# Patient Record
Sex: FEMALE | Race: WHITE | NOT HISPANIC OR LATINO | Employment: OTHER | URBAN - METROPOLITAN AREA
[De-identification: names, ages, dates, MRNs, and addresses within clinical notes are randomized per-mention and may not be internally consistent; named-entity substitution may affect disease eponyms.]

---

## 2017-06-12 ENCOUNTER — APPOINTMENT (OUTPATIENT)
Dept: RADIOLOGY | Facility: CLINIC | Age: 82
End: 2017-06-12
Attending: EMERGENCY MEDICINE
Payer: COMMERCIAL

## 2017-06-12 ENCOUNTER — OFFICE VISIT (OUTPATIENT)
Dept: URGENT CARE | Facility: CLINIC | Age: 82
End: 2017-06-12
Payer: COMMERCIAL

## 2017-06-12 DIAGNOSIS — R05.9 COUGH: ICD-10-CM

## 2017-06-12 PROCEDURE — 71020 HB CHEST X-RAY 2VW FRONTAL&LATL: CPT

## 2017-06-12 PROCEDURE — G0463 HOSPITAL OUTPT CLINIC VISIT: HCPCS

## 2017-06-12 PROCEDURE — 99213 OFFICE O/P EST LOW 20 MIN: CPT

## 2017-06-12 PROCEDURE — 87430 STREP A AG IA: CPT

## 2017-07-17 ENCOUNTER — APPOINTMENT (OUTPATIENT)
Dept: RADIOLOGY | Facility: CLINIC | Age: 82
End: 2017-07-17
Attending: EMERGENCY MEDICINE
Payer: COMMERCIAL

## 2017-07-17 ENCOUNTER — OFFICE VISIT (OUTPATIENT)
Dept: URGENT CARE | Facility: CLINIC | Age: 82
End: 2017-07-17
Payer: COMMERCIAL

## 2017-07-17 DIAGNOSIS — R07.81 PLEURODYNIA: ICD-10-CM

## 2017-07-17 PROCEDURE — 99213 OFFICE O/P EST LOW 20 MIN: CPT

## 2017-07-17 PROCEDURE — 71101 X-RAY EXAM UNILAT RIBS/CHEST: CPT

## 2017-07-17 PROCEDURE — G0463 HOSPITAL OUTPT CLINIC VISIT: HCPCS

## 2017-11-15 ENCOUNTER — OFFICE VISIT (OUTPATIENT)
Dept: URGENT CARE | Facility: CLINIC | Age: 82
End: 2017-11-15
Payer: COMMERCIAL

## 2017-11-15 PROCEDURE — 99213 OFFICE O/P EST LOW 20 MIN: CPT

## 2017-11-16 NOTE — PROGRESS NOTES
Assessment    1  Acute bronchitis (466 0) (J20 9)    Plan  Acute bronchitis    · Azithromycin 250 MG Oral Tablet; TAKE 2 TABLETS ON DAY 1 THEN TAKE 1TABLET A DAY FOR 4 DAYS    Discussion/Summary  Discussion Summary:   Mucinex DM is recommended for cough as needed  Return immediately or go to the emergency department if any worsening chest pain trouble breathing or any other problems as we discussed  Understands and agrees with treatment plan: The treatment plan was reviewed with the patient/guardian  The patient/guardian understands and agrees with the treatment plan      Chief Complaint    1  Cold Symptoms  Chief Complaint Free Text Note Form: Pt reports on Saturday she developed a NP cough and headache  Denies fevers  She has been using her proair inhaler  History of Present Illness  HPI: For last 5 days this patient has had a moist nonproductive cough  No dyspnea  No chest pain  No fever or chills  No sore throat  Patient has chronic rhinorrhea  is alert oriented pleasant and in no distress  She does not appear toxic  Pupils equal react to light sclerae white conjunctiva pink  Nose is slightly congested  Throat is clear and moist without inflammation  Neck is supple without nodes  No jugular venous distention  No orthopnea or paroxysmal nocturnal dyspnea  Lungs are clear bilaterally with good breath sounds  No wheezing rales or rhonchi  Heart is Reg without murmurs or rubs  No S3 or S4  No pedal edema or sign of DVT  Hospital Based Practices Required Assessment:  Pain Assessment  the patient states they do not have pain  Abuse And Domestic Violence Screen   Yes, the patient is safe at home  -- The patient states no one is hurting them  Depression And Suicide Screen  No, the patient has not had thoughts of hurting themself  No, the patient has not felt depressed in the past 7 days  Prefered Language is  [place]  Primary Language is  English  Active Problems  1   Acute bronchitis (466 0) (J20 9)   2  Cough (786 2) (R05)   3  HTN (hypertension) (401 9) (I10)   4  Rib fractures (807 00) (S22 39XA)   5  Rib pain on right side (786 50) (R07 81)    Social History     · Former smoker (Q07 30) (B13 662)  Social History Reviewed: The social history was reviewed and updated today  Current Meds   1  HydroCHLOROthiazide 50 MG Oral Tablet; Therapy: (Recorded:12Jun2017) to Recorded   2  Montelukast Sodium 10 MG Oral Tablet; Therapy: (Recorded:12Jun2017) to Recorded   3  ProAir  (90 Base) MCG/ACT Inhalation Aerosol Solution; Therapy: (Recorded:12Jun2017) to Recorded   4  TraMADol HCl - 50 MG Oral Tablet; TAKE 1 TABLET 4 TIMES DAILY AS NEEDED; Therapy: [id number] to (Evaluate:[id number]); Last Rx:[id number] Ordered  Medication List Reviewed: The medication list was reviewed and updated today  Allergies    1   No Known Drug Allergies    Vitals  Signs   Recorded: 13JPX1219 01:27PM   Temperature: 98 1 F  Heart Rate: 82  Respiration: 20  Systolic: 020  Diastolic: 88  O2 Saturation: 95    Signatures   Electronically signed by : DUY Glass ; Nov 15 2017  1:42PM EST                       (Author)

## 2019-04-11 ENCOUNTER — OFFICE VISIT (OUTPATIENT)
Dept: GASTROENTEROLOGY | Facility: CLINIC | Age: 84
End: 2019-04-11
Payer: COMMERCIAL

## 2019-04-11 VITALS
SYSTOLIC BLOOD PRESSURE: 120 MMHG | BODY MASS INDEX: 25.4 KG/M2 | WEIGHT: 121 LBS | DIASTOLIC BLOOD PRESSURE: 78 MMHG | HEART RATE: 69 BPM | HEIGHT: 58 IN

## 2019-04-11 DIAGNOSIS — K21.9 GASTROESOPHAGEAL REFLUX DISEASE, ESOPHAGITIS PRESENCE NOT SPECIFIED: ICD-10-CM

## 2019-04-11 DIAGNOSIS — Z79.01 CHRONIC ANTICOAGULATION: ICD-10-CM

## 2019-04-11 DIAGNOSIS — R13.10 DYSPHAGIA, UNSPECIFIED TYPE: Primary | ICD-10-CM

## 2019-04-11 DIAGNOSIS — Z83.71 FAMILY HISTORY OF COLONIC POLYPS: ICD-10-CM

## 2019-04-11 PROCEDURE — 99204 OFFICE O/P NEW MOD 45 MIN: CPT | Performed by: NURSE PRACTITIONER

## 2019-04-11 RX ORDER — BUDESONIDE AND FORMOTEROL FUMARATE DIHYDRATE 160; 4.5 UG/1; UG/1
2 AEROSOL RESPIRATORY (INHALATION) 2 TIMES DAILY
Refills: 0 | COMMUNITY
Start: 2019-02-15

## 2019-04-11 RX ORDER — RIVAROXABAN 20 MG/1
TABLET, FILM COATED ORAL DAILY
Refills: 0 | COMMUNITY
Start: 2019-03-21

## 2019-04-11 RX ORDER — ESOMEPRAZOLE MAGNESIUM 40 MG/1
40 CAPSULE, DELAYED RELEASE ORAL DAILY
Qty: 30 CAPSULE | Refills: 2 | Status: SHIPPED | OUTPATIENT
Start: 2019-04-11 | End: 2019-06-14 | Stop reason: SDUPTHER

## 2019-04-11 RX ORDER — HYDROCHLOROTHIAZIDE 50 MG/1
TABLET ORAL DAILY
COMMUNITY

## 2019-04-11 RX ORDER — AMLODIPINE BESYLATE 10 MG/1
TABLET ORAL
Refills: 0 | COMMUNITY
Start: 2019-04-08

## 2019-04-11 RX ORDER — AZITHROMYCIN 250 MG/1
TABLET, FILM COATED ORAL DAILY
Status: ON HOLD | COMMUNITY
Start: 2017-11-15 | End: 2019-05-01 | Stop reason: ALTCHOICE

## 2019-04-11 RX ORDER — ALBUTEROL SULFATE 2.5 MG/3ML
2.5 SOLUTION RESPIRATORY (INHALATION) 4 TIMES DAILY
Refills: 0 | COMMUNITY
Start: 2019-01-10

## 2019-04-11 RX ORDER — ALBUTEROL SULFATE 90 UG/1
AEROSOL, METERED RESPIRATORY (INHALATION) DAILY PRN
COMMUNITY

## 2019-04-11 RX ORDER — MONTELUKAST SODIUM 10 MG/1
TABLET ORAL
COMMUNITY

## 2019-04-15 PROBLEM — K21.9 CHRONIC GERD: Status: ACTIVE | Noted: 2019-04-15

## 2019-04-23 ENCOUNTER — TELEPHONE (OUTPATIENT)
Dept: GASTROENTEROLOGY | Facility: CLINIC | Age: 84
End: 2019-04-23

## 2019-04-26 RX ORDER — SODIUM CHLORIDE 9 MG/ML
20 INJECTION, SOLUTION INTRAVENOUS CONTINUOUS
Status: CANCELLED | OUTPATIENT
Start: 2019-05-01 | End: 2019-05-02

## 2019-05-01 ENCOUNTER — ANESTHESIA (OUTPATIENT)
Dept: PERIOP | Facility: HOSPITAL | Age: 84
End: 2019-05-01
Payer: COMMERCIAL

## 2019-05-01 ENCOUNTER — ANESTHESIA EVENT (OUTPATIENT)
Dept: PERIOP | Facility: HOSPITAL | Age: 84
End: 2019-05-01
Payer: COMMERCIAL

## 2019-05-01 ENCOUNTER — HOSPITAL ENCOUNTER (OUTPATIENT)
Facility: HOSPITAL | Age: 84
Setting detail: OUTPATIENT SURGERY
Discharge: HOME | End: 2019-05-01
Attending: INTERNAL MEDICINE | Admitting: INTERNAL MEDICINE
Payer: COMMERCIAL

## 2019-05-01 VITALS
SYSTOLIC BLOOD PRESSURE: 173 MMHG | HEART RATE: 76 BPM | BODY MASS INDEX: 24.9 KG/M2 | WEIGHT: 118.6 LBS | DIASTOLIC BLOOD PRESSURE: 74 MMHG | OXYGEN SATURATION: 96 % | TEMPERATURE: 97.6 F | HEIGHT: 58 IN | RESPIRATION RATE: 18 BRPM

## 2019-05-01 DIAGNOSIS — K44.9 HIATAL HERNIA: Primary | ICD-10-CM

## 2019-05-01 PROCEDURE — 43235 EGD DIAGNOSTIC BRUSH WASH: CPT | Performed by: INTERNAL MEDICINE

## 2019-05-01 RX ORDER — SODIUM CHLORIDE 9 MG/ML
50 INJECTION, SOLUTION INTRAVENOUS CONTINUOUS
Status: DISCONTINUED | OUTPATIENT
Start: 2019-05-01 | End: 2019-05-01 | Stop reason: HOSPADM

## 2019-05-01 RX ORDER — PROPOFOL 10 MG/ML
INJECTION, EMULSION INTRAVENOUS AS NEEDED
Status: DISCONTINUED | OUTPATIENT
Start: 2019-05-01 | End: 2019-05-01 | Stop reason: SURG

## 2019-05-01 RX ADMIN — PROPOFOL 50 MG: 10 INJECTION, EMULSION INTRAVENOUS at 10:49

## 2019-05-01 RX ADMIN — SODIUM CHLORIDE 50 ML/HR: 0.9 INJECTION, SOLUTION INTRAVENOUS at 09:42

## 2019-05-01 RX ADMIN — PROPOFOL 20 MG: 10 INJECTION, EMULSION INTRAVENOUS at 10:53

## 2019-05-03 ENCOUNTER — HOSPITAL ENCOUNTER (OUTPATIENT)
Dept: RADIOLOGY | Facility: HOSPITAL | Age: 84
Discharge: HOME | End: 2019-05-03
Attending: INTERNAL MEDICINE
Payer: COMMERCIAL

## 2019-05-03 DIAGNOSIS — K44.9 HIATAL HERNIA: ICD-10-CM

## 2019-05-03 PROCEDURE — 74240 X-RAY XM UPR GI TRC 1CNTRST: CPT

## 2019-06-14 ENCOUNTER — OFFICE VISIT (OUTPATIENT)
Dept: GASTROENTEROLOGY | Facility: CLINIC | Age: 84
End: 2019-06-14
Payer: COMMERCIAL

## 2019-06-14 VITALS
HEART RATE: 74 BPM | SYSTOLIC BLOOD PRESSURE: 158 MMHG | BODY MASS INDEX: 24.35 KG/M2 | HEIGHT: 58 IN | DIASTOLIC BLOOD PRESSURE: 64 MMHG | WEIGHT: 116 LBS

## 2019-06-14 DIAGNOSIS — Z83.71 FAMILY HISTORY OF COLONIC POLYPS: ICD-10-CM

## 2019-06-14 DIAGNOSIS — R13.10 DYSPHAGIA, UNSPECIFIED TYPE: Primary | ICD-10-CM

## 2019-06-14 DIAGNOSIS — K21.9 GASTROESOPHAGEAL REFLUX DISEASE WITHOUT ESOPHAGITIS: ICD-10-CM

## 2019-06-14 DIAGNOSIS — Z79.01 CHRONIC ANTICOAGULATION: ICD-10-CM

## 2019-06-14 PROCEDURE — 99214 OFFICE O/P EST MOD 30 MIN: CPT | Performed by: NURSE PRACTITIONER

## 2019-06-14 RX ORDER — ESOMEPRAZOLE MAGNESIUM 40 MG/1
40 CAPSULE, DELAYED RELEASE ORAL DAILY
Qty: 90 CAPSULE | Refills: 4 | Status: SHIPPED | OUTPATIENT
Start: 2019-06-14 | End: 2019-12-18 | Stop reason: SDUPTHER

## 2019-12-18 ENCOUNTER — OFFICE VISIT (OUTPATIENT)
Dept: GASTROENTEROLOGY | Facility: CLINIC | Age: 84
End: 2019-12-18
Payer: COMMERCIAL

## 2019-12-18 VITALS
HEIGHT: 58 IN | WEIGHT: 122 LBS | SYSTOLIC BLOOD PRESSURE: 148 MMHG | BODY MASS INDEX: 25.61 KG/M2 | HEART RATE: 67 BPM | DIASTOLIC BLOOD PRESSURE: 90 MMHG

## 2019-12-18 DIAGNOSIS — R13.10 DYSPHAGIA, UNSPECIFIED TYPE: ICD-10-CM

## 2019-12-18 DIAGNOSIS — K21.9 GASTROESOPHAGEAL REFLUX DISEASE WITHOUT ESOPHAGITIS: ICD-10-CM

## 2019-12-18 DIAGNOSIS — R13.19 ESOPHAGEAL DYSPHAGIA: Primary | ICD-10-CM

## 2019-12-18 DIAGNOSIS — Z83.71 FAMILY HISTORY OF COLONIC POLYPS: ICD-10-CM

## 2019-12-18 PROCEDURE — 99214 OFFICE O/P EST MOD 30 MIN: CPT | Performed by: NURSE PRACTITIONER

## 2019-12-18 RX ORDER — ESOMEPRAZOLE MAGNESIUM 40 MG/1
40 CAPSULE, DELAYED RELEASE ORAL DAILY
Qty: 90 CAPSULE | Refills: 4 | Status: SHIPPED | OUTPATIENT
Start: 2019-12-18 | End: 2020-01-03

## 2019-12-18 NOTE — PROGRESS NOTES
[id number] - Outpatient Follow-up Note  Lulu Pan 80 y o  female MRN: [id number]  Encounter: [id number]    ASSESSMENT AND PLAN:      1  Esophageal dysphagia  2  Gastroesophageal reflux disease without esophagitis  Symptoms completely resolved with daily Nexium 40 mg which we will continue  Recent CBC was normal   Advised to continue smaller more frequent meals and head of bed elevation at night  Would require surgical intervention only if large [place] become symptomatic given patient's respiratory status and advanced age  Advised the patient and her family to call immediately for return of any significant GI symptoms or dysphagia  - esomeprazole (NexIUM) 40 MG capsule; Take 1 capsule (40 mg total) by mouth daily  Dispense: 90 capsule; Refill: 4    3  Family history of colonic polyps    Brother  The patient has never had a screening colonoscopy  No additional risk factors or alarm symptoms to recommend at this time given advanced age and severe pulmonary disease  Followup Appointment:  1 year  ______________________________________________________________________    Chief Complaint   Patient presents with    Follow-up     dysphagia     HPI:  27-year-old female patient returns to the office, accompanied by her son and niece, to follow up her her dysphagia and reflux  Patient reports reflux, heartburn symptoms are quiescent on daily Nexium which she is taking without missing doses  Reports eating small, more frequent meals and elevating the head of her bed  Denies dysphagia, odynophagia, chest pain, early satiety  No change in bowel habits  Denies melena, hematochezia, abdominal or rectal pain  No unintended weight loss   Appetite is normal     Historical Information   Past Medical History:   Diagnosis Date    COPD [facility]chronic obstructive [facility] disease) ([facility] [address] [facility]     Hiatal hernia     Hypertension     Melanoma in situ of the skin ([facility] [address] )     Multiple sites removed  Pneumonia     Pulmonary embolism (HCC)     on Xarelto    Skin cancer      Past Surgical History:   Procedure Laterality Date    APPENDECTOMY      HYSTERECTOMY      IL ESOPHAGOGASTRODUODENOSCOPY TRANSORAL DIAGNOSTIC N/A 2019    Procedure: ESOPHAGOGASTRODUODENOSCOPY (EGD); Surgeon: Tori Toribio MD;  Location:  MAIN OR;  Service: Gastroenterology   Gricelda Carballo SKIN CANCER EXCISION      SKIN CANCER EXCISION      UPPER GASTROINTESTINAL ENDOSCOPY       Social History     Substance and Sexual Activity   Alcohol Use Never    Frequency: Never    Binge frequency: Never     Social History     Substance and Sexual Activity   Drug Use Never     Social History     Tobacco Use   Smoking Status Former Smoker    Types: Cigarettes    Last attempt to quit: 1970    Years since quittin 9   Smokeless Tobacco Never Used     Family History   Problem Relation Age of Onset    Heart disease Mother     Diabetes Father     Diabetes Sister     Cancer Brother     Colon polyps Brother     Diabetes Brother          Current Outpatient Medications:     albuterol (2 5 mg/3 mL) 0 083 % nebulizer solution    albuterol (PROAIR HFA) 90 mcg/act inhaler    amLODIPine (NORVASC) 10 mg tablet    esomeprazole (NexIUM) 40 MG capsule    hydrochlorothiazide (HYDRODIURIL) 50 mg tablet    ipratropium (ATROVENT) 0 02 % nebulizer solution    montelukast (SINGULAIR) 10 mg tablet    SYMBICORT 160-4 5 MCG/ACT inhaler    XARELTO 20 MG tablet  No Known Allergies  Reviewed medications and allergies and updated as indicated    PHYSICAL EXAM:    Blood pressure 148/90, pulse 67, height 4' 10" (1 473 m), weight 55 3 kg (122 lb)  Body mass index is 25 5 kg/m²  General Appearance: NAD, cooperative, alert  Head: Normocephalic, atraumatic  Eyes: Anicteric, PERRLA, EOMI  ENT:   normal external appearance, normal mucosa      Neck:  Supple, symmetrical, trachea midline  Resp:  Clear but decreased to auscultation bilaterally; no rales, rhonchi or wheezing; respirations unlabored   CV:  S1 S2, Regular rate and rhythm; no murmur, rub, or gallop  GI:  Soft, non-tender, non-distended; normal bowel sounds; no masses, no organomegaly   Rectal: Deferred  Musculoskeletal: No cyanosis, clubbing or edema  Normal ROM  Skin:  No jaundice, rashes, or lesions   Heme/Lymph: No palpable cervical lymphadenopathy  Psych: Normal affect, good eye contact  Neuro: No gross deficits, AAOx3    Lab Results:  Reviewed lab results from November and December, 2019  No results found for: WBC, HGB, HCT, MCV, PLT  Lab Results   Component Value Date     03/16/2015    K 4 5 03/16/2015     03/16/2015    CO2 36 (H) 03/16/2015    ANIONGAP 5 03/16/2015    BUN 14 03/16/2015    CREATININE 0 54 (L) 03/16/2015    GLUCOSE 90 03/16/2015    CALCIUM 9 5 03/16/2015    AST 17 03/16/2015    ALT 20 03/16/2015    ALKPHOS 107 03/16/2015    PROT 6 9 03/16/2015    BILITOT 0 3 03/16/2015     No results found for: IRON, TIBC, FERRITIN  No results found for: LIPASE    Radiology Results: Reviewed UGI results from May, 2019 with patient and family

## 2019-12-18 NOTE — PATIENT INSTRUCTIONS
Continue to take the Nexium every morning   continue to eat and stop  When you are full   if you have any swallowing problems, nausea or vomiting, pain or other eating / swallowing issues please notify our office immediately

## 2020-01-03 ENCOUNTER — TELEPHONE (OUTPATIENT)
Dept: GASTROENTEROLOGY | Facility: CLINIC | Age: 85
End: 2020-01-03

## 2020-01-03 DIAGNOSIS — K21.9 GASTROESOPHAGEAL REFLUX DISEASE, ESOPHAGITIS PRESENCE NOT SPECIFIED: Primary | ICD-10-CM

## 2020-01-03 RX ORDER — OMEPRAZOLE 40 MG/1
40 CAPSULE, DELAYED RELEASE ORAL DAILY
Qty: 90 CAPSULE | Refills: 4 | Status: SHIPPED | OUTPATIENT
Start: 2020-01-03 | End: 2020-12-18 | Stop reason: SDUPTHER

## 2020-01-03 NOTE — TELEPHONE ENCOUNTER
Pt's son states RA/Pbg called today and said Rx for his Mom is $80  Son wants to know what the med is for and if she needs it because it is $91  Conf'd call from RA was today  Son did not know the name of the med

## 2020-01-03 NOTE — TELEPHONE ENCOUNTER
I spoke with the pharmacy  The nexium when run through in December was $91 00  She ran again today since it is a new year and it was $172 for 90 pills  Pharmacy ran claim through for omeprazole 40 mg qty 90 which was $55 00  Please advise

## 2020-01-04 NOTE — TELEPHONE ENCOUNTER
I sent an E script for omeprazole 40 mg daily as 90 day with 4 refills  I discontinued the esomeprazole since it is cost prohibitive  Can you please let them know?     Thanks HPR

## 2020-12-18 ENCOUNTER — OFFICE VISIT (OUTPATIENT)
Dept: GASTROENTEROLOGY | Facility: CLINIC | Age: 85
End: 2020-12-18
Payer: COMMERCIAL

## 2020-12-18 VITALS — BODY MASS INDEX: 26.33 KG/M2 | DIASTOLIC BLOOD PRESSURE: 82 MMHG | WEIGHT: 126 LBS | SYSTOLIC BLOOD PRESSURE: 138 MMHG

## 2020-12-18 DIAGNOSIS — R13.10 DYSPHAGIA, UNSPECIFIED TYPE: Primary | ICD-10-CM

## 2020-12-18 DIAGNOSIS — K44.9 LARGE HIATAL HERNIA: ICD-10-CM

## 2020-12-18 DIAGNOSIS — Z83.71 FAMILY HISTORY OF COLONIC POLYPS: ICD-10-CM

## 2020-12-18 DIAGNOSIS — K21.9 GASTROESOPHAGEAL REFLUX DISEASE: ICD-10-CM

## 2020-12-18 PROCEDURE — 99213 OFFICE O/P EST LOW 20 MIN: CPT | Performed by: NURSE PRACTITIONER

## 2020-12-18 RX ORDER — OMEPRAZOLE 40 MG/1
40 CAPSULE, DELAYED RELEASE ORAL DAILY
Qty: 90 CAPSULE | Refills: 3 | Status: SHIPPED | OUTPATIENT
Start: 2020-12-18

## 2023-02-24 ENCOUNTER — APPOINTMENT (EMERGENCY)
Dept: RADIOLOGY | Facility: HOSPITAL | Age: 88
End: 2023-02-24

## 2023-02-24 ENCOUNTER — HOSPITAL ENCOUNTER (INPATIENT)
Facility: HOSPITAL | Age: 88
LOS: 3 days | Discharge: HOME | End: 2023-02-28
Attending: EMERGENCY MEDICINE | Admitting: INTERNAL MEDICINE

## 2023-02-24 ENCOUNTER — APPOINTMENT (OUTPATIENT)
Dept: NON INVASIVE DIAGNOSTICS | Facility: HOSPITAL | Age: 88
End: 2023-02-24

## 2023-02-24 ENCOUNTER — APPOINTMENT (EMERGENCY)
Dept: CT IMAGING | Facility: HOSPITAL | Age: 88
End: 2023-02-24

## 2023-02-24 DIAGNOSIS — R93.1 ABNORMAL ECHOCARDIOGRAM: ICD-10-CM

## 2023-02-24 DIAGNOSIS — K21.9 GASTROESOPHAGEAL REFLUX DISEASE WITHOUT ESOPHAGITIS: ICD-10-CM

## 2023-02-24 DIAGNOSIS — E86.0 DEHYDRATION: ICD-10-CM

## 2023-02-24 DIAGNOSIS — D64.9 ANEMIA: ICD-10-CM

## 2023-02-24 DIAGNOSIS — K62.5 BRBPR (BRIGHT RED BLOOD PER RECTUM): ICD-10-CM

## 2023-02-24 DIAGNOSIS — J44.9 COPD (CHRONIC OBSTRUCTIVE PULMONARY DISEASE) (HCC): ICD-10-CM

## 2023-02-24 DIAGNOSIS — R55 SYNCOPE: Primary | ICD-10-CM

## 2023-02-24 DIAGNOSIS — Z79.01 CHRONIC ANTICOAGULATION: ICD-10-CM

## 2023-02-24 DIAGNOSIS — D47.3 ESSENTIAL THROMBOCYTHEMIA (HCC): ICD-10-CM

## 2023-02-24 PROBLEM — D72.829 LEUKOCYTOSIS: Status: ACTIVE | Noted: 2023-02-24

## 2023-02-24 PROBLEM — J44.1 CHRONIC OBSTRUCTIVE PULMONARY DISEASE WITH ACUTE EXACERBATION (HCC): Status: RESOLVED | Noted: 2023-01-22 | Resolved: 2023-02-24

## 2023-02-24 PROBLEM — J44.1 CHRONIC OBSTRUCTIVE PULMONARY DISEASE WITH ACUTE EXACERBATION (HCC): Status: ACTIVE | Noted: 2023-01-22

## 2023-02-24 PROBLEM — I10 HYPERTENSION: Status: ACTIVE | Noted: 2017-06-12

## 2023-02-24 PROBLEM — I26.99 PULMONARY EMBOLI (HCC): Status: ACTIVE | Noted: 2023-02-24

## 2023-02-24 PROBLEM — C92.10 CML (CHRONIC MYELOCYTIC LEUKEMIA) (HCC): Status: ACTIVE | Noted: 2023-02-24

## 2023-02-24 LAB
2HR DELTA HS TROPONIN: 0 NG/L
4HR DELTA HS TROPONIN: 0 NG/L
ABO GROUP BLD: NORMAL
ALBUMIN SERPL BCP-MCNC: 3.2 G/DL (ref 3.5–5)
ALP SERPL-CCNC: 76 U/L (ref 34–104)
ALT SERPL W P-5'-P-CCNC: 9 U/L (ref 7–52)
ANION GAP SERPL CALCULATED.3IONS-SCNC: 8 MMOL/L (ref 4–13)
AORTIC ROOT: 3.3 CM
AORTIC VALVE MEAN VELOCITY: 11.4 M/S
APICAL FOUR CHAMBER EJECTION FRACTION: 75 %
ASCENDING AORTA: 3.3 CM
AST SERPL W P-5'-P-CCNC: 13 U/L (ref 13–39)
AV AREA BY CONTINUOUS VTI: 1.9 CM2
AV AREA PEAK VELOCITY: 1.6 CM2
AV LVOT MEAN GRADIENT: 3 MMHG
AV LVOT PEAK GRADIENT: 4 MMHG
AV MEAN GRADIENT: 6 MMHG
AV PEAK GRADIENT: 12 MMHG
BACTERIA UR QL AUTO: ABNORMAL /HPF
BASOPHILS # BLD AUTO: 0.04 THOUSANDS/ÂΜL (ref 0–0.1)
BASOPHILS NFR BLD AUTO: 0 % (ref 0–1)
BILIRUB SERPL-MCNC: 0.34 MG/DL (ref 0.2–1)
BILIRUB UR QL STRIP: NEGATIVE
BLD GP AB SCN SERPL QL: NEGATIVE
BNP SERPL-MCNC: 101 PG/ML (ref 0–100)
BUN SERPL-MCNC: 29 MG/DL (ref 5–25)
CALCIUM ALBUM COR SERPL-MCNC: 8.4 MG/DL (ref 8.3–10.1)
CALCIUM SERPL-MCNC: 7.8 MG/DL (ref 8.4–10.2)
CARDIAC TROPONIN I PNL SERPL HS: 6 NG/L
CHLORIDE SERPL-SCNC: 104 MMOL/L (ref 96–108)
CLARITY UR: CLEAR
CO2 SERPL-SCNC: 27 MMOL/L (ref 21–32)
COLOR UR: YELLOW
CREAT SERPL-MCNC: 0.73 MG/DL (ref 0.6–1.3)
DOP CALC AO PEAK VEL: 1.77 M/S
DOP CALC AO VTI: 33.77 CM
DOP CALC LVOT DIAMETER: 1.9 CM
DOP CALC LVOT PEAK VEL VTI: 22.33 CM
DOP CALC LVOT PEAK VEL: 1.01 M/S
DOP CALC LVOT STROKE INDEX: 41.3 ML/M2
DOP CALC LVOT STROKE VOLUME: 62
DOP CALC MV VTI: 34.48 CM
E WAVE DECELERATION TIME: 180 MS
E/A RATIO: 0.59
EOSINOPHIL # BLD AUTO: 0.03 THOUSAND/ÂΜL (ref 0–0.61)
EOSINOPHIL NFR BLD AUTO: 0 % (ref 0–6)
ERYTHROCYTE [DISTWIDTH] IN BLOOD BY AUTOMATED COUNT: 13.6 % (ref 11.6–15.1)
FLUAV RNA RESP QL NAA+PROBE: NEGATIVE
FLUBV RNA RESP QL NAA+PROBE: NEGATIVE
FRACTIONAL SHORTENING: 38 (ref 28–44)
GFR SERPL CREATININE-BSD FRML MDRD: 69 ML/MIN/1.73SQ M
GLUCOSE SERPL-MCNC: 165 MG/DL (ref 65–140)
GLUCOSE UR STRIP-MCNC: NEGATIVE MG/DL
HCT VFR BLD AUTO: 26.2 % (ref 34.8–46.1)
HGB BLD-MCNC: 8.4 G/DL (ref 11.5–15.4)
HGB UR QL STRIP.AUTO: ABNORMAL
HYALINE CASTS #/AREA URNS LPF: ABNORMAL /LPF
IMM GRANULOCYTES # BLD AUTO: 0.17 THOUSAND/UL (ref 0–0.2)
IMM GRANULOCYTES NFR BLD AUTO: 1 % (ref 0–2)
INTERVENTRICULAR SEPTUM IN DIASTOLE (PARASTERNAL SHORT AXIS VIEW): 0.9 CM
INTERVENTRICULAR SEPTUM: 0.9 CM (ref 0.6–1.1)
KETONES UR STRIP-MCNC: NEGATIVE MG/DL
LAAS-AP4: 18.2 CM2
LEFT ATRIUM SIZE: 2.4 CM
LEFT INTERNAL DIMENSION IN SYSTOLE: 2.4 CM (ref 2.1–4)
LEFT VENTRICLE DIASTOLIC VOLUME (MOD BIPLANE): 66 ML
LEFT VENTRICLE SYSTOLIC VOLUME (MOD BIPLANE): 16 ML
LEFT VENTRICULAR INTERNAL DIMENSION IN DIASTOLE: 3.9 CM (ref 3.5–6)
LEFT VENTRICULAR POSTERIOR WALL IN END DIASTOLE: 0.9 CM
LEFT VENTRICULAR STROKE VOLUME: 45 ML
LEUKOCYTE ESTERASE UR QL STRIP: NEGATIVE
LV EF: 76 %
LVSV (TEICH): 45 ML
LYMPHOCYTES # BLD AUTO: 1.52 THOUSANDS/ÂΜL (ref 0.6–4.47)
LYMPHOCYTES NFR BLD AUTO: 12 % (ref 14–44)
MCH RBC QN AUTO: 37.8 PG (ref 26.8–34.3)
MCHC RBC AUTO-ENTMCNC: 32.1 G/DL (ref 31.4–37.4)
MCV RBC AUTO: 118 FL (ref 82–98)
MONOCYTES # BLD AUTO: 0.79 THOUSAND/ÂΜL (ref 0.17–1.22)
MONOCYTES NFR BLD AUTO: 6 % (ref 4–12)
MUCOUS THREADS UR QL AUTO: ABNORMAL
MV E'TISSUE VEL-LAT: 5 CM/S
MV E'TISSUE VEL-SEP: 4 CM/S
MV MEAN GRADIENT: 4 MMHG
MV PEAK A VEL: 1.65 M/S
MV PEAK E VEL: 98 CM/S
MV PEAK GRADIENT: 13 MMHG
MV STENOSIS PRESSURE HALF TIME: 52 MS
MV VALVE AREA P 1/2 METHOD: 4.2
NEUTROPHILS # BLD AUTO: 10.36 THOUSANDS/ÂΜL (ref 1.85–7.62)
NEUTS SEG NFR BLD AUTO: 81 % (ref 43–75)
NITRITE UR QL STRIP: NEGATIVE
NON-SQ EPI CELLS URNS QL MICRO: ABNORMAL /HPF
NRBC BLD AUTO-RTO: 0 /100 WBCS
PH UR STRIP.AUTO: 5.5 [PH]
PLATELET # BLD AUTO: 318 THOUSANDS/UL (ref 149–390)
PMV BLD AUTO: 9.5 FL (ref 8.9–12.7)
POTASSIUM SERPL-SCNC: 4 MMOL/L (ref 3.5–5.3)
PROT SERPL-MCNC: 5.4 G/DL (ref 6.4–8.4)
PROT UR STRIP-MCNC: NEGATIVE MG/DL
RBC # BLD AUTO: 2.22 MILLION/UL (ref 3.81–5.12)
RBC #/AREA URNS AUTO: ABNORMAL /HPF
RH BLD: POSITIVE
RIGHT ATRIAL 2D VOLUME: 31 ML
RIGHT ATRIUM AREA SYSTOLE A4C: 12.9 CM2
RIGHT VENTRICLE ID DIMENSION: 3.5 CM
RSV RNA RESP QL NAA+PROBE: NEGATIVE
SARS-COV-2 RNA RESP QL NAA+PROBE: NEGATIVE
SL CV PED ECHO LEFT VENTRICLE DIASTOLIC VOLUME (MOD BIPLANE) 2D: 65 ML
SL CV PED ECHO LEFT VENTRICLE SYSTOLIC VOLUME (MOD BIPLANE) 2D: 20 ML
SODIUM SERPL-SCNC: 139 MMOL/L (ref 135–147)
SP GR UR STRIP.AUTO: >=1.03 (ref 1–1.03)
SPECIMEN EXPIRATION DATE: NORMAL
TR MAX PG: 21 MMHG
TR PEAK VELOCITY: 2.3 M/S
TRICUSPID ANNULAR PLANE SYSTOLIC EXCURSION: 1.7 CM
TRICUSPID VALVE PEAK REGURGITATION VELOCITY: 2.26 M/S
UROBILINOGEN UR STRIP-ACNC: <2 MG/DL
WBC # BLD AUTO: 12.91 THOUSAND/UL (ref 4.31–10.16)
WBC #/AREA URNS AUTO: ABNORMAL /HPF

## 2023-02-24 RX ORDER — FLUTICASONE FUROATE AND VILANTEROL 200; 25 UG/1; UG/1
1 POWDER RESPIRATORY (INHALATION) DAILY
Status: DISCONTINUED | OUTPATIENT
Start: 2023-02-25 | End: 2023-02-28 | Stop reason: HOSPADM

## 2023-02-24 RX ORDER — BUDESONIDE 0.5 MG/2ML
0.5 INHALANT ORAL 2 TIMES DAILY
Status: DISCONTINUED | OUTPATIENT
Start: 2023-02-24 | End: 2023-02-24

## 2023-02-24 RX ORDER — POTASSIUM CHLORIDE 750 MG/1
10 TABLET, EXTENDED RELEASE ORAL 2 TIMES DAILY
Status: DISCONTINUED | OUTPATIENT
Start: 2023-02-24 | End: 2023-02-28 | Stop reason: HOSPADM

## 2023-02-24 RX ORDER — SODIUM CHLORIDE 9 MG/ML
50 INJECTION, SOLUTION INTRAVENOUS CONTINUOUS
Status: DISCONTINUED | OUTPATIENT
Start: 2023-02-24 | End: 2023-02-26

## 2023-02-24 RX ORDER — GUAIFENESIN 600 MG/1
600 TABLET, EXTENDED RELEASE ORAL EVERY 12 HOURS SCHEDULED
Status: ON HOLD | COMMUNITY

## 2023-02-24 RX ORDER — ALBUTEROL SULFATE 2.5 MG/3ML
2.5 SOLUTION RESPIRATORY (INHALATION) 4 TIMES DAILY
Status: DISCONTINUED | OUTPATIENT
Start: 2023-02-24 | End: 2023-02-24

## 2023-02-24 RX ORDER — IPRATROPIUM BROMIDE AND ALBUTEROL SULFATE 2.5; .5 MG/3ML; MG/3ML
3 SOLUTION RESPIRATORY (INHALATION) EVERY 6 HOURS PRN
Status: DISCONTINUED | OUTPATIENT
Start: 2023-02-24 | End: 2023-02-28 | Stop reason: HOSPADM

## 2023-02-24 RX ORDER — AMLODIPINE BESYLATE 5 MG/1
5 TABLET ORAL DAILY
Status: DISCONTINUED | OUTPATIENT
Start: 2023-02-24 | End: 2023-02-28 | Stop reason: HOSPADM

## 2023-02-24 RX ORDER — PANTOPRAZOLE SODIUM 40 MG/1
40 TABLET, DELAYED RELEASE ORAL
Status: DISCONTINUED | OUTPATIENT
Start: 2023-02-24 | End: 2023-02-28 | Stop reason: HOSPADM

## 2023-02-24 RX ORDER — BUDESONIDE 0.5 MG/2ML
0.5 INHALANT ORAL 2 TIMES DAILY
Status: ON HOLD | COMMUNITY

## 2023-02-24 RX ORDER — MONTELUKAST SODIUM 10 MG/1
10 TABLET ORAL
Status: DISCONTINUED | OUTPATIENT
Start: 2023-02-24 | End: 2023-02-28 | Stop reason: HOSPADM

## 2023-02-24 RX ORDER — HYDROXYUREA 500 MG/1
CAPSULE ORAL DAILY
Status: ON HOLD | COMMUNITY

## 2023-02-24 RX ORDER — BUDESONIDE 0.5 MG/2ML
0.5 INHALANT ORAL
Status: DISCONTINUED | OUTPATIENT
Start: 2023-02-24 | End: 2023-02-28 | Stop reason: HOSPADM

## 2023-02-24 RX ORDER — PANTOPRAZOLE SODIUM 40 MG/1
40 TABLET, DELAYED RELEASE ORAL DAILY
Status: ON HOLD | COMMUNITY
End: 2023-03-06 | Stop reason: SDUPTHER

## 2023-02-24 RX ORDER — POTASSIUM CHLORIDE 750 MG/1
10 CAPSULE, EXTENDED RELEASE ORAL 2 TIMES DAILY
Status: ON HOLD | COMMUNITY

## 2023-02-24 RX ORDER — HYDROXYUREA 500 MG/1
500 CAPSULE ORAL DAILY
Status: DISCONTINUED | OUTPATIENT
Start: 2023-02-25 | End: 2023-02-28 | Stop reason: HOSPADM

## 2023-02-24 RX ADMIN — IPRATROPIUM BROMIDE AND ALBUTEROL SULFATE 3 ML: 2.5; .5 SOLUTION RESPIRATORY (INHALATION) at 15:41

## 2023-02-24 RX ADMIN — MONTELUKAST 10 MG: 10 TABLET, FILM COATED ORAL at 21:50

## 2023-02-24 RX ADMIN — PANTOPRAZOLE SODIUM 40 MG: 40 TABLET, DELAYED RELEASE ORAL at 15:41

## 2023-02-24 RX ADMIN — BUDESONIDE 0.5 MG: 0.5 INHALANT ORAL at 20:44

## 2023-02-24 RX ADMIN — RIVAROXABAN 20 MG: 10 TABLET, FILM COATED ORAL at 15:41

## 2023-02-24 RX ADMIN — POTASSIUM CHLORIDE 10 MEQ: 750 TABLET, EXTENDED RELEASE ORAL at 18:52

## 2023-02-24 RX ADMIN — SODIUM CHLORIDE 125 ML/HR: 0.9 INJECTION, SOLUTION INTRAVENOUS at 14:20

## 2023-02-24 RX ADMIN — AMLODIPINE BESYLATE 5 MG: 5 TABLET ORAL at 15:41

## 2023-02-24 NOTE — ASSESSMENT & PLAN NOTE
· Suspect vasovagal as this occurred while patient was having a BM on the toilet  · She had a similar episode last year  · Trauma workup in ED negative  · PT/OT  · Echo pending  · Denies history of CHF  · Monitor on telemetry for 24 hours  · Continue IVF  · Check orthostatics

## 2023-02-24 NOTE — H&P
[place]  H&P- Katie Friedfelter 3/22/1926, 80 y o  female MRN: [id number]  Unit/Bed#: ED 05 Encounter: [id number]  Primary Care Provider: SLIM Jones   Date and time admitted to hospital: 2/24/2023 12:40 PM    * Syncope  Assessment & Plan  · Suspect vasovagal as this occurred while patient was having a BM on the toilet  · She had a similar episode last year  · Trauma workup in ED negative  · PT/OT  · Echo pending  · Denies history of CHF  · Monitor on telemetry for 24 hours  · Continue IVF  · Check orthostatics    CML (chronic myelocytic leukemia) ([facility] )  Assessment & Plan  · 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 and takes a small white pill every day for it" he claims this was discovered during and admission for pneumonia last year when WBCs were 27K  · Discovered patient is on hydroxyurea, continue  · Follows with Dr Eli Russo in [place]  · Declined bone marrow biopsy due to patient age  · Continue outpatient follow up  · WBC 12 9 on admission  · UA pending    Chronic anticoagulation  Assessment & Plan  · History of PE and now CML  · Maintained on Xarelto 20mg daily by outpatient provider   · Sign of bleed during trauma eval in ED  · Continue    Hypertension  Assessment & Plan  · Maintained on HCTZ and Norvasc outpatient  · Resume Norvasc at 5mg  · Hold HCTZ- will likely discontinue  · Pressures stable    COPD (chronic obstructive pulmonary disease) ([facility] )  Assessment & Plan  · Not in acute exacerbation    Large hiatal hernia  Assessment & Plan  · Continue PPI    VTE Pharmacologic Prophylaxis: VTE Score: 9 High Risk (Score >/= 5) - Pharmacological DVT Prophylaxis Ordered: rivaroxaban (Xarelto)  Sequential Compression Devices Ordered  Code Status: Level 3 - DNAR and DNI   Discussion with family: Updated  (son) at bedside      Anticipated Length of Stay: Patient will be admitted on an observation basis with an anticipated length of stay of less than 2 midnights secondary to vasovagal syncope  Total Time Spent on Date of Encounter in care of patient: 65 minutes This time was spent on one or more of the following: performing physical exam; counseling and coordination of care; obtaining or reviewing history; documenting in the medical record; reviewing/ordering tests, medications or procedures; communicating with other healthcare professionals and discussing with patient's family/caregivers  Chief Complaint: syncope while having BM    History of Present Illness:  Aries Angel is a 80 y o  female with a PMH of COPD, hypertension, CML, prior PE on lifelong Xarelto who presents with syncopal episode  Reports that she was having a bowel movement when she lost consciousness and landed on the ground  She could not get up, called her son whom she lives with to assist her  She then came to the ED after they discussed with their outpatient provider  Patient was in her usual state of health prior to this  She denies nausea, vomiting, diarrhea  She did have some lightheadedness and dizziness associated with the event but that has completely resolved in the ED after receiving IV fluids  Reviewing patient history they reports she has "a blood cancer" was diagnosed last year and she takes " a small white pill for this" her review of medication list with patient's family and the fact that it was discovered while she had elevated white blood cells during the hospitalization, suspect she has a CML diagnosis  Family deferred bone marrow biopsy at that time which she continues to follow with Dr Luz Elena [facility]on [facility]  And already responding to IV fluids at time of admission  Reports that symptoms have completely resolved  Admit as a syncopal work-up with echo, PT, OT, telemetry and orthostatic blood pressures   Hold HCTZ and likely discontinue after his pressures are stable and patient did not take this medication today, this may contribute to slightly dehydrated picture that prompted admission  Cr normal at time of admission  Review of Systems:  Review of Systems    Past Medical and Surgical History:   Past Medical History:   Diagnosis Date   • COPD (chronic obstructive pulmonary disease) ([facility] [address] )    • Hiatal hernia    • Hypertension    • Melanoma in situ of the skin [facility])     Multiple sites removed   • Pneumonia    • Pulmonary embolism ([facility] [address] )     on Xarelto   • Skin cancer        Past Surgical History:   Procedure Laterality Date   • APPENDECTOMY     • HYSTERECTOMY     • [place] ESOPHAGOGASTRODUODENOSCOPY TRANSORAL DIAGNOSTIC N/A 5/1/2019    Procedure: ESOPHAGOGASTRODUODENOSCOPY (EGD); Surgeon: Germain Locke MD;  Location:  MAIN OR;  Service: Gastroenterology   • SKIN CANCER EXCISION     • SKIN CANCER EXCISION     • UPPER GASTROINTESTINAL ENDOSCOPY         Meds/Allergies:  Prior to Admission medications    Medication Sig Start Date End Date Taking? Authorizing Provider   albuterol (2 5 mg/3 mL) 0 083 % nebulizer solution Take 2 5 mg by nebulization 4 (four) times a day 1/10/19   Historical Provider, MD   albuterol (PROAIR HFA) 90 mcg/act inhaler Inhale daily as needed     Historical Provider, MD   amLODIPine (NORVASC) 10 mg tablet  4/8/19   Historical Provider, MD   hydrochlorothiazide (HYDRODIURIL) 50 mg tablet Take by mouth daily     Historical Provider, MD   ipratropium (ATROVENT) 0 02 % nebulizer solution 4 (four) times a day  4/8/19   Historical Provider, MD   montelukast (SINGULAIR) 10 mg tablet Take by mouth daily at bedtime     Historical Provider, MD   omeprazole (PriLOSEC) 40 MG capsule Take 1 capsule (40 mg total) by mouth daily 12/18/20   SLIM Johnson   SYMBICORT 160-4 5 MCG/ACT inhaler 2 puffs 2 (two) times a day  2/15/19   Historical Provider, MD Alberto Actis 20 MG tablet daily  3/21/19   Historical Provider, MD PHAN have reviewed home medications with patient family member      Allergies: No Known Allergies    Social History:  Marital Status:    Occupation:   Patient Pre-hospital Living Situation: Home  Patient Pre-hospital Level of Mobility: walks with cane  Patient Pre-hospital Diet Restrictions: none  Substance Use History:   Social History     Substance and Sexual Activity   Alcohol Use Never     Social History     Tobacco Use   Smoking Status Former   • Types: Cigarettes   • Quit date:    • Years since quittin 1   Smokeless Tobacco Never     Social History     Substance and Sexual Activity   Drug Use Never       Family History:  Family History   Problem Relation Age of Onset   • Heart disease Mother    • Diabetes Father    • Diabetes Sister    • Cancer Brother    • Colon polyps Brother    • Diabetes Brother        Physical Exam:     Vitals:   Blood Pressure: 138/61 (23 1430)  Pulse: 70 (23 1430)  Temperature: 98 7 °F (37 1 °C) (23 1246)  Temp Source: Temporal (23 1246)  Respirations: 16 (23 1430)  SpO2: 99 % (23 1430)    Physical Exam  Vitals and nursing note reviewed  Constitutional:       General: She is not in acute distress  Appearance: Normal appearance  She is well-developed  HENT:      Head: Normocephalic and atraumatic  Eyes:      General: No scleral icterus  Conjunctiva/sclera: Conjunctivae normal    Cardiovascular:      Rate and Rhythm: Normal rate and regular rhythm  Heart sounds: No murmur heard  Pulmonary:      Effort: Pulmonary effort is normal       Breath sounds: No wheezing, rhonchi or rales  Abdominal:      General: There is no distension  Palpations: Abdomen is soft  Skin:     General: Skin is warm and dry  Neurological:      General: No focal deficit present  Mental Status: She is alert     Psychiatric:         Mood and Affect: Mood normal           Additional Data:     Lab Results:  Results from last 7 days   Lab Units 23  1253   WBC Thousand/uL 12 91*   HEMOGLOBIN g/dL 8 4*   HEMATOCRIT % 26 2*   PLATELETS Thousands/uL 318 NEUTROS PCT % 81*   LYMPHS PCT % 12*   MONOS PCT % 6   EOS PCT % 0     Results from last 7 days   Lab Units 02/24/23  1253   SODIUM mmol/L 139   POTASSIUM mmol/L 4 0   CHLORIDE mmol/L 104   CO2 mmol/L 27   BUN mg/dL 29*   CREATININE mg/dL 0 73   ANION GAP mmol/L 8   CALCIUM mg/dL 7 8*   ALBUMIN g/dL 3 2*   TOTAL BILIRUBIN mg/dL 0 34   ALK PHOS U/L 76   ALT U/L 9   AST U/L 13   GLUCOSE RANDOM mg/dL 165*                       Lines/Drains:  Invasive Devices     Peripheral Intravenous Line  Duration           Peripheral IV Right Antecubital -- days                    Imaging: Reviewed radiology reports from this admission including: chest xray, CT head and xray(s)  TRAUMA - CT head wo contrast   Final Result by Warden Mita MD (02/24 1319)      No acute intracranial abnormality  Workstation performed: [id number]         XR Trauma chest portable   Final Result by Warden Mita MD (02/24 1320)      No radiographic findings of acute thoracic injury  Large hiatal hernia  Workstation performed: [id number]         XR Trauma pelvis ap only 1 or 2 vw   Final Result by Warden Mita MD (02/24 1322)      No acute osseous abnormality  Workstation performed: [id number]             EKG and Other Studies Reviewed on Admission:   · EKG: NSR  HR 73bpm     ** Please Note: This note has been constructed using a voice recognition system   **

## 2023-02-24 NOTE — ASSESSMENT & PLAN NOTE
· Maintained on HCTZ and Norvasc outpatient  · Resume Norvasc at 5mg  · Hold HCTZ- will likely discontinue  · Pressures stable

## 2023-02-24 NOTE — ASSESSMENT & PLAN NOTE
· 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 and takes a small white pill every day for it" he claims this was discovered during and admission for pneumonia last year when WBCs were 27K  · Discovered patient is on hydroxyurea, continue  · Follows with Dr Steffi Yanez in [place]  · Declined bone marrow biopsy due to patient age  · Continue outpatient follow up  · WBC 12 9 on admission  · UA pending

## 2023-02-24 NOTE — ED NOTES
Bright red blood noted in diaper  Patient states it is coming from her butt and started when she fell  Doctor aware       Noemí Guzman RN  02/24/23 [id number]

## 2023-02-24 NOTE — ED PROVIDER NOTES
Emergency Department Trauma Note  Temo Hong 80 y o  female MRN: [id number]  Unit/Bed#: ED 05/ED 05 Encounter: [id number]      Trauma Alert: Trauma Acuity: Trauma Evaluation  Model of Arrival: Mode of Arrival: Direct from scene via    Trauma Team: Current Providers  Attending Provider: Ga Jasmine DO  Consultants:     None      History of Present Illness     Chief Complaint:   Chief Complaint   Patient presents with   • Fall     Pt to er after falling this am after using the restroom and passing out after a bowel movement     HPI:  Temo Hong is a 80 y o  female who [facility]  Mechanism:Details of Incident: pt to er with son after experieicing dizziness and fall in the bathroom this am after having bowel movement  does not know if she hit her head  on thinners  loc Injury Date: 02/24/23 Injury Time: 0730      This is a 80-year-old female who presents from home for evaluation of a syncopal episode  She does have a prior history of COPD melanoma and pulmonary embolism  Currently oriented x3 with a [place] Coma Scale of 15  Trauma evaluation called  Has been having recent diarrhea      History provided by:  Patient and relative  Medical Problem  Location:  Generalized  Quality:  Syncope  Severity:  Unable to specify  Onset quality:  Sudden  Progression:  Resolved  Chronicity:  New  Context:  Syncopal episode in the bathroom  Associated symptoms: diarrhea    Associated symptoms: no chest pain and no shortness of breath      Review of Systems   Respiratory: Negative for shortness of breath  Cardiovascular: Negative for chest pain  Gastrointestinal: Positive for diarrhea  Neurological: Positive for syncope and weakness  All other systems reviewed and are negative  Historical Information     Immunizations: There is no immunization history on file for this patient      Past Medical History:   Diagnosis Date   • COPD (chronic obstructive pulmonary disease) (HCC)    • Hiatal hernia    • Hypertension    • Melanoma in situ of the skin [facility])     Multiple sites removed   • Pneumonia    • Pulmonary embolism (HCC)     on Xarelto   • Skin cancer        Family History   Problem Relation Age of Onset   • Heart disease Mother    • Diabetes Father    • Diabetes Sister    • Cancer Brother    • Colon polyps Brother    • Diabetes Brother      Past Surgical History:   Procedure Laterality Date   • APPENDECTOMY     • HYSTERECTOMY     • [place] ESOPHAGOGASTRODUODENOSCOPY TRANSORAL DIAGNOSTIC N/A 2019    Procedure: ESOPHAGOGASTRODUODENOSCOPY (EGD); Surgeon: Peter Washington MD;  Location:  MAIN OR;  Service: Gastroenterology   • SKIN CANCER EXCISION     • SKIN CANCER EXCISION     • UPPER GASTROINTESTINAL ENDOSCOPY       Social History     Tobacco Use   • Smoking status: Former     Types: Cigarettes     Quit date: 1970     Years since quittin 1   • Smokeless tobacco: Never   Vaping Use   • Vaping Use: Never used   Substance Use Topics   • Alcohol use: Never   • Drug use: Never     E-Cigarette/Vaping   • E-Cigarette Use Never User      E-Cigarette/Vaping Substances   • Nicotine No    • THC No    • CBD No    • Flavoring No    • Other No    • Unknown No        Family History: non-contributory    Meds/Allergies   Prior to Admission Medications   Prescriptions Last Dose Informant Patient Reported? Taking?    SYMBICORT 160-4 5 MCG/ACT inhaler   Yes No   Si puffs 2 (two) times a day    XARELTO 20 MG tablet   Yes No   Sig: daily    albuterol (2 5 mg/3 mL) 0 083 % nebulizer solution   Yes No   Sig: Take 2 5 mg by nebulization 4 (four) times a day   albuterol (PROAIR HFA) 90 mcg/act inhaler   Yes No   Sig: Inhale daily as needed    amLODIPine (NORVASC) 10 mg tablet   Yes No   hydrochlorothiazide (HYDRODIURIL) 50 mg tablet   Yes No   Sig: Take by mouth daily    ipratropium (ATROVENT) 0 02 % nebulizer solution   Yes No   Si (four) times a day montelukast (SINGULAIR) 10 mg tablet   Yes No   Sig: Take by mouth daily at bedtime    omeprazole (PriLOSEC) 40 MG capsule   No No   Sig: Take 1 capsule (40 mg total) by mouth daily      Facility-Administered Medications: None       No Known Allergies    PHYSICAL EXAM    PE limited by: Nothing    Objective   Vitals:   First set: Temperature: 98 7 °F (37 1 °C) (23 1246)  Pulse: 76 (23 1248)  Respirations: 16 (23 1246)  Blood Pressure: 152/63 (23 1246)  SpO2: 96 % (23 1246)    Primary Survey:   (A) Airway: Patent  (B) Breathing: Clear bilateral  (C) Circulation: Pulses:   normal  (D) Disabliity:  GCS Total:  15  (E) Expose:  Completed    Secondary Survey: (Click on Physical Exam tab above)  Physical Exam  Vitals and nursing note reviewed  Constitutional:       General: She is not in acute distress  Appearance: She is not toxic-appearing or diaphoretic  HENT:      Head: Normocephalic  Right Ear: Tympanic membrane, ear canal and external ear normal       Left Ear: Tympanic membrane, ear canal and external ear normal       Nose: Nose normal    Eyes:      General: No scleral icterus  Right eye: No discharge  Left eye: No discharge  Extraocular Movements: Extraocular movements intact  Pupils: Pupils are equal, round, and reactive to light  Comments: Pupils 4 mm bilateral   Neck:      Comments: The patient has none of the followin  Focal neurologic deficit  2  Midline spinal tenderness  3  AMS  4  Intoxication  5  Distracting injury  The C-Spine can be cleared clinically by these criteria; imaging is not required  There is also no thoracic or lumbar midline spinal tenderness    Cardiovascular:      Rate and Rhythm: Normal rate and regular rhythm  Pulses: Normal pulses  Heart sounds: No murmur heard  No friction rub  No gallop  Pulmonary:      Effort: Pulmonary effort is normal  No respiratory distress  Breath sounds:  No stridor  No wheezing, rhonchi or rales  Abdominal:      General: There is no distension  Palpations: Abdomen is soft  Tenderness: There is no abdominal tenderness  There is no rebound  Musculoskeletal:         General: Deformity present  No swelling, tenderness or signs of injury  Normal range of motion  Cervical back: Normal range of motion and neck supple  No rigidity or tenderness  Right lower leg: No edema  Left lower leg: No edema  Comments: Arthritic deformities   Skin:     General: Skin is warm and dry  Coloration: Skin is not jaundiced  Findings: No bruising, erythema or rash  Neurological:      Mental Status: She is alert and oriented to person, place, and time  Cranial Nerves: No cranial nerve deficit  Sensory: No sensory deficit  Coordination: Coordination normal    Psychiatric:         Mood and Affect: Mood normal          Behavior: Behavior normal          Cervical spine cleared by clinical criteria? Yes     Invasive Devices     None                 Lab Results:   Results Reviewed     Procedure Component Value Units Date/Time    FLU/RSV/COVID - if FLU/RSV clinically relevant [[id number]]  (Normal) Collected: 02/24/23 1253    Lab Status: Final result Specimen: Nares from Nose Updated: 02/24/23 1343     SARS-CoV-2 Negative     INFLUENZA A PCR Negative     INFLUENZA B PCR Negative     RSV PCR Negative    Narrative:      FOR PEDIATRIC PATIENTS - copy/paste COVID Guidelines URL to browser: [email or web address]    SARS-CoV-2 assay is a Nucleic Acid Amplification assay intended for the  qualitative detection of nucleic acid from SARS-CoV-2 in nasopharyngeal  swabs  Results are for the presumptive identification of SARS-CoV-2 RNA  Positive results are indicative of infection with SARS-CoV-2, the virus  causing COVID-19, but do not rule out bacterial infection or co-infection  with other viruses  Laboratories within the [place] and its  territories are required to report all positive results to the appropriate  public health authorities  Negative results do not preclude SARS-CoV-2  infection and should not be used as the sole basis for treatment or other  patient management decisions  Negative results must be combined with  clinical observations, patient history, and epidemiological information  This test has not been FDA cleared or approved  This test has been authorized by FDA under an Emergency Use Authorization  (EUA)  This test is only authorized for the duration of time the  declaration that circumstances exist justifying the authorization of the  emergency use of an in vitro diagnostic tests for detection of SARS-CoV-2  virus and/or diagnosis of COVID-19 infection under section 564(b)(1) of  the Act, 21 U  S C  805OZZ-6(N)(9), unless the authorization is terminated  or revoked sooner  The test has been validated but independent review by FDA  and CLIA is pending  Test performed using [employer] GeneXpert: This RT-PCR assay targets N2,  a region unique to SARS-CoV-2  A conserved region in the E-gene was chosen  for pan-Sarbecovirus detection which includes SARS-CoV-2  According to CMS-2020-01-R, this platform meets the definition of high-throughput technology      HS Troponin I 2hr [[id number]]     Lab Status: No result Specimen: Blood     HS Troponin 0hr (reflex protocol) [[id number]]  (Normal) Collected: 02/24/23 1253    Lab Status: Final result Specimen: Blood from Arm, Left Updated: 02/24/23 1335     hs TnI 0hr 6 ng/L     B-Type Natriuretic Peptide(BNP) [[id number]]  (Abnormal) Collected: 02/24/23 1253    Lab Status: Final result Specimen: Blood from Arm, Left Updated: 02/24/23 1333      pg/mL     Comprehensive metabolic panel [[id number]]  (Abnormal) Collected: 02/24/23 1253    Lab Status: Final result Specimen: Blood from Arm, Left Updated: 02/24/23 1323     Sodium 139 mmol/L Potassium 4 0 mmol/L      Chloride 104 mmol/L      CO2 27 mmol/L      ANION GAP 8 mmol/L      BUN 29 mg/dL      Creatinine 0 73 mg/dL      Glucose 165 mg/dL      Calcium 7 8 mg/dL      Corrected Calcium 8 4 mg/dL      AST 13 U/L      ALT 9 U/L      Alkaline Phosphatase 76 U/L      Total Protein 5 4 g/dL      Albumin 3 2 g/dL      Total Bilirubin 0 34 mg/dL      eGFR 69 ml/min/1 73sq m     Narrative:      [place] guidelines for Chronic Kidney Disease (CKD):   •  Stage 1 with normal or high GFR (GFR > 90 mL/min/1 73 square meters)  •  Stage 2 Mild CKD (GFR = 60-89 mL/min/1 73 square meters)  •  Stage 3A Moderate CKD (GFR = 45-59 mL/min/1 73 square meters)  •  Stage 3B Moderate CKD (GFR = 30-44 mL/min/1 73 square meters)  •  Stage 4 Severe CKD (GFR = 15-29 mL/min/1 73 square meters)  •  Stage 5 End Stage CKD (GFR <15 mL/min/1 73 square meters)  Note: GFR calculation is accurate only with a steady state creatinine    CBC and differential [[id number]]  (Abnormal) Collected: 02/24/23 1253    Lab Status: Final result Specimen: Blood from Arm, Left Updated: 02/24/23 1305     WBC 12 91 Thousand/uL      RBC 2 22 Million/uL      Hemoglobin 8 4 g/dL      Hematocrit 26 2 %       fL      MCH 37 8 pg      MCHC 32 1 g/dL      RDW 13 6 %      MPV 9 5 fL      Platelets 185 Thousands/uL      nRBC 0 /100 WBCs      Neutrophils Relative 81 %      Immat GRANS % 1 %      Lymphocytes Relative 12 %      Monocytes Relative 6 %      Eosinophils Relative 0 %      Basophils Relative 0 %      Neutrophils Absolute 10 36 Thousands/µL      Immature Grans Absolute 0 17 Thousand/uL      Lymphocytes Absolute 1 52 Thousands/µL      Monocytes Absolute 0 79 Thousand/µL      Eosinophils Absolute 0 03 Thousand/µL      Basophils Absolute 0 04 Thousands/µL     UA w Reflex to Microscopic w Reflex to Culture [[id number]]     Lab Status: No result Specimen: Urine                  Imaging Studies:   Direct to CT: Yes  TRAUMA - CT head wo contrast   Final Result by Homer Duarte MD (02/24 1319)      No acute intracranial abnormality  Workstation performed: [id number]         XR Trauma chest portable   Final Result by Homer Duarte MD (02/24 1320)      No radiographic findings of acute thoracic injury  Large hiatal hernia  Workstation performed: [id number]         XR Trauma pelvis ap only 1 or 2 vw   Final Result by Homer Duarte MD (02/24 1322)      No acute osseous abnormality  Workstation performed: [id number]               Procedures  ECG 12 Lead Documentation Only    Date/Time: 2/24/2023 1:02 PM  Performed by: Aldair Degroot DO  Authorized by: Aldair Degroot DO     ECG reviewed by me, the ED Provider: yes    Patient location:  ED  Rate:     ECG rate:  73  Rhythm:     Rhythm: sinus rhythm    Conduction:     Conduction: normal    T waves:     T waves: normal    Comments:      Similar to EKG from March 16, 2025             ED Course           Medical Decision Making  Syncopal episode rule out vasovagal versus cardiac rhythm disturbance or acute CNS lesion work-up in progress trauma evaluation called    Amount and/or Complexity of Data Reviewed  Labs: ordered  Radiology: ordered  Disposition  [facility] Transfer: No  Final diagnoses:   Syncope     Time reflects when diagnosis was documented in both MDM as applicable and the Disposition within this note     Time User Action Codes Description Comment    2/24/2023  1:19 PM Argenis Barahona Add [R55] Syncope       ED Disposition     ED Disposition   Admit    Condition   Stable    Date/Time   Fri Feb 24, 2023  1:19 PM    Comment   Case was discussed with Devon Crew** and the patient's admission status was agreed to be Admission Status: observation status to the service of Dr Marilou Samson              Follow-up Information    None       Patient's Medications   Discharge Prescriptions    No medications on file     No discharge procedures on file      PDMP Review     None          ED Provider  Electronically Signed by         Sarah Becker DO  02/24/23 [id number]

## 2023-02-24 NOTE — ASSESSMENT & PLAN NOTE
· History of PE and now CML  · Maintained on Xarelto 20mg daily by outpatient provider   · Sign of bleed during trauma eval in ED  · Continue

## 2023-02-25 LAB
ABO GROUP BLD: NORMAL
ALBUMIN SERPL BCP-MCNC: 2.7 G/DL (ref 3.5–5)
ALP SERPL-CCNC: 53 U/L (ref 34–104)
ALT SERPL W P-5'-P-CCNC: 8 U/L (ref 7–52)
ANION GAP SERPL CALCULATED.3IONS-SCNC: 1 MMOL/L (ref 4–13)
AST SERPL W P-5'-P-CCNC: 10 U/L (ref 13–39)
BASOPHILS # BLD AUTO: 0.02 THOUSANDS/ÂΜL (ref 0–0.1)
BASOPHILS NFR BLD AUTO: 0 % (ref 0–1)
BILIRUB SERPL-MCNC: 0.28 MG/DL (ref 0.2–1)
BLD GP AB SCN SERPL QL: NEGATIVE
BUN SERPL-MCNC: 19 MG/DL (ref 5–25)
CALCIUM ALBUM COR SERPL-MCNC: 8.3 MG/DL (ref 8.3–10.1)
CALCIUM SERPL-MCNC: 7.3 MG/DL (ref 8.4–10.2)
CHLORIDE SERPL-SCNC: 109 MMOL/L (ref 96–108)
CO2 SERPL-SCNC: 30 MMOL/L (ref 21–32)
CREAT SERPL-MCNC: 0.49 MG/DL (ref 0.6–1.3)
EOSINOPHIL # BLD AUTO: 0.02 THOUSAND/ÂΜL (ref 0–0.61)
EOSINOPHIL NFR BLD AUTO: 0 % (ref 0–6)
ERYTHROCYTE [DISTWIDTH] IN BLOOD BY AUTOMATED COUNT: 13.8 % (ref 11.6–15.1)
FERRITIN SERPL-MCNC: 50 NG/ML (ref 8–388)
FOLATE SERPL-MCNC: 16.6 NG/ML (ref 3.1–17.5)
GFR SERPL CREATININE-BSD FRML MDRD: 82 ML/MIN/1.73SQ M
GLUCOSE P FAST SERPL-MCNC: 82 MG/DL (ref 65–99)
GLUCOSE SERPL-MCNC: 82 MG/DL (ref 65–140)
HCT VFR BLD AUTO: 19.9 % (ref 34.8–46.1)
HCT VFR BLD AUTO: 21.5 % (ref 34.8–46.1)
HCT VFR BLD AUTO: 26.1 % (ref 34.8–46.1)
HCT VFR BLD AUTO: 26.2 % (ref 34.8–46.1)
HGB BLD-MCNC: 6.3 G/DL (ref 11.5–15.4)
HGB BLD-MCNC: 6.9 G/DL (ref 11.5–15.4)
HGB BLD-MCNC: 8.6 G/DL (ref 11.5–15.4)
HGB BLD-MCNC: 8.6 G/DL (ref 11.5–15.4)
IMM GRANULOCYTES # BLD AUTO: 0.05 THOUSAND/UL (ref 0–0.2)
IMM GRANULOCYTES NFR BLD AUTO: 1 % (ref 0–2)
IRON SATN MFR SERPL: 24 % (ref 15–50)
IRON SERPL-MCNC: 43 UG/DL (ref 50–170)
LYMPHOCYTES # BLD AUTO: 0.9 THOUSANDS/ÂΜL (ref 0.6–4.47)
LYMPHOCYTES NFR BLD AUTO: 13 % (ref 14–44)
MCH RBC QN AUTO: 37.1 PG (ref 26.8–34.3)
MCHC RBC AUTO-ENTMCNC: 31.7 G/DL (ref 31.4–37.4)
MCV RBC AUTO: 117 FL (ref 82–98)
MONOCYTES # BLD AUTO: 0.54 THOUSAND/ÂΜL (ref 0.17–1.22)
MONOCYTES NFR BLD AUTO: 8 % (ref 4–12)
NEUTROPHILS # BLD AUTO: 5.52 THOUSANDS/ÂΜL (ref 1.85–7.62)
NEUTS SEG NFR BLD AUTO: 78 % (ref 43–75)
NRBC BLD AUTO-RTO: 0 /100 WBCS
PLATELET # BLD AUTO: 204 THOUSANDS/UL (ref 149–390)
PMV BLD AUTO: 9.4 FL (ref 8.9–12.7)
POTASSIUM SERPL-SCNC: 3.6 MMOL/L (ref 3.5–5.3)
PROT SERPL-MCNC: 4.4 G/DL (ref 6.4–8.4)
RBC # BLD AUTO: 1.7 MILLION/UL (ref 3.81–5.12)
RH BLD: POSITIVE
SODIUM SERPL-SCNC: 140 MMOL/L (ref 135–147)
SPECIMEN EXPIRATION DATE: NORMAL
TIBC SERPL-MCNC: 176 UG/DL (ref 250–450)
VIT B12 SERPL-MCNC: 403 PG/ML (ref 100–900)
WBC # BLD AUTO: 7.05 THOUSAND/UL (ref 4.31–10.16)

## 2023-02-25 PROCEDURE — 30233N1 TRANSFUSION OF NONAUTOLOGOUS RED BLOOD CELLS INTO PERIPHERAL VEIN, PERCUTANEOUS APPROACH: ICD-10-PCS | Performed by: INTERNAL MEDICINE

## 2023-02-25 RX ORDER — FERROUS SULFATE 325(65) MG
325 TABLET ORAL
Status: DISCONTINUED | OUTPATIENT
Start: 2023-02-26 | End: 2023-02-28 | Stop reason: HOSPADM

## 2023-02-25 RX ADMIN — BUDESONIDE 0.5 MG: 0.5 INHALANT ORAL at 19:45

## 2023-02-25 RX ADMIN — BUDESONIDE 0.5 MG: 0.5 INHALANT ORAL at 08:30

## 2023-02-25 RX ADMIN — MONTELUKAST 10 MG: 10 TABLET, FILM COATED ORAL at 22:36

## 2023-02-25 RX ADMIN — PANTOPRAZOLE SODIUM 40 MG: 40 TABLET, DELAYED RELEASE ORAL at 08:45

## 2023-02-25 RX ADMIN — POTASSIUM CHLORIDE 10 MEQ: 750 TABLET, EXTENDED RELEASE ORAL at 17:11

## 2023-02-25 RX ADMIN — FLUTICASONE FUROATE AND VILANTEROL TRIFENATATE 1 PUFF: 200; 25 POWDER RESPIRATORY (INHALATION) at 08:46

## 2023-02-25 RX ADMIN — SODIUM CHLORIDE 125 ML/HR: 0.9 INJECTION, SOLUTION INTRAVENOUS at 02:11

## 2023-02-25 RX ADMIN — POTASSIUM CHLORIDE 10 MEQ: 750 TABLET, EXTENDED RELEASE ORAL at 08:45

## 2023-02-25 RX ADMIN — SODIUM CHLORIDE 125 ML/HR: 0.9 INJECTION, SOLUTION INTRAVENOUS at 13:53

## 2023-02-25 RX ADMIN — HYDROXYUREA 500 MG: 500 CAPSULE ORAL at 08:45

## 2023-02-25 RX ADMIN — AMLODIPINE BESYLATE 5 MG: 5 TABLET ORAL at 08:48

## 2023-02-25 NOTE — PLAN OF CARE
Problem: Potential for Falls  Goal: Patient will remain free of falls  Description: INTERVENTIONS:  - Educate patient/family on patient safety including physical limitations  - Instruct patient to call for assistance with activity   - Consult OT/PT to assist with strengthening/mobility   - Keep Call bell within reach  - Keep bed low and locked with side rails adjusted as appropriate  - Keep care items and personal belongings within reach  - Initiate and maintain comfort rounds  - Make Fall Risk Sign visible to staff  - Offer Toileting every 2 Hours, in advance of need  - Initiate/Maintain 2alarm  - Obtain necessary fall risk management equipment: yellow bracelet and socks  - Apply yellow socks and bracelet for high fall risk patients  - Consider moving patient to room near nurses station  Outcome: Progressing

## 2023-02-25 NOTE — CONSULTS
Consultation - [id number] Gastroenterology     Aries Angel 80 y o  female MRN: [id number]  Unit/Bed#: -01 Encounter: [id number]    Consults    ASSESSMENT and PLAN    -59-year-old female admitted with syncope  Noted to have bright red blood per rectum and anemia  On anticoagulation  No localizing symptoms  Suspect this was lower bleeding probably diverticular recommend transfusion and holding the anticoagulation and observation  If she stops bleeding I would not recommend endoscopic investigation given her age and comorbidities  We can discuss the risk-benefit ratio for resuming the anticoagulation and the timing    Chief Complaint   Patient presents with   • Fall     Pt to er after falling this am after using the restroom and passing out after a bowel movement       Physician Requesting Consult: Arvin Monaco is a 80y o  year old female admitted with syncope on anticoagulation with a history of CML  Noted to have bright red blood per rectum  Patient denies abdominal pain  She said she was not aware of the bleeding  She said she had a rather extreme bleed 2 years ago at [facility] and was treated with blood transfusions but did not require endoscopy  She has no localizing GI symptoms right now  Historical Information   Past Medical History:   Diagnosis Date   • COPD (chronic obstructive pulmonary disease) ([facility] [address] )    • Hiatal hernia    • Hypertension    • Melanoma in situ of the skin [facility])     Multiple sites removed   • Pneumonia    • Pulmonary embolism ([facility] [address] )     on Xarelto   • Skin cancer      Past Surgical History:   Procedure Laterality Date   • APPENDECTOMY     • HYSTERECTOMY     • [place] ESOPHAGOGASTRODUODENOSCOPY TRANSORAL DIAGNOSTIC N/A 5/1/2019    Procedure: ESOPHAGOGASTRODUODENOSCOPY (EGD);   Surgeon: Germain Locke MD;  Location: [facility] OR;  Service: Gastroenterology   • SKIN CANCER EXCISION     • SKIN CANCER EXCISION     • UPPER GASTROINTESTINAL ENDOSCOPY       Social History   Social History     Substance and Sexual Activity   Alcohol Use Never     Social History     Substance and Sexual Activity   Drug Use Never     Social History     Tobacco Use   Smoking Status Former   • Types: Cigarettes   • Quit date:    • Years since quittin 1   Smokeless Tobacco Never     Family History   Problem Relation Age of Onset   • Heart disease Mother    • Diabetes Father    • Diabetes Sister    • Cancer Brother    • Colon polyps Brother    • Diabetes Brother        Meds/Allergies     Current Facility-Administered Medications   Medication Dose Route Frequency   • amLODIPine (NORVASC) tablet 5 mg  5 mg Oral Daily   • budesonide (PULMICORT) inhalation solution 0 5 mg  0 5 mg Nebulization Q12H   • fluticasone-vilanterol 200-25 mcg/actuation 1 puff  1 puff Inhalation Daily   • hydroxyurea (HYDREA) capsule 500 mg  500 mg Oral Daily   • ipratropium-albuterol (DUO-NEB) 0 5-2 5 mg/3 mL inhalation solution 3 mL  3 mL Nebulization Q6H PRN   • montelukast (SINGULAIR) tablet 10 mg  10 mg Oral HS   • pantoprazole (PROTONIX) EC tablet 40 mg  40 mg Oral Daily Before Breakfast   • potassium chloride (K-DUR,KLOR-CON) CR tablet 10 mEq  10 mEq Oral BID   • sodium chloride 0 9 % infusion  125 mL/hr Intravenous Continuous     Medications Prior to Admission   Medication   • albuterol (PROVENTIL HFA,VENTOLIN HFA) 90 mcg/act inhaler   • amLODIPine (NORVASC) 10 mg tablet   • budesonide (PULMICORT) 0 5 mg/2 mL nebulizer solution   • guaiFENesin (MUCINEX) 600 mg 12 hr tablet   • hydroxyurea (HYDREA) 500 mg capsule   • mometasone-formoterol (DULERA) 200-5 MCG/ACT inhaler   • montelukast (SINGULAIR) 10 mg tablet   • pantoprazole (PROTONIX) 40 mg tablet   • potassium chloride (MICRO-K) 10 MEQ CR capsule   • XARELTO 20 MG tablet   • albuterol (2 5 mg/3 mL) 0 083 % nebulizer solution   • hydrochlorothiazide (HYDRODIURIL) 50 mg tablet       No Known Allergies    PHYSICAL EXAM    Blood pressure (!) 118/45, pulse 71, temperature (!) 97 2 °F (36 2 °C), temperature source Oral, resp  rate 16, height 4' 10" (1 473 m), weight 57 2 kg (126 lb), SpO2 96 %  Body mass index is 26 33 kg/m²  General Appearance: NAD, cooperative, alert  Eyes: Anicteric, PERRLA, EOMI  ENT:  Normocephalic, atraumatic, normal mucosa  Neck:  Supple, symmetrical, trachea midline  Resp:  Clear to auscultation bilaterally; no rales, rhonchi or wheezing; respirations unlabored   CV:  S1 S2, Regular rate and rhythm; no murmur, rub, or gallop  GI:  Soft, non-tender, non-distended; normal bowel sounds; no masses, no organomegaly   Rectal: Deferred  Musculoskeletal: No cyanosis, clubbing or edema  Normal ROM  Skin:  No jaundice, rashes, or lesions   Heme/Lymph: No palpable cervical lymphadenopathy  Psych: Normal affect, good eye contact  Neuro: No gross deficits, AAOx3    Lab Results   Component Value Date    GLUCOSE 90 03/16/2015    CALCIUM 7 3 (L) 02/25/2023     03/16/2015    K 3 6 02/25/2023    CO2 30 02/25/2023     (H) 02/25/2023    BUN 19 02/25/2023    CREATININE 0 49 (L) 02/25/2023     Lab Results   Component Value Date    WBC 7 05 02/25/2023    HGB 6 9 (LL) 02/25/2023    HCT 21 5 (L) 02/25/2023     (H) 02/25/2023     02/25/2023     Lab Results   Component Value Date    ALT 8 02/25/2023    AST 10 (L) 02/25/2023    ALKPHOS 53 02/25/2023    BILITOT 0 3 03/16/2015     No results found for: AMYLASE  No results found for: LIPASE  No results found for: IRON, TIBC, FERRITIN  No results found for: INR    Imaging Studies: I have personally reviewed pertinent reports  EKG, Pathology, and Other Studies: I have personally reviewed pertinent reports  REVIEW OF SYSTEMS:    CONSTITUTIONAL: Denies any fever, chills, rigors, and weight loss  HEENT: No earache or tinnitus  Denies hearing loss or visual disturbances  CARDIOVASCULAR: No chest pain or palpitations     RESPIRATORY: Denies any cough, hemoptysis, shortness of breath or dyspnea on exertion  GASTROINTESTINAL: As noted in the History of Present Illness  GENITOURINARY: No problems with urination  Denies any hematuria or dysuria  NEUROLOGIC: No dizziness or vertigo, denies headaches  MUSCULOSKELETAL: Denies any muscle or joint pain  SKIN: Denies skin rashes or itching  ENDOCRINE: Denies excessive thirst  Denies intolerance to heat or cold  PSYCHOSOCIAL: Denies depression or anxiety  Denies any recent memory loss

## 2023-02-25 NOTE — QUICK NOTE
Notified of morning hemoglobin at 6 3  Repeat hemoglobin drawn from opposite arm with IV fluids confirmed hemoglobin at 6 9  Patient consented for blood  1 unit PRBCs ordered  H&H to be checked 2 hours after transfusion  Occult stools ordered  Iron panel ordered

## 2023-02-25 NOTE — PROGRESS NOTES
Abhay Jaureguion  Progress Note - Tere Aguilar 3/22/1926, 80 y o  female MRN: [id number]  Unit/Bed#: -01 Encounter: [id number]  Primary Care Provider: SLIM Cheung   Date and time admitted to hospital: 2/24/2023 12:40 PM    * Syncope  Assessment & Plan  · Suspect vasovagal as this occurred while patient was having a BM on the toilet   She had a similar episode last year  · Trauma workup in ED negative  · Trops WNL  ·   · Orthostatic vitals positive on 2/24   · Continue IVF and repeat   · PT/OT  · Echo 2/24 pending read   · Monitor on telemetry for 24 hours    Suspect 2/2 orthostatic hypotension vs acute anemia/GIB     BRBPR (bright red blood per rectum)  Assessment & Plan  · Patient reported melanic stools PTA, noted to have BRPR while inpatient + acute anemia  · Hgb 8 --> 6 9  · S/P 1 unit PRBC 2/25  · GI consulted   · Xarelto is held   · Suspect this was lower bleeding probably diverticular  · Recommend transfusion and holding the anticoagulation and observation    Anemia  Assessment & Plan  · Baseline Hgb 12 6 in Jan 2023  · Hgb on admission 8 6 --> dropped to 6 9  · Acute GIB/BRBPR  · Iron panel: Iron 43, Iron sat 24, TIBC 176, Ferritin 50   · B12/folate pending  · FOBT pending  · S/P 1 unit PRBC 2/25  · Monitor H&H, hold all AC     CML (chronic myelocytic leukemia) ([facility] [address] )  Assessment & Plan  · 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 and takes a small white pill every day for it" he claims this was discovered during and admission for pneumonia last year when WBCs were 27K  · Patient is on hydroxyurea, continue  · Follows with Dr Yaritza Fuentes in [place]  · Declined bone marrow biopsy due to patient age  · Continue outpatient follow up  · WBC 12 9 on admission --> resolved to 7 without abx  · CXR & UA without evidence of infection     Hypertension  Assessment & Plan  · Maintained on HCTZ and Norvasc outpatient  · Resume Norvasc at 5mg  · Hold HCTZ- will likely discontinue  · Pressures stable    COPD (chronic obstructive pulmonary disease) ([facility])  Assessment & Plan  · Not in acute exacerbation    Large hiatal hernia  Assessment & Plan  · Continue PPI    Chronic anticoagulation  Assessment & Plan  · History of PE and now CML  · Maintained on Xarelto 20mg daily --> on hold due to acute bleeding/anemia     VTE Pharmacologic Prophylaxis: VTE Score: 9 High Risk (Score >/= 5) - Pharmacological DVT Prophylaxis Contraindicated  Sequential Compression Devices Ordered  Patient Centered Rounds: I performed bedside rounds with nursing staff today  Discussions with Specialists or Other Care Team Provider:     Education and Discussions with Family / Patient: called son's number in chart --> told that this was wrong number  Total Time Spent on Date of Encounter in care of patient: 35 minutes This time was spent on one or more of the following: performing physical exam; counseling and coordination of care; obtaining or reviewing history; documenting in the medical record; reviewing/ordering tests, medications or procedures; communicating with other healthcare professionals and discussing with patient's family/caregivers  Current Length of Stay: 0 day(s)  Current Patient Status: Observation   Certification Statement: The patient will continue to require additional inpatient hospital stay due to acute anemia  Discharge Plan: Anticipate discharge in 24-48 hrs to discharge location to be determined pending rehab evaluations  Code Status: Level 3 - DNAR and DNI    Subjective:   A&O x 3, denies pain or complaints, no abdominal tenderness  Unsure of last BM  Objective:     Vitals:   Temp (24hrs), Av 5 °F (36 4 °C), Min:97 2 °F (36 2 °C), Max:97 8 °F (36 6 °C)    Temp:  [97 2 °F (36 2 °C)-97 8 °F (36 6 °C)] 97 2 °F (36 2 °C)  HR:  [70-88] 71  Resp:  [16-23] 16  BP: ()/(35-64) 118/45  SpO2:  [91 %-99 %] 96 %  Body mass index is 26 33 kg/m²       Input and Output Summary (last 24 hours): Intake/Output Summary (Last 24 hours) at 2/25/2023 1406  Last data filed at 2/25/2023 1353  Gross per 24 hour   Intake 3289 58 ml   Output 600 ml   Net 2689 58 ml       Physical Exam:   Physical Exam  Vitals and nursing note reviewed  Constitutional:       General: She is not in acute distress  Appearance: She is well-developed  HENT:      Head: Normocephalic and atraumatic  Eyes:      General:         Right eye: No discharge  Left eye: No discharge  Conjunctiva/sclera: Conjunctivae normal    Cardiovascular:      Rate and Rhythm: Normal rate and regular rhythm  Heart sounds: No murmur heard  Pulmonary:      Effort: Pulmonary effort is normal  No respiratory distress  Breath sounds: Rhonchi present  No wheezing or rales  Abdominal:      General: There is no distension  Palpations: Abdomen is soft  Tenderness: There is no abdominal tenderness  Musculoskeletal:      Cervical back: Neck supple  Right lower leg: No edema  Left lower leg: No edema  Skin:     General: Skin is warm and dry  Capillary Refill: Capillary refill takes less than 2 seconds  Neurological:      Mental Status: She is alert and oriented to person, place, and time  Psychiatric:         Mood and Affect: Mood normal          Behavior: Behavior normal           Additional Data:     Labs:  Results from last 7 days   Lab Units 02/25/23  1337 02/25/23  0624 02/25/23  0537   WBC Thousand/uL  --   --  7 05   HEMOGLOBIN g/dL 8 6*   < > 6 3*   HEMATOCRIT % 26 1*   < > 19 9*   PLATELETS Thousands/uL  --   --  204   NEUTROS PCT %  --   --  78*   LYMPHS PCT %  --   --  13*   MONOS PCT %  --   --  8   EOS PCT %  --   --  0    < > = values in this interval not displayed       Results from last 7 days   Lab Units 02/25/23  0429   SODIUM mmol/L 140   POTASSIUM mmol/L 3 6   CHLORIDE mmol/L 109*   CO2 mmol/L 30   BUN mg/dL 19   CREATININE mg/dL 0 49*   ANION GAP mmol/L 1*   CALCIUM mg/dL 7 3*   ALBUMIN g/dL 2 7*   TOTAL BILIRUBIN mg/dL 0 28   ALK PHOS U/L 53   ALT U/L 8   AST U/L 10*   GLUCOSE RANDOM mg/dL 82                       Lines/Drains:  Invasive Devices     Peripheral Intravenous Line  Duration           Peripheral IV 02/25/23 Dorsal (posterior); Left;Proximal Forearm <1 day                  Telemetry:  Telemetry Orders (From admission, onward)             24 Hour Telemetry Monitoring  (ED Bridging Orders Panel)  Continuous x 24 Hours (Telem)        Expiring   References:    Telemetry Guidelines   Question:  Reason for 24 Hour Telemetry  Answer:  Syncope suspected to be cardiac in origin                 Telemetry Reviewed: Normal Sinus Rhythm HR 70s  Indication for Continued Telemetry Use: No indication for continued use  Will discontinue                Imaging: Reviewed radiology reports from this admission including: chest xray, CT head and xray(s)    Recent Cultures (last 7 days):         Last 24 Hours Medication List:   Current Facility-Administered Medications   Medication Dose Route Frequency Provider Last Rate   • amLODIPine  5 mg Oral Daily Bev JUNG PA-C     • budesonide  0 5 mg Nebulization Q12H Mae Parikh MD     • [START ON 2/26/2023] ferrous sulfate  325 mg Oral Daily With Breakfast Pito Moss PA-C     • fluticasone-vilanterol  1 puff Inhalation Daily Bev JUNG PA-C     • hydroxyurea  500 mg Oral Daily Bev JUNG PA-C     • ipratropium-albuterol  3 mL Nebulization Q6H PRN Bev JUNG PA-C     • montelukast  10 mg Oral HS Bev JUNG PA-C     • pantoprazole  40 mg Oral Daily Before Breakfast Bev JUNG PA-C     • potassium chloride  10 mEq Oral BID Bev JUNG PA-C     • sodium chloride  125 mL/hr Intravenous Continuous Arthur Joe,  mL/hr (02/25/23 1353)        Today, Patient Was Seen By: Pricila Adorno PA-C    **Please Note: This note may have been constructed using a voice recognition system  **

## 2023-02-25 NOTE — UTILIZATION REVIEW
Initial Clinical Review    WAS OBSERVATION 2/24/23 @ 9397 CONVERTED TO INPATIENT ADMISSION 2/25/23 @ 1453 DUE TO CONTINUED STAY REQUIRED TO CARE FOR PATIENT WITH DX: SYNCOPE  Admission: Date/Time/Statement:   Admission Orders (From admission, onward)     Ordered        02/25/23 1453  Inpatient Admission  Once                      Orders Placed This Encounter   Procedures   • Inpatient Admission     Standing Status:   Standing     Number of Occurrences:   1     Order Specific Question:   Level of Care     Answer:   Med Surg [16]     Order Specific Question:   Estimated length of stay     Answer:   More than 2 Midnights     Order Specific Question:   Certification     Answer:   I certify that inpatient services are medically necessary for this patient for a duration of greater than two midnights  See H&P and MD Progress Notes for additional information about the patient's course of treatment  ED Arrival Information     Expected   -    Arrival   2/24/2023 12:37    Acuity   Emergent            Means of arrival   Wheelchair    Escorted by   Family Member    Service   Hospitalist    Admission type   Emergency            Arrival complaint   fall, syncope, altered mental status           Chief Complaint   Patient presents with   • Fall     Pt to er after falling this am after using the restroom and passing out after a bowel movement       Initial Presentation: 80 y o  female to ED via [facility] from home  Present with a PMH of COPD, hypertension, CML, prior PE on lifelong Xarelto who presents with syncopal episode  Reports that she was having a bowel movement when she lost consciousness and landed on the ground  Has been having recent diarrhea  She could not get up, called her son whom she lives with to assist her  did have some lightheadedness and dizziness associated with the event   Admitted to OBS with DX: syncope  on exam: bright red blood noted in diaper;  Heme 12 9 repeat Heme 6 3; WNC 12 91; Neutro % 81; BUN 29; Albumin 3 2; gluc 165  Given in ED - IVF @ 125; DuoNeb  PLAN: cont ivf @ 125; f/u echo; PT/ OT eval / tx; Cardiopulmonary monitoring; orthstatic bp's; transfuse 1 unit PRBS; monitor labs; trend H/H; f/u occult stool; GI Consult    Date 2/25/23   Day 1 - CHANGED TO INPATIENT   Orthostatic vitals positive on 2/24; rec'd 1 unit PRBC's; lungs with rhonchi; H/H 8 6 (post transfusion); AG 1; albumin 2 7   Plan: cont ivf @ 125; cont nebs; f/u echo; monitor labs; trend H/H; hold xeralto; PT / OT eval / tx; f/u occult stool    GI CONSULT: Noted to have bright red blood per rectum and anemia; Suspect this was lower bleeding probably diverticular recommend transfusion and holding the anticoagulation and observation      Date 2/26/23   Day 2 - INPATIENT   Repeat Orthostatic BP's neg; Lungs diminished; H/H 8 3 / 25 2  Plan: PT / OT eval / tx; f/u occult stool; start PO Iron    ED Triage Vitals   Temperature Pulse Respirations Blood Pressure SpO2   02/24/23 1246 02/24/23 1248 02/24/23 1246 02/24/23 1246 02/24/23 1246   98 7 °F (37 1 °C) 76 16 152/63 96 %      Temp Source Heart Rate Source Patient Position - Orthostatic VS BP Location FiO2 (%)   02/24/23 1246 02/24/23 1248 02/24/23 2344 02/24/23 2344 --   Temporal Monitor Lying - Orthostatic VS Left arm       Pain Score       02/24/23 2000       No Pain          Wt Readings from Last 1 Encounters:   02/24/23 57 2 kg (126 lb)     Additional Vital Signs:   Date/Time Temp Pulse Resp BP MAP (mmHg) SpO2 O2 Device Patient Position - Orthostatic VS   02/26/23 0909 -- -- -- -- -- 97 % None (Room air) --   02/26/23 07:39:32 97 5 °F (36 4 °C) 72 20 115/55 75 93 % -- --   02/25/23 21:35:09 -- 80 -- 140/55 83 95 % -- --   02/25/23 1946 -- -- -- -- -- 96 % None (Room air) --   02/25/23 1517 -- -- -- 133/54 -- -- -- Standing - Orthostatic VS   02/25/23 1516 -- -- -- 143/65 -- -- -- Sitting - Orthostatic VS   02/25/23 1515 -- -- -- 146/70 -- -- -- Lying - Orthostatic VS   02/25/23 15:10:32 97 4 °F (36 3 °C) Abnormal  75 16 -- -- 95 % None (Room air) --         Date/Time Temp Pulse Resp BP MAP (mmHg) SpO2 O2 Device Patient Position - Orthostatic VS   02/25/23 09:57:43 97 2 °F (36 2 °C) Abnormal  71 16 118/45 Abnormal  69 96 % None (Room air) Lying   02/25/23 0915 97 3 °F (36 3 °C) Abnormal  74 16 115/45 Abnormal  -- 98 % None (Room air) --   02/25/23 0840 97 4 °F (36 3 °C) Abnormal  79 18 114/45 Abnormal  -- 92 % None (Room air) --   02/25/23 0833 -- -- -- -- -- 91 % None (Room air) --   02/25/23 0825 97 6 °F (36 4 °C) 86 16 108/49 Abnormal  -- 97 % None (Room air) --   02/25/23 0815 97 8 °F (36 6 °C) 86 16 115/64 -- 96 % None (Room air) --   02/25/23 0805 -- -- -- -- -- 95 % None (Room air) --   02/25/23 07:43:21 97 7 °F (36 5 °C) 75 18 124/52 76 96 % None (Room air) --   02/24/23 2350 -- 81 -- 134/52 -- 96 % None (Room air) Lying   02/24/23 2349 -- 81 -- 61/35 Abnormal  -- -- -- Standing - Orthostatic VS   02/24/23 2345 -- 88 -- 116/49 Abnormal  -- -- -- Sitting - Orthostatic VS   02/24/23 2344 -- 78 -- 119/49 Abnormal  -- -- -- Lying - Orthostatic VS   02/24/23 2047 -- -- -- -- -- 93 % None (Room air) --   02/24/23 20:27:14 -- 72 -- 122/49 Abnormal  73 95 % -- --   02/24/23 2000 -- -- -- -- -- -- None (Room air) --   02/24/23 19:16:14 97 2 °F (36 2 °C) Abnormal  78 16 87/56 Abnormal  66 98 % -- --   02/24/23 1700 -- 74 18 129/62 89 96 % -- --   02/24/23 1647 -- 74 -- 138/61 -- -- -- --   02/24/23 1645 -- 79 22 -- -- 97 % -- --   02/24/23 1530 -- 76 21 128/61 -- 97 % -- --   02/24/23 1500 -- 77 23 Abnormal  118/57 -- 97 % -- --   02/24/23 1430 -- 70 16 138/61 -- 99 % -- --   02/24/23 1400 -- 67 15 124/59 -- 97 % -- --   02/24/23 1345 -- 67 17 120/58 -- 97 % -- --   02/24/23 1330 -- 69 20 120/58 -- 99 % -- --   02/24/23 1315 -- 68 16 121/58 -- 97 % -- --   02/24/23 1300 -- 74 16 121/58 -- 97 % -- --   02/24/23 1248 -- 76 -- -- -- -- -- --   02/24/23 12:46:11 98 7 °F (37 1 °C) -- 16 152/63 -- 96 % None (Room air) --           EKG:  Rate:     ECG rate:  73   Rhythm:     Rhythm: sinus rhythm     Conduction:     Conduction: normal     T waves:     T waves: normal     Comments:      Similar to EKG from March 16, 2025    Pertinent Labs/Diagnostic Test Results:   TRAUMA - CT head wo contrast   Final Result by Homer Duarte MD (02/24 1319)      No acute intracranial abnormality  Workstation performed: [id number]         XR Trauma chest portable   Final Result by Homer Duarte MD (02/24 1320)      No radiographic findings of acute thoracic injury  Large hiatal hernia  Workstation performed: [id number]         XR Trauma pelvis ap only 1 or 2 vw   Final Result by Homer Duarte MD (02/24 1322)      No acute osseous abnormality        Workstation performed: [id number]           Results from last 7 days   Lab Units 02/24/23  1253   SARS-COV-2  Negative     Results from last 7 days   Lab Units 02/26/23  0404 02/25/23  2031 02/25/23  1337 02/25/23  0624 02/25/23  0537 02/24/23  1253   WBC Thousand/uL 9 26  --   --   --  7 05 12 91*   HEMOGLOBIN g/dL 8 3* 8 6* 8 6* 6 9* 6 3* 8 4*   HEMATOCRIT % 25 2* 26 2* 26 1* 21 5* 19 9* 26 2*   PLATELETS Thousands/uL 239  --   --   --  204 318   NEUTROS ABS Thousands/µL 7 22  --   --   --  5 52 10 36*         Results from last 7 days   Lab Units 02/26/23  0404 02/25/23  0429 02/24/23  1253   SODIUM mmol/L 142 140 139   POTASSIUM mmol/L 3 8 3 6 4 0   CHLORIDE mmol/L 109* 109* 104   CO2 mmol/L 29 30 27   ANION GAP mmol/L 4 1* 8   BUN mg/dL 14 19 29*   CREATININE mg/dL 0 53* 0 49* 0 73   EGFR ml/min/1 73sq m 80 82 69   CALCIUM mg/dL 7 8* 7 3* 7 8*     Results from last 7 days   Lab Units 02/26/23  0404 02/25/23  0429 02/24/23  1253   AST U/L 14 10* 13   ALT U/L 10 8 9   ALK PHOS U/L 77 53 76   TOTAL PROTEIN g/dL 5 1* 4 4* 5 4*   ALBUMIN g/dL 3 1* 2 7* 3 2*   TOTAL BILIRUBIN mg/dL 0 52 0 28 0 34         Results from last 7 days   Lab Units 02/26/23  0404 02/25/23  0429 02/24/23  1253   GLUCOSE RANDOM mg/dL 92 82 165*       Results from last 7 days   Lab Units 02/24/23  1737 02/24/23  1507 02/24/23  1253   HS TNI 0HR ng/L  --   --  6   HS TNI 2HR ng/L  --  6  --    HSTNI D2 ng/L  --  0  --    HS TNI 4HR ng/L 6  --   --    HSTNI D4 ng/L 0  --   --        Results from last 7 days   Lab Units 02/24/23  1253   BNP pg/mL 101*       Results from last 7 days   Lab Units 02/24/23  1604   CLARITY UA  Clear   COLOR UA  Yellow   SPEC GRAV UA  >=1 030   PH UA  5 5   GLUCOSE UA mg/dl Negative   KETONES UA mg/dl Negative   BLOOD UA  Large*   PROTEIN UA mg/dl Negative   NITRITE UA  Negative   BILIRUBIN UA  Negative   UROBILINOGEN UA (BE) mg/dl <2 0   LEUKOCYTES UA  Negative   WBC UA /hpf 0-1   RBC UA /hpf 0-1   BACTERIA UA /hpf Occasional   EPITHELIAL CELLS WET PREP /hpf Moderate*   MUCUS THREADS  Occasional*     Results from last 7 days   Lab Units 02/24/23  1253   INFLUENZA A PCR  Negative   INFLUENZA B PCR  Negative   RSV PCR  Negative       ED Treatment:   Medication Administration from 02/24/2023 1236 to 02/24/2023 1910       Date/Time Order Dose Route Action     02/24/2023 1420 EST sodium chloride 0 9 % infusion 125 mL/hr Intravenous New Bag     02/24/2023 1541 EST amLODIPine (NORVASC) tablet 5 mg 5 mg Oral Given     02/24/2023 1541 EST rivaroxaban (XARELTO) tablet 20 mg 20 mg Oral Given     02/24/2023 1541 EST pantoprazole (PROTONIX) EC tablet 40 mg 40 mg Oral Given     02/24/2023 1852 EST potassium chloride (K-DUR,KLOR-CON) CR tablet 10 mEq 10 mEq Oral Given     02/24/2023 1541 EST ipratropium-albuterol (DUO-NEB) 0 5-2 5 mg/3 mL inhalation solution 3 mL 3 mL Nebulization Given          Present on Admission:  • Large hiatal hernia  • Hypertension  • Anemia      Admitting Diagnosis: Dehydration [E86 0]  Seizure (HCC) [R56 9]  Syncope [R55]  Anemia [D64 9]  BRBPR (bright red blood per rectum) [K62 5]     Age/Sex: 80 y o  female     Admission Orders: SCD's; Cardiopulmonary monitoring; orthostatic bp's; regular diet    Scheduled Medications:  amLODIPine, 5 mg, Oral, Daily  budesonide, 0 5 mg, Nebulization, Q12H  [START ON 2/26/2023] ferrous sulfate, 325 mg, Oral, Daily With Breakfast  fluticasone-vilanterol, 1 puff, Inhalation, Daily  hydroxyurea, 500 mg, Oral, Daily  montelukast, 10 mg, Oral, HS  pantoprazole, 40 mg, Oral, Daily Before Breakfast  potassium chloride, 10 mEq, Oral, BID      Continuous IV Infusions: sodium chloride, 125 mL/hr, Intravenous, Continuous      PRN Meds:  ipratropium-albuterol, 3 mL, Nebulization, Q6H PRN        IP CONSULT TO GASTROENTEROLOGY  IP CONSULT TO CASE MANAGEMENT    Network Utilization Review Department  ATTENTION: Please call with any questions or concerns to [phone] and carefully listen to the prompts so that you are directed to the right person  All voicemails are confidential   Judy Curtis all requests for admission clinical reviews, approved or denied determinations and any other requests to dedicated fax number below belonging to the campus where the patient is receiving treatment   List of dedicated fax numbers for the Facilities:  [address] DENIALS (Administrative/Medical Necessity) [phone]   [address] (Maternity/NICU/Pediatrics) [phone]   910 Maureen Lyman [phone]   Rjyann Chen [address] [phone]   [address] [address] [address] 28 [phone]   [facility] 815 [facility] [phone]

## 2023-02-25 NOTE — ASSESSMENT & PLAN NOTE
· Baseline Hgb 12 6 in Jan 2023  · Hgb on admission 8 6 --> dropped to 6 9  · Acute GIB/BRBPR  · Iron panel: Iron 43, Iron sat 24, TIBC 176, Ferritin 50   · B12/folate pending  · FOBT pending  · S/P 1 unit PRBC 2/25  · Monitor H&H, hold all AC

## 2023-02-25 NOTE — ASSESSMENT & PLAN NOTE
· 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 and takes a small white pill every day for it" he claims this was discovered during and admission for pneumonia last year when WBCs were 27K  · Patient is on hydroxyurea, continue  · Follows with Dr Luz Elena Britton in [place]  · Declined bone marrow biopsy due to patient age  · Continue outpatient follow up  · WBC 12 9 on admission --> resolved to 7 without abx  · CXR & UA without evidence of infection

## 2023-02-25 NOTE — ASSESSMENT & PLAN NOTE
· History of PE and now CML  · Maintained on Xarelto 20mg daily --> on hold due to acute bleeding/anemia

## 2023-02-25 NOTE — ASSESSMENT & PLAN NOTE
· Patient reported melanic stools PTA, noted to have BRPR while inpatient + acute anemia  · Hgb 8 --> 6 9  · S/P 1 unit PRBC 2/25  · GI consulted   · Xarelto is held   · Suspect this was lower bleeding probably diverticular  · Recommend transfusion and holding the anticoagulation and observation

## 2023-02-25 NOTE — ASSESSMENT & PLAN NOTE
· Suspect vasovagal as this occurred while patient was having a BM on the toilet   She had a similar episode last year  · Trauma workup in ED negative  · Trops WNL  ·   · Orthostatic vitals positive on 2/24   · Continue IVF and repeat   · PT/OT  · Echo 2/24 pending read   · Monitor on telemetry for 24 hours    Suspect 2/2 orthostatic hypotension vs acute anemia/GIB

## 2023-02-25 NOTE — QUICK NOTE
I got a message from the blood bank from Kam Cabezas who stated patient does not meet blood inventory crisis response criteria  I explained also to [facility] (from blood bank) that the patient came in with hemoglobin of 8 4 and has dropped overnight to 6 3--> 6 9 overnight while having bright red rectal bleeding  She will reach out to pathology and get back about the patient      Irma Forbes

## 2023-02-25 NOTE — PLAN OF CARE
Problem: Potential for Falls  Goal: Patient will remain free of falls  Description: INTERVENTIONS:  - Educate patient/family on patient safety including physical limitations  - Instruct patient to call for assistance with activity   - Consult OT/PT to assist with strengthening/mobility   - Keep Call bell within reach  - Keep bed low and locked with side rails adjusted as appropriate  - Keep care items and personal belongings within reach  - Initiate and maintain comfort rounds  - Make Fall Risk Sign visible to staff  - Offer Toileting every 2 Hours, in advance of need  - Initiate/Maintain bed/chair alarm  - Obtain necessary fall risk management equipment: alarms, walker  - Apply yellow socks and bracelet for high fall risk patients  - Consider moving patient to room near nurses station  Outcome: Progressing     Problem: MOBILITY - ADULT  Goal: Maintain or return to baseline ADL function  Description: INTERVENTIONS:  -  Assess patient's ability to carry out ADLs; assess patient's baseline for ADL function and identify physical deficits which impact ability to perform ADLs (bathing, care of mouth/teeth, toileting, grooming, dressing, etc )  - Assess/evaluate cause of self-care deficits   - Assess range of motion  - Assess patient's mobility; develop plan if impaired  - Assess patient's need for assistive devices and provide as appropriate  - Encourage maximum independence but intervene and supervise when necessary  - Involve family in performance of ADLs  - Assess for home care needs following discharge   - Consider OT consult to assist with ADL evaluation and planning for discharge  - Provide patient education as appropriate  Outcome: Progressing  Goal: Maintains/Returns to pre admission functional level  Description: INTERVENTIONS:  - Perform BMAT or MOVE assessment daily    - Set and communicate daily mobility goal to care team and patient/family/caregiver     - Collaborate with rehabilitation services on mobility goals if consulted  - Perform Range of Motion 3 times a day  - Reposition patient every 2 hours    - Dangle patient 3 times a day  - Stand patient 3 times a day  - Ambulate patient 3 times a day  - Out of bed to chair 3 times a day for meals  - Out of bed for toileting  - Record patient progress and toleration of activity level   Outcome: Progressing     Problem: PAIN - ADULT  Goal: Verbalizes/displays adequate comfort level or baseline comfort level  Description: Interventions:  - Encourage patient to monitor pain and request assistance  - Assess pain using appropriate pain scale  - Administer analgesics based on type and severity of pain and evaluate response  - Implement non-pharmacological measures as appropriate and evaluate response  - Consider cultural and social influences on pain and pain management  - Notify physician/advanced practitioner if interventions unsuccessful or patient reports new pain  Outcome: Progressing     Problem: INFECTION - ADULT  Goal: Absence or prevention of progression during hospitalization  Description: INTERVENTIONS:  - Assess and monitor for signs and symptoms of infection  - Monitor lab/diagnostic results  - Monitor all insertion sites, i e  indwelling lines, tubes, and drains  - Monitor endotracheal if appropriate and nasal secretions for changes in amount and color  - [place] appropriate cooling/warming therapies per order  - Administer medications as ordered  - Instruct and encourage patient and family to use good hand hygiene technique  - Identify and instruct in appropriate isolation precautions for identified infection/condition  Outcome: Progressing     Problem: DISCHARGE PLANNING  Goal: Discharge to home or other facility with appropriate resources  Description: INTERVENTIONS:  - Identify barriers to discharge w/patient and caregiver  - Arrange for needed discharge resources and transportation as appropriate  - Identify discharge learning needs (meds, wound care, etc )  - Arrange for interpretive services to assist at discharge as needed  - Refer to Case Management Department for coordinating discharge planning if the patient needs post-hospital services based on physician/advanced practitioner order or complex needs related to functional status, cognitive ability, or social support system  Outcome: Progressing     Problem: Knowledge Deficit  Goal: Patient/family/caregiver demonstrates understanding of disease process, treatment plan, medications, and discharge instructions  Description: Complete learning assessment and assess knowledge base    Interventions:  - Provide teaching at level of understanding  - Provide teaching via preferred learning methods  Outcome: Progressing     Problem: Prexisting or High Potential for Compromised Skin Integrity  Goal: Skin integrity is maintained or improved  Description: INTERVENTIONS:  - Identify patients at risk for skin breakdown  - Assess and monitor skin integrity  - Assess and monitor nutrition and hydration status  - Monitor labs   - Assess for incontinence   - Turn and reposition patient  - Assist with mobility/ambulation  - Relieve pressure over bony prominences  - Avoid friction and shearing  - Provide appropriate hygiene as needed including keeping skin clean and dry  - Evaluate need for skin moisturizer/barrier cream  - Collaborate with interdisciplinary team   - Patient/family teaching  - Consider wound care consult   Outcome: Progressing     Problem: GASTROINTESTINAL - ADULT  Goal: Maintains or returns to baseline bowel function  Description: INTERVENTIONS:  - Assess bowel function  - Encourage oral fluids to ensure adequate hydration  - Administer IV fluids if ordered to ensure adequate hydration  - Administer ordered medications as needed  - Encourage mobilization and activity  - Consider nutritional services referral to assist patient with adequate nutrition and appropriate food choices  Outcome: Progressing     Problem: HEMATOLOGIC - ADULT  Goal: Maintains hematologic stability  Description: INTERVENTIONS  - Assess for signs and symptoms of bleeding or hemorrhage  - Monitor labs  - Administer supportive blood products/factors as ordered and appropriate  Outcome: Progressing

## 2023-02-26 LAB
ABO GROUP BLD BPU: NORMAL
ALBUMIN SERPL BCP-MCNC: 3.1 G/DL (ref 3.5–5)
ALP SERPL-CCNC: 77 U/L (ref 34–104)
ALT SERPL W P-5'-P-CCNC: 10 U/L (ref 7–52)
ANION GAP SERPL CALCULATED.3IONS-SCNC: 4 MMOL/L (ref 4–13)
AST SERPL W P-5'-P-CCNC: 14 U/L (ref 13–39)
BASOPHILS # BLD AUTO: 0.04 THOUSANDS/ÂΜL (ref 0–0.1)
BASOPHILS NFR BLD AUTO: 0 % (ref 0–1)
BILIRUB SERPL-MCNC: 0.52 MG/DL (ref 0.2–1)
BPU ID: NORMAL
BUN SERPL-MCNC: 14 MG/DL (ref 5–25)
CALCIUM ALBUM COR SERPL-MCNC: 8.5 MG/DL (ref 8.3–10.1)
CALCIUM SERPL-MCNC: 7.8 MG/DL (ref 8.4–10.2)
CHLORIDE SERPL-SCNC: 109 MMOL/L (ref 96–108)
CO2 SERPL-SCNC: 29 MMOL/L (ref 21–32)
CREAT SERPL-MCNC: 0.53 MG/DL (ref 0.6–1.3)
CROSSMATCH: NORMAL
EOSINOPHIL # BLD AUTO: 0.03 THOUSAND/ÂΜL (ref 0–0.61)
EOSINOPHIL NFR BLD AUTO: 0 % (ref 0–6)
ERYTHROCYTE [DISTWIDTH] IN BLOOD BY AUTOMATED COUNT: 18.7 % (ref 11.6–15.1)
GFR SERPL CREATININE-BSD FRML MDRD: 80 ML/MIN/1.73SQ M
GLUCOSE SERPL-MCNC: 92 MG/DL (ref 65–140)
HCT VFR BLD AUTO: 25.2 % (ref 34.8–46.1)
HGB BLD-MCNC: 8.3 G/DL (ref 11.5–15.4)
IMM GRANULOCYTES # BLD AUTO: 0.08 THOUSAND/UL (ref 0–0.2)
IMM GRANULOCYTES NFR BLD AUTO: 1 % (ref 0–2)
LYMPHOCYTES # BLD AUTO: 1.14 THOUSANDS/ÂΜL (ref 0.6–4.47)
LYMPHOCYTES NFR BLD AUTO: 12 % (ref 14–44)
MCH RBC QN AUTO: 36.4 PG (ref 26.8–34.3)
MCHC RBC AUTO-ENTMCNC: 32.9 G/DL (ref 31.4–37.4)
MCV RBC AUTO: 111 FL (ref 82–98)
MONOCYTES # BLD AUTO: 0.75 THOUSAND/ÂΜL (ref 0.17–1.22)
MONOCYTES NFR BLD AUTO: 8 % (ref 4–12)
NEUTROPHILS # BLD AUTO: 7.22 THOUSANDS/ÂΜL (ref 1.85–7.62)
NEUTS SEG NFR BLD AUTO: 79 % (ref 43–75)
NRBC BLD AUTO-RTO: 0 /100 WBCS
PLATELET # BLD AUTO: 239 THOUSANDS/UL (ref 149–390)
PMV BLD AUTO: 9.6 FL (ref 8.9–12.7)
POTASSIUM SERPL-SCNC: 3.8 MMOL/L (ref 3.5–5.3)
PROT SERPL-MCNC: 5.1 G/DL (ref 6.4–8.4)
RBC # BLD AUTO: 2.28 MILLION/UL (ref 3.81–5.12)
SODIUM SERPL-SCNC: 142 MMOL/L (ref 135–147)
UNIT DISPENSE STATUS: NORMAL
UNIT PRODUCT CODE: NORMAL
UNIT PRODUCT VOLUME: 350 ML
UNIT RH: NORMAL
WBC # BLD AUTO: 9.26 THOUSAND/UL (ref 4.31–10.16)

## 2023-02-26 RX ADMIN — PANTOPRAZOLE SODIUM 40 MG: 40 TABLET, DELAYED RELEASE ORAL at 06:47

## 2023-02-26 RX ADMIN — FLUTICASONE FUROATE AND VILANTEROL TRIFENATATE 1 PUFF: 200; 25 POWDER RESPIRATORY (INHALATION) at 08:33

## 2023-02-26 RX ADMIN — MONTELUKAST 10 MG: 10 TABLET, FILM COATED ORAL at 21:41

## 2023-02-26 RX ADMIN — HYDROXYUREA 500 MG: 500 CAPSULE ORAL at 08:30

## 2023-02-26 RX ADMIN — POTASSIUM CHLORIDE 10 MEQ: 750 TABLET, EXTENDED RELEASE ORAL at 17:13

## 2023-02-26 RX ADMIN — POTASSIUM CHLORIDE 10 MEQ: 750 TABLET, EXTENDED RELEASE ORAL at 08:30

## 2023-02-26 RX ADMIN — FERROUS SULFATE TAB 325 MG (65 MG ELEMENTAL FE) 325 MG: 325 (65 FE) TAB at 08:30

## 2023-02-26 RX ADMIN — BUDESONIDE 0.5 MG: 0.5 INHALANT ORAL at 20:48

## 2023-02-26 RX ADMIN — AMLODIPINE BESYLATE 5 MG: 5 TABLET ORAL at 08:30

## 2023-02-26 RX ADMIN — BUDESONIDE 0.5 MG: 0.5 INHALANT ORAL at 09:08

## 2023-02-26 NOTE — PLAN OF CARE
Problem: PAIN - ADULT  Goal: Verbalizes/displays adequate comfort level or baseline comfort level  Description: Interventions:  - Encourage patient to monitor pain and request assistance  - Assess pain using appropriate pain scale  - Administer analgesics based on type and severity of pain and evaluate response  - Implement non-pharmacological measures as appropriate and evaluate response  - Consider cultural and social influences on pain and pain management  - Notify physician/advanced practitioner if interventions unsuccessful or patient reports new pain  Outcome: Progressing     Problem: INFECTION - ADULT  Goal: Absence or prevention of progression during hospitalization  Description: INTERVENTIONS:  - Assess and monitor for signs and symptoms of infection  - Monitor lab/diagnostic results  - Monitor all insertion sites, i e  indwelling lines, tubes, and drains  - Monitor endotracheal if appropriate and nasal secretions for changes in amount and color  - [place] appropriate cooling/warming therapies per order  - Administer medications as ordered  - Instruct and encourage patient and family to use good hand hygiene technique  - Identify and instruct in appropriate isolation precautions for identified infection/condition  Outcome: Progressing

## 2023-02-26 NOTE — ASSESSMENT & PLAN NOTE
· Baseline Hgb 12 6 in Jan 2023  · Hgb on admission 8 6 --> dropped to 6 9  · Acute GIB/BRBPR  · Iron panel: Iron 43, Iron sat 24, TIBC 176, Ferritin 50   · B12/folate pending  · FOBT pending  · S/P 1 unit PRBC 2/25  · Monitor H&H, hold all AC   · Bleeding appears to have resolved  Hemoglobin stable    Anticipate resuming Xarelto tomorrow per GI and monitoring for recurrent bleeding

## 2023-02-26 NOTE — ASSESSMENT & PLAN NOTE
· Suspect vasovagal as this occurred while patient was having a BM on the toilet   She had a similar episode last year  · Trauma workup in ED negative  · Trops WNL  ·   · Orthostatic vitals positive on 2/24   · Repeat orthostatic BP 2/26 negative  · PT/OT  · Echo 2/24 pending read     Suspect 2/2 orthostatic hypotension vs acute anemia/GIB

## 2023-02-26 NOTE — PROGRESS NOTES
[place]     Progress Note - Kristina Fortune 3/22/1926, 80 y o  female MRN: [id number]  Unit/Bed#: -01 Encounter: [id number]  Primary Care Provider: SLIM Lucas   Date and time admitted to hospital: 2/24/2023 12:40 PM    * Syncope  Assessment & Plan  · Suspect vasovagal as this occurred while patient was having a BM on the toilet   She had a similar episode last year  · Trauma workup in ED negative  · Trops WNL  ·   · Orthostatic vitals positive on 2/24   · Repeat orthostatic BP 2/26 negative  · PT/OT  · Echo 2/24 pending read     Suspect 2/2 orthostatic hypotension vs acute anemia/GIB     BRBPR (bright red blood per rectum)  Assessment & Plan  · Patient reported melanic stools PTA, noted to have BRPR while inpatient + acute anemia  · Hgb 8 --> 6 9  · S/P 1 unit PRBC 2/25  · GI consulted   · Xarelto is held   · Suspect this was lower bleeding probably diverticular  · Per GI consider resuming Xarelto tomorrow 2/27    [facility]  Assessment & Plan  · 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 and takes a small white pill every day for it" he claims this was discovered during and admission for pneumonia last year when WBCs were 27K  · Patient is on hydroxyurea, continue  · Follows with Dr Rajat Thompson in [place]  · Declined bone marrow biopsy due to patient age  · Continue outpatient follow up  · WBC 12 9 on admission --> resolved to 7 without abx  · CXR & UA without evidence of infection     Hypertension  Assessment & Plan  · Maintained on HCTZ and Norvasc outpatient  · Resume Norvasc at 5mg  · Hold HCTZ- will likely discontinue  · Pressures stable    COPD (chronic obstructive pulmonary disease) ([facility] )  Assessment & Plan  · Not in acute exacerbation    Anemia  Assessment & Plan  · Baseline Hgb 12 6 in Jan 2023  · Hgb on admission 8 6 --> dropped to 6 9  · Acute GIB/BRBPR  · Iron panel: Iron 43, Iron sat 24, TIBC 176, Ferritin 50 · B12/folate pending  · FOBT pending  · S/P 1 unit PRBC 2/25  · Monitor H&H, hold all AC   · Bleeding appears to have resolved  Hemoglobin stable  Anticipate resuming Xarelto tomorrow per GI and monitoring for recurrent bleeding    Large hiatal hernia  Assessment & Plan  · Continue PPI    Chronic anticoagulation  Assessment & Plan  · History of PE and now CML  · Maintained on Xarelto 20mg daily --> on hold due to acute bleeding/anemia     VTE Pharmacologic Prophylaxis: VTE Score: 9 High Risk (Score >/= 5) - Pharmacological DVT Prophylaxis Contraindicated  Sequential Compression Devices Ordered  Patient Centered Rounds: I performed bedside rounds with nursing staff today  Discussions with Specialists or Other Care Team Provider: GI    Education and Discussions with Family / Patient: Attempted to update  (son) via phone  Unable to contact  Patient reports son will be visiting this afternoon  Encouraged to reach out with questions  Total Time Spent on Date of Encounter in care of patient: 45 minutes This time was spent on one or more of the following: performing physical exam; counseling and coordination of care; obtaining or reviewing history; documenting in the medical record; reviewing/ordering tests, medications or procedures; communicating with other healthcare professionals and discussing with patient's family/caregivers  Current Length of Stay: 1 day(s)  Current Patient Status: Inpatient   Certification Statement: The patient will continue to require additional inpatient hospital stay due to Monitoring for recurrent rectal bleeding  Discharge Plan: Anticipate discharge in 24-48 hrs to discharge location to be determined pending rehab evaluations  Code Status: Level 3 - DNAR and DNI    Subjective:   Patient feels improved since admission  Reportedly ambulated with the nursing staff without dizziness or lightheadedness  No recurrent bleeding  Tolerating diet      Objective: Vitals:   Temp (24hrs), Av 5 °F (36 4 °C), Min:97 4 °F (36 3 °C), Max:97 5 °F (36 4 °C)    Temp:  [97 4 °F (36 3 °C)-97 5 °F (36 4 °C)] 97 5 °F (36 4 °C)  HR:  [72-80] 72  Resp:  [16-20] 20  BP: (115-146)/(54-70) 115/55  SpO2:  [93 %-97 %] 97 %  Body mass index is 26 33 kg/m²  Input and Output Summary (last 24 hours): Intake/Output Summary (Last 24 hours) at 2023 1244  Last data filed at 2023 0835  Gross per 24 hour   Intake 961 25 ml   Output 100 ml   Net 861 25 ml       Physical Exam:   Physical Exam  Vitals and nursing note reviewed  Constitutional:       Appearance: Normal appearance  Comments: No acute distress   HENT:      Head: Normocephalic  Eyes:      General: No scleral icterus  Extraocular Movements: Extraocular movements intact  Conjunctiva/sclera: Conjunctivae normal    Cardiovascular:      Rate and Rhythm: Normal rate and regular rhythm  Heart sounds: S1 normal and S2 normal    Pulmonary:      Effort: Pulmonary effort is normal       Breath sounds: Normal breath sounds  No wheezing, rhonchi or rales  Abdominal:      General: Bowel sounds are normal       Palpations: Abdomen is soft  Tenderness: There is no abdominal tenderness  There is no guarding or rebound  Musculoskeletal:         General: No swelling, tenderness or deformity  Cervical back: Normal range of motion  Comments: Able to move upper/lower extremities bilaterally, no edema   Skin:     General: Skin is warm and dry  Neurological:      Mental Status: She is alert and oriented to person, place, and time     Psychiatric:         Mood and Affect: Mood normal          Speech: Speech normal          Behavior: Behavior normal           Additional Data:     Labs:  Results from last 7 days   Lab Units 23  0404   WBC Thousand/uL 9 26   HEMOGLOBIN g/dL 8 3*   HEMATOCRIT % 25 2*   PLATELETS Thousands/uL 239   NEUTROS PCT % 79*   LYMPHS PCT % 12*   MONOS PCT % 8   EOS PCT % 0 Results from last 7 days   Lab Units 02/26/23  0404   SODIUM mmol/L 142   POTASSIUM mmol/L 3 8   CHLORIDE mmol/L 109*   CO2 mmol/L 29   BUN mg/dL 14   CREATININE mg/dL 0 53*   ANION GAP mmol/L 4   CALCIUM mg/dL 7 8*   ALBUMIN g/dL 3 1*   TOTAL BILIRUBIN mg/dL 0 52   ALK PHOS U/L 77   ALT U/L 10   AST U/L 14   GLUCOSE RANDOM mg/dL 92                        Lines/Drains:  Invasive Devices     Peripheral Intravenous Line  Duration           Peripheral IV 02/25/23 Dorsal (posterior); Left;Proximal Forearm 1 day                      Imaging: Reviewed radiology reports from this admission including: chest xray and CT head    Recent Cultures (last 7 days):         Last 24 Hours Medication List:   Current Facility-Administered Medications   Medication Dose Route Frequency Provider Last Rate   • amLODIPine  5 mg Oral Daily Bev JUNG PA-C     • budesonide  0 5 mg Nebulization Q12H Mae Amaya MD     • ferrous sulfate  325 mg Oral Daily With Breakfast Fany Moss PA-C     • fluticasone-vilanterol  1 puff Inhalation Daily Bev JUNG PA-C     • hydroxyurea  500 mg Oral Daily Bev JUNG PA-C     • ipratropium-albuterol  3 mL Nebulization Q6H PRN Bev JUNG PA-C     • montelukast  10 mg Oral HS Bev JUNG PA-C     • pantoprazole  40 mg Oral Daily Before Breakfast Bev JUNG PA-C     • potassium chloride  10 mEq Oral BID Bev JUNG PA-C     • sodium chloride  50 mL/hr Intravenous Continuous Fany Moss PA-C 50 mL/hr (02/25/23 2326)        Today, Patient Was Seen By: Isreal Mitchell PA-C    **Please Note: This note may have been constructed using a voice recognition system  **

## 2023-02-26 NOTE — PLAN OF CARE
Problem: Potential for Falls  Goal: Patient will remain free of falls  Description: INTERVENTIONS:  - Educate patient/family on patient safety including physical limitations  - Instruct patient to call for assistance with activity   - Consult OT/PT to assist with strengthening/mobility   - Keep Call bell within reach  - Keep bed low and locked with side rails adjusted as appropriate  - Keep care items and personal belongings within reach  - Initiate and maintain comfort rounds  - Make Fall Risk Sign visible to staff  - Offer Toileting every 2 Hours, in advance of need  - Initiate/Maintain bed alarm  - Obtain necessary fall risk management equipment: walker  - Apply yellow socks and bracelet for high fall risk patients  - Consider moving patient to room near nurses station  Outcome: Progressing     Problem: MOBILITY - ADULT  Goal: Maintain or return to baseline ADL function  Description: INTERVENTIONS:  -  Assess patient's ability to carry out ADLs; assess patient's baseline for ADL function and identify physical deficits which impact ability to perform ADLs (bathing, care of mouth/teeth, toileting, grooming, dressing, etc )  - Assess/evaluate cause of self-care deficits   - Assess range of motion  - Assess patient's mobility; develop plan if impaired  - Assess patient's need for assistive devices and provide as appropriate  - Encourage maximum independence but intervene and supervise when necessary  - Involve family in performance of ADLs  - Assess for home care needs following discharge   - Consider OT consult to assist with ADL evaluation and planning for discharge  - Provide patient education as appropriate  Outcome: Progressing  Goal: Maintains/Returns to pre admission functional level  Description: INTERVENTIONS:  - Perform BMAT or MOVE assessment daily    - Set and communicate daily mobility goal to care team and patient/family/caregiver     - Collaborate with rehabilitation services on mobility goals if consulted  - Perform Range of Motion 3 times a day  - Reposition patient every 2 hours    - Dangle patient 3 times a day  - Stand patient 3 times a day  - Ambulate patient 3 times a day  - Out of bed to chair 3 times a day for meals  - Out of bed for toileting  - Record patient progress and toleration of activity level   Outcome: Progressing     Problem: PAIN - ADULT  Goal: Verbalizes/displays adequate comfort level or baseline comfort level  Description: Interventions:  - Encourage patient to monitor pain and request assistance  - Assess pain using appropriate pain scale  - Administer analgesics based on type and severity of pain and evaluate response  - Implement non-pharmacological measures as appropriate and evaluate response  - Consider cultural and social influences on pain and pain management  - Notify physician/advanced practitioner if interventions unsuccessful or patient reports new pain  Outcome: Progressing     Problem: INFECTION - ADULT  Goal: Absence or prevention of progression during hospitalization  Description: INTERVENTIONS:  - Assess and monitor for signs and symptoms of infection  - Monitor lab/diagnostic results  - Monitor all insertion sites, i e  indwelling lines, tubes, and drains  - Monitor endotracheal if appropriate and nasal secretions for changes in amount and color  - [place] appropriate cooling/warming therapies per order  - Administer medications as ordered  - Instruct and encourage patient and family to use good hand hygiene technique  - Identify and instruct in appropriate isolation precautions for identified infection/condition  Outcome: Progressing    Problem: DISCHARGE PLANNING  Goal: Discharge to home or other facility with appropriate resources  Description: INTERVENTIONS:  - Identify barriers to discharge w/patient and caregiver  - Arrange for needed discharge resources and transportation as appropriate  - Identify discharge learning needs (meds, wound care, etc )  - Arrange for interpretive services to assist at discharge as needed  - Refer to Case Management Department for coordinating discharge planning if the patient needs post-hospital services based on physician/advanced practitioner order or complex needs related to functional status, cognitive ability, or social support system  Outcome: Progressing     Problem: Knowledge Deficit  Goal: Patient/family/caregiver demonstrates understanding of disease process, treatment plan, medications, and discharge instructions  Description: Complete learning assessment and assess knowledge base    Interventions:  - Provide teaching at level of understanding  - Provide teaching via preferred learning methods  Outcome: Progressing     Problem: Prexisting or High Potential for Compromised Skin Integrity  Goal: Skin integrity is maintained or improved  Description: INTERVENTIONS:  - Identify patients at risk for skin breakdown  - Assess and monitor skin integrity  - Assess and monitor nutrition and hydration status  - Monitor labs   - Assess for incontinence   - Turn and reposition patient  - Assist with mobility/ambulation  - Relieve pressure over bony prominences  - Avoid friction and shearing  - Provide appropriate hygiene as needed including keeping skin clean and dry  - Evaluate need for skin moisturizer/barrier cream  - Collaborate with interdisciplinary team   - Patient/family teaching  - Consider wound care consult   Outcome: Progressing     Problem: GASTROINTESTINAL - ADULT  Goal: Maintains or returns to baseline bowel function  Description: INTERVENTIONS:  - Assess bowel function  - Encourage oral fluids to ensure adequate hydration  - Administer IV fluids if ordered to ensure adequate hydration  - Administer ordered medications as needed  - Encourage mobilization and activity  - Consider nutritional services referral to assist patient with adequate nutrition and appropriate food choices  Outcome: Progressing     Problem: HEMATOLOGIC - ADULT  Goal: Maintains hematologic stability  Description: INTERVENTIONS  - Assess for signs and symptoms of bleeding or hemorrhage  - Monitor labs  - Administer supportive blood products/factors as ordered and appropriate  Outcome: Progressing

## 2023-02-26 NOTE — ASSESSMENT & PLAN NOTE
· Patient reported melanic stools PTA, noted to have BRPR while inpatient + acute anemia  · Hgb 8 --> 6 9  · S/P 1 unit PRBC 2/25  · GI consulted   · Xarelto is held   · Suspect this was lower bleeding probably diverticular  · Per GI consider resuming Xarelto tomorrow 2/27

## 2023-02-26 NOTE — ASSESSMENT & PLAN NOTE
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

## 2023-02-26 NOTE — PROGRESS NOTES
Progress note - [address] [facility]   Callie Matthew 80 y o  female MRN: [id number]  Unit/Bed#: -Haim Encounter: [id number]    ASSESSMENT and PLAN    -80year-old female admitted with syncope  Noted to have bright red blood per rectum and anemia  On anticoagulation  No localizing symptoms  Suspect this was lower bleeding probably diverticular recommend transfusion and holding the anticoagulation and observation  If she stops bleeding I would not recommend endoscopic investigation given her age and comorbidities  She takes anticoagulation for pulmonary embolism  Okay to restart tomorrow  If she has further bleeding need to discuss IVC filter  Chief Complaint   Patient presents with   • Fall     Pt to er after falling this am after using the restroom and passing out after a bowel movement       SUBJECTIVE/HPI   No further rectal bleeding    Comfortable sitting in the chair    /55   Pulse 72   Temp 97 5 °F (36 4 °C)   Resp 20   Ht 4' 10" (1 473 m)   Wt 57 2 kg (126 lb)   SpO2 97%   BMI 26 33 kg/m²     PHYSICALEXAM  General appearance: alert, appears stated age and cooperative  Eyes: PERLLA, EOMI, no icterus   Head: Normocephalic, without obvious abnormality, atraumatic  Extremities: extremities normal, atraumatic, no cyanosis or edema  Neurologic: Grossly normal    Lab Results   Component Value Date    GLUCOSE 90 03/16/2015    CALCIUM 7 8 (L) 02/26/2023     03/16/2015    K 3 8 02/26/2023    CO2 29 02/26/2023     (H) 02/26/2023    BUN 14 02/26/2023    CREATININE 0 53 (L) 02/26/2023     Lab Results   Component Value Date    WBC 9 26 02/26/2023    HGB 8 3 (L) 02/26/2023    HCT 25 2 (L) 02/26/2023     (H) 02/26/2023     02/26/2023     Lab Results   Component Value Date    ALT 10 02/26/2023    AST 14 02/26/2023    ALKPHOS 77 02/26/2023    BILITOT 0 3 03/16/2015     No results found for: AMYLASE  No results found for: LIPASE  Lab Results   Component Value Date    IRON 43 (L) 02/25/2023    TIBC 176 (L) 02/25/2023    FERRITIN 50 02/25/2023     No results found for: INR

## 2023-02-27 LAB
ALBUMIN SERPL BCP-MCNC: 3 G/DL (ref 3.5–5)
ALP SERPL-CCNC: 72 U/L (ref 34–104)
ALT SERPL W P-5'-P-CCNC: 9 U/L (ref 7–52)
ANION GAP SERPL CALCULATED.3IONS-SCNC: 4 MMOL/L (ref 4–13)
AORTIC ROOT: 3.3 CM
AORTIC VALVE MEAN VELOCITY: 11.4 M/S
APICAL FOUR CHAMBER EJECTION FRACTION: 75 %
ASCENDING AORTA: 3.3 CM
AST SERPL W P-5'-P-CCNC: 12 U/L (ref 13–39)
AV AREA BY CONTINUOUS VTI: 1.9 CM2
AV AREA PEAK VELOCITY: 1.6 CM2
AV LVOT MEAN GRADIENT: 3 MMHG
AV LVOT PEAK GRADIENT: 4 MMHG
AV MEAN GRADIENT: 6 MMHG
AV PEAK GRADIENT: 12 MMHG
AV VALVE AREA: 1.87 CM2
AV VELOCITY RATIO: 0.57
BASOPHILS # BLD AUTO: 0.03 THOUSANDS/ÂΜL (ref 0–0.1)
BASOPHILS NFR BLD AUTO: 0 % (ref 0–1)
BILIRUB SERPL-MCNC: 0.4 MG/DL (ref 0.2–1)
BUN SERPL-MCNC: 16 MG/DL (ref 5–25)
CALCIUM ALBUM COR SERPL-MCNC: 9.1 MG/DL (ref 8.3–10.1)
CALCIUM SERPL-MCNC: 8.3 MG/DL (ref 8.4–10.2)
CHLORIDE SERPL-SCNC: 109 MMOL/L (ref 96–108)
CO2 SERPL-SCNC: 28 MMOL/L (ref 21–32)
CREAT SERPL-MCNC: 0.55 MG/DL (ref 0.6–1.3)
DOP CALC AO PEAK VEL: 1.77 M/S
DOP CALC AO VTI: 33.77 CM
DOP CALC LVOT AREA: 2.83 CM2
DOP CALC LVOT DIAMETER: 1.9 CM
DOP CALC LVOT PEAK VEL VTI: 22.33 CM
DOP CALC LVOT PEAK VEL: 1.01 M/S
DOP CALC LVOT STROKE INDEX: 41.3 ML/M2
DOP CALC LVOT STROKE VOLUME: 63.28
DOP CALC MV VTI: 34.48 CM
E WAVE DECELERATION TIME: 180 MS
EOSINOPHIL # BLD AUTO: 0.03 THOUSAND/ÂΜL (ref 0–0.61)
EOSINOPHIL NFR BLD AUTO: 0 % (ref 0–6)
ERYTHROCYTE [DISTWIDTH] IN BLOOD BY AUTOMATED COUNT: 17.6 % (ref 11.6–15.1)
FRACTIONAL SHORTENING: 38 (ref 28–44)
GFR SERPL CREATININE-BSD FRML MDRD: 79 ML/MIN/1.73SQ M
GLUCOSE SERPL-MCNC: 84 MG/DL (ref 65–140)
HCT VFR BLD AUTO: 24.2 % (ref 34.8–46.1)
HGB BLD-MCNC: 7.8 G/DL (ref 11.5–15.4)
IMM GRANULOCYTES # BLD AUTO: 0.1 THOUSAND/UL (ref 0–0.2)
IMM GRANULOCYTES NFR BLD AUTO: 1 % (ref 0–2)
INTERVENTRICULAR SEPTUM IN DIASTOLE (PARASTERNAL SHORT AXIS VIEW): 0.9 CM
INTERVENTRICULAR SEPTUM: 0.9 CM (ref 0.6–1.1)
LAAS-AP4: 18.2 CM2
LEFT ATRIUM SIZE: 2.4 CM
LEFT INTERNAL DIMENSION IN SYSTOLE: 2.4 CM (ref 2.1–4)
LEFT VENTRICLE DIASTOLIC VOLUME (MOD BIPLANE): 66 ML
LEFT VENTRICLE SYSTOLIC VOLUME (MOD BIPLANE): 16 ML
LEFT VENTRICULAR INTERNAL DIMENSION IN DIASTOLE: 3.9 CM (ref 3.5–6)
LEFT VENTRICULAR POSTERIOR WALL IN END DIASTOLE: 0.9 CM
LEFT VENTRICULAR STROKE VOLUME: 45 ML
LV EF: 76 %
LVSV (TEICH): 45 ML
LYMPHOCYTES # BLD AUTO: 0.88 THOUSANDS/ÂΜL (ref 0.6–4.47)
LYMPHOCYTES NFR BLD AUTO: 12 % (ref 14–44)
MCH RBC QN AUTO: 36.1 PG (ref 26.8–34.3)
MCHC RBC AUTO-ENTMCNC: 32.2 G/DL (ref 31.4–37.4)
MCV RBC AUTO: 112 FL (ref 82–98)
MONOCYTES # BLD AUTO: 0.63 THOUSAND/ÂΜL (ref 0.17–1.22)
MONOCYTES NFR BLD AUTO: 9 % (ref 4–12)
MV E'TISSUE VEL-LAT: 5 CM/S
MV E'TISSUE VEL-SEP: 4 CM/S
MV MEAN GRADIENT: 4 MMHG
MV PEAK A VEL: 1.65 M/S
MV PEAK E VEL: 98 CM/S
MV PEAK GRADIENT: 13 MMHG
MV STENOSIS PRESSURE HALF TIME: 52 MS
MV VALVE AREA BY CONTINUITY EQUATION: 1.84 CM2
MV VALVE AREA P 1/2 METHOD: 4.23
NEUTROPHILS # BLD AUTO: 5.59 THOUSANDS/ÂΜL (ref 1.85–7.62)
NEUTS SEG NFR BLD AUTO: 78 % (ref 43–75)
NRBC BLD AUTO-RTO: 0 /100 WBCS
PLATELET # BLD AUTO: 233 THOUSANDS/UL (ref 149–390)
PMV BLD AUTO: 9.3 FL (ref 8.9–12.7)
POTASSIUM SERPL-SCNC: 4 MMOL/L (ref 3.5–5.3)
PROT SERPL-MCNC: 5 G/DL (ref 6.4–8.4)
RA PRESSURE ESTIMATED: 3 MMHG
RBC # BLD AUTO: 2.16 MILLION/UL (ref 3.81–5.12)
RIGHT ATRIAL 2D VOLUME: 31 ML
RIGHT ATRIUM AREA SYSTOLE A4C: 12.9 CM2
RIGHT VENTRICLE ID DIMENSION: 3.5 CM
RV PSP: 24 MMHG
SL CV ECHO LV DYNAMIC OBSTRUCTION PEAK GRADIENT (REST): 17 MMHG
SL CV LV EF: 65
SL CV PED ECHO LEFT VENTRICLE DIASTOLIC VOLUME (MOD BIPLANE) 2D: 65 ML
SL CV PED ECHO LEFT VENTRICLE SYSTOLIC VOLUME (MOD BIPLANE) 2D: 20 ML
SODIUM SERPL-SCNC: 141 MMOL/L (ref 135–147)
TR MAX PG: 21 MMHG
TR PEAK VELOCITY: 2.3 M/S
TRICUSPID ANNULAR PLANE SYSTOLIC EXCURSION: 1.7 CM
TRICUSPID VALVE PEAK REGURGITATION VELOCITY: 2.26 M/S
WBC # BLD AUTO: 7.26 THOUSAND/UL (ref 4.31–10.16)

## 2023-02-27 RX ADMIN — BUDESONIDE 0.5 MG: 0.5 INHALANT ORAL at 07:42

## 2023-02-27 RX ADMIN — HYDROXYUREA 500 MG: 500 CAPSULE ORAL at 08:16

## 2023-02-27 RX ADMIN — BUDESONIDE 0.5 MG: 0.5 INHALANT ORAL at 21:19

## 2023-02-27 RX ADMIN — PANTOPRAZOLE SODIUM 40 MG: 40 TABLET, DELAYED RELEASE ORAL at 06:57

## 2023-02-27 RX ADMIN — FERROUS SULFATE TAB 325 MG (65 MG ELEMENTAL FE) 325 MG: 325 (65 FE) TAB at 06:57

## 2023-02-27 RX ADMIN — MONTELUKAST 10 MG: 10 TABLET, FILM COATED ORAL at 23:00

## 2023-02-27 RX ADMIN — AMLODIPINE BESYLATE 5 MG: 5 TABLET ORAL at 08:12

## 2023-02-27 RX ADMIN — POTASSIUM CHLORIDE 10 MEQ: 750 TABLET, EXTENDED RELEASE ORAL at 08:12

## 2023-02-27 RX ADMIN — IPRATROPIUM BROMIDE AND ALBUTEROL SULFATE 3 ML: 2.5; .5 SOLUTION RESPIRATORY (INHALATION) at 21:19

## 2023-02-27 RX ADMIN — POTASSIUM CHLORIDE 10 MEQ: 750 TABLET, EXTENDED RELEASE ORAL at 17:07

## 2023-02-27 RX ADMIN — FLUTICASONE FUROATE AND VILANTEROL TRIFENATATE 1 PUFF: 200; 25 POWDER RESPIRATORY (INHALATION) at 08:17

## 2023-02-27 RX ADMIN — RIVAROXABAN 20 MG: 20 TABLET, FILM COATED ORAL at 11:45

## 2023-02-27 NOTE — ASSESSMENT & PLAN NOTE
· Suspect vasovagal as this occurred while patient was having a BM on the toilet   She had a similar episode last year  · Trauma workup in ED negative  · Trops WNL  ·   · Orthostatic vitals positive on 2/24   · Repeat orthostatic BP 2/26 negative  · PT/OT  · Echo: completed but still not read as of 2/27, TT sent to cardiology    Suspect 2/2 orthostatic hypotension vs acute anemia/GIB

## 2023-02-27 NOTE — ASSESSMENT & PLAN NOTE
· Baseline Hgb 12 6 in Jan 2023  · Hgb on admission 8 6 --> dropped to 6 9  · Acute GIB/BRBPR  · Iron panel: Iron 43, Iron sat 24, TIBC 176, Ferritin 50   · S/P 1 unit PRBC 2/25  · Monitor H&H, Xarelto initially held  · Bleeding appears to have resolved    Discussed with gastroenterology, will resume Xarelto today and monitor

## 2023-02-27 NOTE — ASSESSMENT & PLAN NOTE
· 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 and takes a small white pill every day for it" he claims this was discovered during and admission for pneumonia last year when WBCs were 27K  · Patient is on hydroxyurea, continue  · Follows with Dr Laure Hughes in [place]  · Declined bone marrow biopsy due to patient age  · Continue outpatient follow up  · WBC 12 9 on admission --> resolved to 7 without abx  · CXR & UA without evidence of infection

## 2023-02-27 NOTE — CASE MANAGEMENT
Case Management Assessment & Discharge Planning Note    Patient name Danette Valdez  Location /-80 MRN [id number]  : 3/22/1926 Date 2023       Current Admission Date: 2023  Current Admission Diagnosis:Syncope   Patient Active Problem List    Diagnosis Date Noted   • Anemia 2023   • Pulmonary emboli ([address] ) 2023   • Essential thrombocythemia ([facility] ) 2023   • CML (chronic myelocytic leukemia) ([facility] [facility] ) 2023   • Syncope 2023   • BRBPR (bright red blood per rectum) 2023   • COPD (chronic obstructive pulmonary disease) ([facility] [address] [facility] ) 2023   • Large hiatal hernia 2020   • Chronic GERD 04/15/2019   • Dysphagia 2019   • Gastroesophageal reflux disease 2019   • Family history of colonic polyps 2019   • Chronic anticoagulation 2019   • Hypertension 2017      LOS (days): 2  Geometric Mean LOS (GMLOS) (days):   Days to GMLOS:     OBJECTIVE:    Risk of Unplanned Readmission Score: 18 1         Current admission status: Inpatient  Referral Reason: Other (post acute needs)    Preferred Pharmacy:   [address] [place] [address] Betancourt SAVANNAH Wheeler [address] [place] [id number]  Phone: [phone] Fax: [phone]    Primary Care Provider: SLIM Powers    Primary Insurance: [facility]  Secondary Insurance:     ASSESSMENT:  Active Health Care Proxies     VelCarrie ahmadi Harish Lyman - Jose Alejandro   Primary Phone: [phone] (Home)               Advance Directives  Does patient have a [facility]?: Yes  Does patient have Advance Directives?: Yes  Advance Directives: Living will, Power of  for health care  Primary Contact: Woodrow Cuevas         Readmission Root Cause  30 Day Readmission: No    Patient Information  Admitted from[de-identified] Home  Mental Status: Alert  During Assessment patient was accompanied by: Not accompanied during assessment  Assessment information provided by[de-identified] Patient  Primary Caregiver: Self  Support Systems: Self, Son  Home entry access options   Select all that apply : Stairs  Number of steps to enter home : 3  Do the steps have railings?: No  Type of Current Residence: Ranch  In the last 12 months, was there a time when you were not able to pay the mortgage or rent on time?: No  In the last 12 months, how many places have you lived?: 1  In the last 12 months, was there a time when you did not have a steady place to sleep or slept in a shelter (including now)?: No  Homeless/housing insecurity resource given?: N/A  Living Arrangements: Lives w/ Son    Activities of Daily Living Prior to Admission  Functional Status: Independent  Completes ADLs independently?: Yes  Ambulates independently?: Yes  Does patient use assisted devices?: Yes  Assisted Devices (DME) used: Straight Cane  Does patient currently own DME?: Yes  What DME does the patient currently own?: Yaneth Martinez  Does patient have a history of Outpatient Therapy (PT/OT)?: No  Does the patient have a history of Short-Term Rehab?: Yes  Does patient have a history of HHC?: Yes  Does patient currently have [facility]?: No         Patient Information Continued  Income Source: Pension/alf  Does patient have prescription coverage?: Yes  Within the past 12 months, you worried that your food would run out before you got the money to buy more : Never true  Within the past 12 months, the food you bought just didn't last and you didn't have money to get more : Never true  Food insecurity resource given?: N/A  Does patient receive dialysis treatments?: No  Does patient have a history of substance abuse?: No  Does patient have a history of Mental Health Diagnosis?: No         Means of Transportation  Means of Transport to Appts[de-identified] Family transport  In the past 12 months, has lack of transportation kept you from medical appointments or from getting medications?: No  In the past 12 months, has lack of transportation kept you from meetings, work, or from getting things needed for daily living?: No  Was application for public transport provided?: N/A        DISCHARGE DETAILS:    Discharge planning discussed with[de-identified] Patient at bedside  Freedom of Choice: Yes  Comments - Freedom of Choice: discussed DC planning and role of CM  CM contacted family/caregiver?: No- see comments (Pt alert and indpt in room)  Were Treatment Team discharge recommendations reviewed with patient/caregiver?: Yes  Did patient/caregiver verbalize understanding of patient care needs?: Yes       Contacts  Reason/Outcome: Discharge [address]         Is the patient interested in [address] at discharge?: No    DME Referral Provided  Referral made for DME?: No    Other Referral/Resources/Interventions Provided:  Interventions: None Indicated  Referral Comments: No needs anticipated at this time   Pending PT/OT recs            Discharge Destination Plan[de-identified] Home  Transport at Discharge : Family                                      Additional Comments: Cm met with pt at bedside  Pt reports that she lives in a ranch style home with 3 vinh  Pts son also resides in the home  Pt has a cane and walker at home  She states she uses the cane primarily  Pt has had [address] before but does not recall the agency  Pt has been to [employer] [facility]  She has no hx of PT/OT/MH/DA   Pts son is her POA  Pt/Ot to see pt for recommendations

## 2023-02-27 NOTE — PROGRESS NOTES
[place]     Progress Note - Brian Neff 3/22/1926, 80 y o  female MRN: [id number]  Unit/Bed#: -01 Encounter: [id number]  Primary Care Provider: SLIM Cronin   Date and time admitted to hospital: 2/24/2023 12:40 PM    * Syncope  Assessment & Plan  · Suspect vasovagal as this occurred while patient was having a BM on the toilet  She had a similar episode last year  · Trauma workup in ED negative  · Trops WNL  ·   · Orthostatic vitals positive on 2/24   · Repeat orthostatic BP 2/26 negative  · PT/OT  · Echo: completed but still not read as of 2/27, TT sent to cardiology    Suspect 2/2 orthostatic hypotension vs acute anemia/GIB     BRBPR (bright red blood per rectum)  Assessment & Plan  · Patient reported melanic stools PTA, noted to have BRPR while inpatient + acute anemia  · Hgb 8 --> 6 9  · S/P 1 unit PRBC 2/25  · GI consulted   · Xarelto initially held  · Suspect this was lower bleeding probably diverticular  · No further bleeding    Xarelto resumed today 2/27    CML (chronic myelocytic leukemia) ([facility] )  Assessment & Plan  · 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 and takes a small white pill every day for it" he claims this was discovered during and admission for pneumonia last year when WBCs were 27K  · Patient is on hydroxyurea, continue  · Follows with Dr Pee Baldwin in [place]  · Declined bone marrow biopsy due to patient age  · Continue outpatient follow up  · WBC 12 9 on admission --> resolved to 7 without abx  · CXR & UA without evidence of infection     Hypertension  Assessment & Plan  · Maintained on HCTZ and Norvasc outpatient  · Resume Norvasc at 5mg  · Hold HCTZ- will likely discontinue  · Pressures stable    COPD (chronic obstructive pulmonary disease) ([facility] [address] )  Assessment & Plan  · Not in acute exacerbation    Anemia  Assessment & Plan  · Baseline Hgb 12 6 in Jan 2023  · Hgb on admission 8 6 --> dropped to 6 9  · Acute GIB/BRBPR  · Iron panel: Iron 43, Iron sat 24, TIBC 176, Ferritin 50   · S/P 1 unit PRBC   · Monitor H&H, Xarelto initially held  · Bleeding appears to have resolved  Discussed with gastroenterology, will resume Xarelto today and monitor    Large hiatal hernia  Assessment & Plan  · Continue PPI    Chronic anticoagulation  Assessment & Plan  · History of PE and now CML  · Maintained on Xarelto 20mg daily --> restarted     VTE Pharmacologic Prophylaxis: VTE Score: 9 High Risk (Score >/= 5) - Pharmacological DVT Prophylaxis Ordered: rivaroxaban (Xarelto)  Sequential Compression Devices Ordered  Patient Centered Rounds: I performed bedside rounds with nursing staff today  Discussions with Specialists or Other Care Team Provider: CM, GI    Education and Discussions with Family / Patient: Patient declined call to   Total Time Spent on Date of Encounter in care of patient: 45 minutes This time was spent on one or more of the following: performing physical exam; counseling and coordination of care; obtaining or reviewing history; documenting in the medical record; reviewing/ordering tests, medications or procedures; communicating with other healthcare professionals and discussing with patient's family/caregivers  Current Length of Stay: 2 day(s)  Current Patient Status: Inpatient   Certification Statement: The patient will continue to require additional inpatient hospital stay due to Monitoring hemoglobin after resuming Xarelto  Discharge Plan: Anticipate discharge tomorrow to home  Code Status: Level 3 - DNAR and DNI    Subjective:   Patient states that she feels well  Denies chest pain/palpitations, shortness of breath, nausea/vomiting, abdominal pain      Objective:     Vitals:   Temp (24hrs), Av 7 °F (36 5 °C), Min:97 6 °F (36 4 °C), Max:97 7 °F (36 5 °C)    Temp:  [97 6 °F (36 4 °C)-97 7 °F (36 5 °C)] 97 7 °F (36 5 °C)  HR:  [74-77] 74  Resp:  [16-19] 16  BP: (115-145)/(50-79) 136/57  SpO2:  [90 %-96 %] 95 %  Body mass index is 26 33 kg/m²  Input and Output Summary (last 24 hours): Intake/Output Summary (Last 24 hours) at 2/27/2023 1202  Last data filed at 2/27/2023 0701  Gross per 24 hour   Intake 240 ml   Output 200 ml   Net 40 ml       Physical Exam:   Physical Exam  Vitals and nursing note reviewed  Constitutional:       Appearance: Normal appearance  Comments: No acute distress   HENT:      Head: Normocephalic  Eyes:      General: No scleral icterus  Extraocular Movements: Extraocular movements intact  Conjunctiva/sclera: Conjunctivae normal    Cardiovascular:      Rate and Rhythm: Normal rate and regular rhythm  Heart sounds: S1 normal and S2 normal    Pulmonary:      Effort: Pulmonary effort is normal       Breath sounds: Normal breath sounds  No wheezing, rhonchi or rales  Abdominal:      General: Bowel sounds are normal       Palpations: Abdomen is soft  Tenderness: There is no abdominal tenderness  There is no guarding or rebound  Musculoskeletal:         General: No swelling, tenderness or deformity  Cervical back: Normal range of motion  Comments: Able to move upper/lower extremities bilaterally, no edema   Skin:     General: Skin is warm and dry  Neurological:      Mental Status: She is alert and oriented to person, place, and time     Psychiatric:         Mood and Affect: Mood normal          Speech: Speech normal          Behavior: Behavior normal           Additional Data:     Labs:  Results from last 7 days   Lab Units 02/27/23  0448   WBC Thousand/uL 7 26   HEMOGLOBIN g/dL 7 8*   HEMATOCRIT % 24 2*   PLATELETS Thousands/uL 233   NEUTROS PCT % 78*   LYMPHS PCT % 12*   MONOS PCT % 9   EOS PCT % 0     Results from last 7 days   Lab Units 02/27/23  0448   SODIUM mmol/L 141   POTASSIUM mmol/L 4 0   CHLORIDE mmol/L 109*   CO2 mmol/L 28   BUN mg/dL 16   CREATININE mg/dL 0 55*   ANION GAP mmol/L 4   CALCIUM mg/dL 8 3*   ALBUMIN g/dL 3 0*   TOTAL BILIRUBIN mg/dL 0 40   ALK PHOS U/L 72   ALT U/L 9   AST U/L 12*   GLUCOSE RANDOM mg/dL 84                       Lines/Drains:  Invasive Devices     Peripheral Intravenous Line  Duration           Peripheral IV 02/25/23 Dorsal (posterior); Left;Proximal Forearm 2 days                      Imaging: No pertinent imaging reviewed  Recent Cultures (last 7 days):         Last 24 Hours Medication List:   Current Facility-Administered Medications   Medication Dose Route Frequency Provider Last Rate   • amLODIPine  5 mg Oral Daily Bev JUNG PA-C     • budesonide  0 5 mg Nebulization Q12H Mae Blanchard MD     • ferrous sulfate  325 mg Oral Daily With Breakfast Toy Moss PA-C     • fluticasone-vilanterol  1 puff Inhalation Daily Bev JUNG PA-C     • hydroxyurea  500 mg Oral Daily Bev JUNG PA-C     • ipratropium-albuterol  3 mL Nebulization Q6H PRN Bev JUNG PA-C     • montelukast  10 mg Oral HS Bev JUNG PA-C     • pantoprazole  40 mg Oral Daily Before Breakfast Bev JUNG PA-C     • potassium chloride  10 mEq Oral BID Anabella JUNG PA-C     • rivaroxaban  20 mg Oral Daily With Breakfast Phoebe Pimentel PA-C          Today, Patient Was Seen By: Phoebe Pimentel PA-C    **Please Note: This note may have been constructed using a voice recognition system  **

## 2023-02-27 NOTE — PLAN OF CARE
Problem: Potential for Falls  Goal: Patient will remain free of falls  Description: INTERVENTIONS:  - Educate patient/family on patient safety including physical limitations  - Instruct patient to call for assistance with activity   - Consult OT/PT to assist with strengthening/mobility   - Keep Call bell within reach  - Keep bed low and locked with side rails adjusted as appropriate  - Keep care items and personal belongings within reach  - Initiate and maintain comfort rounds  - Make Fall Risk Sign visible to staff  - Offer Toileting every  Hours, in advance of need  - Initiate/Maintain alarm  - Obtain necessary fall risk management equipment:  - Apply yellow socks and bracelet for high fall risk patients  - Consider moving patient to room near nurses station  Outcome: Progressing     Problem: MOBILITY - ADULT  Goal: Maintain or return to baseline ADL function  Description: INTERVENTIONS:  -  Assess patient's ability to carry out ADLs; assess patient's baseline for ADL function and identify physical deficits which impact ability to perform ADLs (bathing, care of mouth/teeth, toileting, grooming, dressing, etc )  - Assess/evaluate cause of self-care deficits   - Assess range of motion  - Assess patient's mobility; develop plan if impaired  - Assess patient's need for assistive devices and provide as appropriate  - Encourage maximum independence but intervene and supervise when necessary  - Involve family in performance of ADLs  - Assess for home care needs following discharge   - Consider OT consult to assist with ADL evaluation and planning for discharge  - Provide patient education as appropriate  Outcome: Progressing     Problem: Prexisting or High Potential for Compromised Skin Integrity  Goal: Skin integrity is maintained or improved  Description: INTERVENTIONS:  - Identify patients at risk for skin breakdown  - Assess and monitor skin integrity  - Assess and monitor nutrition and hydration status  - Monitor labs   - Assess for incontinence   - Turn and reposition patient  - Assist with mobility/ambulation  - Relieve pressure over bony prominences  - Avoid friction and shearing  - Provide appropriate hygiene as needed including keeping skin clean and dry  - Evaluate need for skin moisturizer/barrier cream  - Collaborate with interdisciplinary team   - Patient/family teaching  - Consider wound care consult   Outcome: Progressing

## 2023-02-27 NOTE — PLAN OF CARE
Problem: PAIN - ADULT  Goal: Verbalizes/displays adequate comfort level or baseline comfort level  Description: Interventions:  - Encourage patient to monitor pain and request assistance  - Assess pain using appropriate pain scale  - Administer analgesics based on type and severity of pain and evaluate response  - Implement non-pharmacological measures as appropriate and evaluate response  - Consider cultural and social influences on pain and pain management  - Notify physician/advanced practitioner if interventions unsuccessful or patient reports new pain  Outcome: Progressing     Problem: INFECTION - ADULT  Goal: Absence or prevention of progression during hospitalization  Description: INTERVENTIONS:  - Assess and monitor for signs and symptoms of infection  - Monitor lab/diagnostic results  - Monitor all insertion sites, i e  indwelling lines, tubes, and drains  - Monitor endotracheal if appropriate and nasal secretions for changes in amount and color  - [place] appropriate cooling/warming therapies per order  - Administer medications as ordered  - Instruct and encourage patient and family to use good hand hygiene technique  - Identify and instruct in appropriate isolation precautions for identified infection/condition  Outcome: Progressing

## 2023-02-27 NOTE — PROGRESS NOTES
Progress note - Gastroenterology   Aries Angel 80 y o  female MRN: [id number]  Unit/Bed#: -01 Encounter: [id number]    ASSESSMENT and PLAN  80year-old female admitted with syncopal episode  Noted to be anemic, bright red blood per rectum  On Xarelto for history of pulmonary emboli, history of CML  Suspect diverticular bleeding  Required 1 unit PRBCs    1  Anemia  2  Rectal bleeding  Nursing reports maroon-colored stool yesterday but none further x24 hours  Hemoglobin 7 8 from 8 3  Suspect diverticular bleed in setting of anticoagulation  -Okay to resume Xarelto today  -Continue to trend H&H and monitor for signs of bleeding  -Okay for regular diet  -No plans for endoscopic evaluation unless she develops active bleeding     Chief Complaint   Patient presents with   • Fall     Pt to er after falling this am after using the restroom and passing out after a bowel movement       SUBJECTIVE/HPI   No further rectal bleeding or melena since yesterday  Hemoglobin 7 8 this a m , from 8 3 yesterday  Tolerating regular diet well, no nausea or vomiting, no abdominal pain    /57   Pulse 74   Temp 97 7 °F (36 5 °C)   Resp 16   Ht 4' 10" (1 473 m)   Wt 57 2 kg (126 lb)   SpO2 95%   BMI 26 33 kg/m²     PHYSICALEXAM  General appearance: alert, appears stated age and cooperative  Eyes :no icterus  Head: Normocephalic, without obvious abnormality, atraumatic  Lungs: clear to auscultation bilaterally  Heart: regular rate and rhythm, S1, S2 normal, no murmur, click, rub or gallop  Abdomen: soft, nondistended, nontender  Nursing reports maroon-colored stool yesterday but no melena or rectal bleeding overnight or this a m    Extremities: extremities normal, atraumatic, no cyanosis or edema  Neurologic: Grossly normal    Lab Results   Component Value Date    GLUCOSE 90 03/16/2015    CALCIUM 8 3 (L) 02/27/2023     03/16/2015    K 4 0 02/27/2023    CO2 28 02/27/2023     (H) 02/27/2023    BUN 16 02/27/2023    CREATININE 0 55 (L) 02/27/2023     Lab Results   Component Value Date    WBC 7 26 02/27/2023    HGB 7 8 (L) 02/27/2023    HCT 24 2 (L) 02/27/2023     (H) 02/27/2023     02/27/2023     Lab Results   Component Value Date    ALT 9 02/27/2023    AST 12 (L) 02/27/2023    ALKPHOS 72 02/27/2023    BILITOT 0 3 03/16/2015       Lab Results   Component Value Date    IRON 43 (L) 02/25/2023    TIBC 176 (L) 02/25/2023    FERRITIN 50 02/25/2023

## 2023-02-27 NOTE — ASSESSMENT & PLAN NOTE
· Patient reported melanic stools PTA, noted to have BRPR while inpatient + acute anemia  · Hgb 8 --> 6 9  · S/P 1 unit PRBC 2/25  · GI consulted   · Xarelto initially held  · Suspect this was lower bleeding probably diverticular  · No further bleeding    Xarelto resumed today 2/27

## 2023-02-28 VITALS
HEART RATE: 83 BPM | OXYGEN SATURATION: 97 % | BODY MASS INDEX: 26.45 KG/M2 | WEIGHT: 126 LBS | RESPIRATION RATE: 18 BRPM | SYSTOLIC BLOOD PRESSURE: 124 MMHG | TEMPERATURE: 97.4 F | DIASTOLIC BLOOD PRESSURE: 44 MMHG | HEIGHT: 58 IN

## 2023-02-28 LAB
ALBUMIN SERPL BCP-MCNC: 3 G/DL (ref 3.5–5)
ALP SERPL-CCNC: 68 U/L (ref 34–104)
ALT SERPL W P-5'-P-CCNC: 9 U/L (ref 7–52)
ANION GAP SERPL CALCULATED.3IONS-SCNC: 4 MMOL/L (ref 4–13)
AST SERPL W P-5'-P-CCNC: 11 U/L (ref 13–39)
ATRIAL RATE: 73 BPM
BASOPHILS # BLD AUTO: 0.03 THOUSANDS/ÂΜL (ref 0–0.1)
BASOPHILS NFR BLD AUTO: 1 % (ref 0–1)
BILIRUB SERPL-MCNC: 0.37 MG/DL (ref 0.2–1)
BUN SERPL-MCNC: 16 MG/DL (ref 5–25)
CALCIUM ALBUM COR SERPL-MCNC: 9.3 MG/DL (ref 8.3–10.1)
CALCIUM SERPL-MCNC: 8.5 MG/DL (ref 8.4–10.2)
CHLORIDE SERPL-SCNC: 108 MMOL/L (ref 96–108)
CO2 SERPL-SCNC: 28 MMOL/L (ref 21–32)
CREAT SERPL-MCNC: 0.59 MG/DL (ref 0.6–1.3)
EOSINOPHIL # BLD AUTO: 0.01 THOUSAND/ÂΜL (ref 0–0.61)
EOSINOPHIL NFR BLD AUTO: 0 % (ref 0–6)
ERYTHROCYTE [DISTWIDTH] IN BLOOD BY AUTOMATED COUNT: 17.2 % (ref 11.6–15.1)
GFR SERPL CREATININE-BSD FRML MDRD: 77 ML/MIN/1.73SQ M
GLUCOSE SERPL-MCNC: 93 MG/DL (ref 65–140)
HCT VFR BLD AUTO: 24.6 % (ref 34.8–46.1)
HGB BLD-MCNC: 7.8 G/DL (ref 11.5–15.4)
IMM GRANULOCYTES # BLD AUTO: 0.1 THOUSAND/UL (ref 0–0.2)
IMM GRANULOCYTES NFR BLD AUTO: 2 % (ref 0–2)
LYMPHOCYTES # BLD AUTO: 0.91 THOUSANDS/ÂΜL (ref 0.6–4.47)
LYMPHOCYTES NFR BLD AUTO: 14 % (ref 14–44)
MCH RBC QN AUTO: 36.1 PG (ref 26.8–34.3)
MCHC RBC AUTO-ENTMCNC: 31.7 G/DL (ref 31.4–37.4)
MCV RBC AUTO: 114 FL (ref 82–98)
MONOCYTES # BLD AUTO: 0.62 THOUSAND/ÂΜL (ref 0.17–1.22)
MONOCYTES NFR BLD AUTO: 9 % (ref 4–12)
NEUTROPHILS # BLD AUTO: 4.94 THOUSANDS/ÂΜL (ref 1.85–7.62)
NEUTS SEG NFR BLD AUTO: 74 % (ref 43–75)
NRBC BLD AUTO-RTO: 0 /100 WBCS
P AXIS: 93 DEGREES
PLATELET # BLD AUTO: 218 THOUSANDS/UL (ref 149–390)
PMV BLD AUTO: 9.3 FL (ref 8.9–12.7)
POTASSIUM SERPL-SCNC: 4.2 MMOL/L (ref 3.5–5.3)
PR INTERVAL: 186 MS
PROT SERPL-MCNC: 5.3 G/DL (ref 6.4–8.4)
QRS AXIS: 129 DEGREES
QRSD INTERVAL: 88 MS
QT INTERVAL: 440 MS
QTC INTERVAL: 484 MS
RBC # BLD AUTO: 2.16 MILLION/UL (ref 3.81–5.12)
SODIUM SERPL-SCNC: 140 MMOL/L (ref 135–147)
T WAVE AXIS: 107 DEGREES
VENTRICULAR RATE: 73 BPM
WBC # BLD AUTO: 6.61 THOUSAND/UL (ref 4.31–10.16)

## 2023-02-28 RX ORDER — IPRATROPIUM BROMIDE AND ALBUTEROL SULFATE 2.5; .5 MG/3ML; MG/3ML
3 SOLUTION RESPIRATORY (INHALATION) EVERY 6 HOURS PRN
Qty: 120 ML | Refills: 0 | Status: ON HOLD | OUTPATIENT
Start: 2023-02-28

## 2023-02-28 RX ORDER — FERROUS SULFATE 325(65) MG
325 TABLET ORAL
Qty: 30 TABLET | Refills: 0 | Status: ON HOLD | OUTPATIENT
Start: 2023-03-01

## 2023-02-28 RX ORDER — PANTOPRAZOLE SODIUM 40 MG/1
40 TABLET, DELAYED RELEASE ORAL
Qty: 30 TABLET | Refills: 0 | Status: ON HOLD | OUTPATIENT
Start: 2023-03-01

## 2023-02-28 RX ADMIN — FERROUS SULFATE TAB 325 MG (65 MG ELEMENTAL FE) 325 MG: 325 (65 FE) TAB at 08:39

## 2023-02-28 RX ADMIN — HYDROXYUREA 500 MG: 500 CAPSULE ORAL at 08:39

## 2023-02-28 RX ADMIN — POTASSIUM CHLORIDE 10 MEQ: 750 TABLET, EXTENDED RELEASE ORAL at 08:39

## 2023-02-28 RX ADMIN — PANTOPRAZOLE SODIUM 40 MG: 40 TABLET, DELAYED RELEASE ORAL at 06:29

## 2023-02-28 RX ADMIN — BUDESONIDE 0.5 MG: 0.5 INHALANT ORAL at 08:09

## 2023-02-28 RX ADMIN — RIVAROXABAN 20 MG: 20 TABLET, FILM COATED ORAL at 08:39

## 2023-02-28 RX ADMIN — FLUTICASONE FUROATE AND VILANTEROL TRIFENATATE 1 PUFF: 200; 25 POWDER RESPIRATORY (INHALATION) at 09:07

## 2023-02-28 RX ADMIN — AMLODIPINE BESYLATE 5 MG: 5 TABLET ORAL at 08:39

## 2023-02-28 NOTE — PLAN OF CARE
Problem: Potential for Falls  Goal: Patient will remain free of falls  Description: INTERVENTIONS:  - Educate patient/family on patient safety including physical limitations  - Instruct patient to call for assistance with activity   - Consult OT/PT to assist with strengthening/mobility   - Keep Call bell within reach  - Keep bed low and locked with side rails adjusted as appropriate  - Keep care items and personal belongings within reach  - Initiate and maintain comfort rounds  - Make Fall Risk Sign visible to staff  - Offer Toileting every  Hours, in advance of need  - Initiate/Maintain alarm  - Obtain necessary fall risk management equipment:  - Apply yellow socks and bracelet for high fall risk patients  - Consider moving patient to room near nurses station  Outcome: Progressing     Problem: MOBILITY - ADULT  Goal: Maintain or return to baseline ADL function  Description: INTERVENTIONS:  -  Assess patient's ability to carry out ADLs; assess patient's baseline for ADL function and identify physical deficits which impact ability to perform ADLs (bathing, care of mouth/teeth, toileting, grooming, dressing, etc )  - Assess/evaluate cause of self-care deficits   - Assess range of motion  - Assess patient's mobility; develop plan if impaired  - Assess patient's need for assistive devices and provide as appropriate  - Encourage maximum independence but intervene and supervise when necessary  - Involve family in performance of ADLs  - Assess for home care needs following discharge   - Consider OT consult to assist with ADL evaluation and planning for discharge  - Provide patient education as appropriate  Outcome: Progressing  Goal: Maintains/Returns to pre admission functional level  Description: INTERVENTIONS:  - Perform BMAT or MOVE assessment daily    - Set and communicate daily mobility goal to care team and patient/family/caregiver     - Collaborate with rehabilitation services on mobility goals if consulted  - Perform Range of Motion  times a day  - Reposition patient every  hours    - Dangle patient  times a day  - Stand patient  times a day  - Ambulate patient  times a day  - Out of bed to chair  times a day   - Out of bed for meals  times a day  - Out of bed for toileting  - Record patient progress and toleration of activity level   Outcome: Progressing     Problem: PAIN - ADULT  Goal: Verbalizes/displays adequate comfort level or baseline comfort level  Description: Interventions:  - Encourage patient to monitor pain and request assistance  - Assess pain using appropriate pain scale  - Administer analgesics based on type and severity of pain and evaluate response  - Implement non-pharmacological measures as appropriate and evaluate response  - Consider cultural and social influences on pain and pain management  - Notify physician/advanced practitioner if interventions unsuccessful or patient reports new pain  Outcome: Progressing     Problem: INFECTION - ADULT  Goal: Absence or prevention of progression during hospitalization  Description: INTERVENTIONS:  - Assess and monitor for signs and symptoms of infection  - Monitor lab/diagnostic results  - Monitor all insertion sites, i e  indwelling lines, tubes, and drains  - Monitor endotracheal if appropriate and nasal secretions for changes in amount and color  - [place] appropriate cooling/warming therapies per order  - Administer medications as ordered  - Instruct and encourage patient and family to use good hand hygiene technique  - Identify and instruct in appropriate isolation precautions for identified infection/condition  Outcome: Progressing     Problem: SAFETY ADULT  Goal: Patient will remain free of falls  Description: INTERVENTIONS:  - Educate patient/family on patient safety including physical limitations  - Instruct patient to call for assistance with activity   - Consult OT/PT to assist with strengthening/mobility   - Keep Call bell within reach  - Keep bed low and locked with side rails adjusted as appropriate  - Keep care items and personal belongings within reach  - Initiate and maintain comfort rounds  - Make Fall Risk Sign visible to staff  - Offer Toileting every  Hours, in advance of need  - Initiate/Maintain alarm  - Obtain necessary fall risk management equipment:  - Apply yellow socks and bracelet for high fall risk patients  - Consider moving patient to room near nurses station  Outcome: Progressing     Problem: DISCHARGE PLANNING  Goal: Discharge to home or other facility with appropriate resources  Description: INTERVENTIONS:  - Identify barriers to discharge w/patient and caregiver  - Arrange for needed discharge resources and transportation as appropriate  - Identify discharge learning needs (meds, wound care, etc )  - Arrange for interpretive services to assist at discharge as needed  - Refer to Case Management Department for coordinating discharge planning if the patient needs post-hospital services based on physician/advanced practitioner order or complex needs related to functional status, cognitive ability, or social support system  Outcome: Progressing     Problem: Knowledge Deficit  Goal: Patient/family/caregiver demonstrates understanding of disease process, treatment plan, medications, and discharge instructions  Description: Complete learning assessment and assess knowledge base    Interventions:  - Provide teaching at level of understanding  - Provide teaching via preferred learning methods  Outcome: Progressing     Problem: Prexisting or High Potential for Compromised Skin Integrity  Goal: Skin integrity is maintained or improved  Description: INTERVENTIONS:  - Identify patients at risk for skin breakdown  - Assess and monitor skin integrity  - Assess and monitor nutrition and hydration status  - Monitor labs   - Assess for incontinence   - Turn and reposition patient  - Assist with mobility/ambulation  - Relieve pressure over bony prominences  - Avoid friction and shearing  - Provide appropriate hygiene as needed including keeping skin clean and dry  - Evaluate need for skin moisturizer/barrier cream  - Collaborate with interdisciplinary team   - Patient/family teaching  - Consider wound care consult   Outcome: Progressing     Problem: GASTROINTESTINAL - ADULT  Goal: Maintains or returns to baseline bowel function  Description: INTERVENTIONS:  - Assess bowel function  - Encourage oral fluids to ensure adequate hydration  - Administer IV fluids if ordered to ensure adequate hydration  - Administer ordered medications as needed  - Encourage mobilization and activity  - Consider nutritional services referral to assist patient with adequate nutrition and appropriate food choices  Outcome: Progressing     Problem: HEMATOLOGIC - ADULT  Goal: Maintains hematologic stability  Description: INTERVENTIONS  - Assess for signs and symptoms of bleeding or hemorrhage  - Monitor labs  - Administer supportive blood products/factors as ordered and appropriate  Outcome: Progressing

## 2023-02-28 NOTE — OCCUPATIONAL THERAPY NOTE
Occupational Therapy Screen    Patient Name: Shayne Rivera  [id number] Date: 2/28/2023 02/28/23 1220   OT Last Visit   OT Visit Date 02/28/23   Note Type   Note type Screen   Additional Comments OT orders received and chart reviewed  Per chart, pt resides at ranch home with son and is independent with ADL, uses cane  Spoke to patient who states pt is currently independent  Pt has been able to ambulate independently to bathroom for ADLs while hospitalized  Pt reports her son is always home with her and does the IADL tasks  Recommend pt continue to be OOB for meals, ambulation to/from BR, perform self care tasks, and mobility in hallway with nursing  At this time, OT recommendations at time of discharge are return home with family support  No acute OT needs identified at this time; please re-consult if OT needs arise during remainder of hospital stay         Luigi Tamoco, MS, OTR/L

## 2023-02-28 NOTE — DISCHARGE SUMMARY
[place]  Discharge- Yoan Tatum 3/22/1926, 80 y o  female MRN: [id number]  Unit/Bed#: -01 Encounter: [id number]  Primary Care Provider: SLIM Goode   Date and time admitted to hospital: 2/24/2023 12:40 PM    * Syncope  Assessment & Plan  · Suspect vasovagal as this occurred while patient was having a BM on the toilet  She had a similar episode last year  · Trauma workup in ED negative  · Trops WNL  ·   · Orthostatic vitals positive on 2/24   · Repeat orthostatic BP 2/26 negative  · PT/OT- ambulating independently, appropriate for discharge home  · Echo: completed -feels mass, possibly hiatal hernia and left atrium  Discussed with cardiology, no intervention planned given patient age, not a surgical candidate  Suspect 2/2 orthostatic hypotension vs acute anemia/GIB     CML (chronic myelocytic leukemia) ([facility])  Assessment & Plan  · 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 and takes a small white pill every day for it" he claims this was discovered during and admission for pneumonia last year when WBCs were 27K  · Patient is on hydroxyurea, continue  · Follows with Dr Regina Junior in [place]  · Declined bone marrow biopsy due to patient age  · Continue outpatient follow up  · WBC 12 9 on admission --> resolved to 7 without abx  · CXR & UA without evidence of infection     Chronic anticoagulation  Assessment & Plan  · History of PE and now CML  · Maintained on Xarelto 20mg daily --> restarted 2/27    BRBPR (bright red blood per rectum)  Assessment & Plan  · Patient reported melanic stools PTA, noted to have BRPR while inpatient + acute anemia  · Hgb 8 --> 6 9  · S/P 1 unit PRBC 2/25  · GI consulted   · Xarelto initially held  · Suspect this was lower bleeding probably diverticular  · No further bleeding    Xarelto resumed 2/27    Hypertension  Assessment & Plan  · Maintained on HCTZ and Norvasc outpatient  · Resume Norvasc at 5mg  · Hold HCTZ- discontinued  · Pressures stable    COPD (chronic obstructive pulmonary disease) ([facility])  Assessment & Plan  · Not in acute exacerbation    Anemia  Assessment & Plan  · Baseline Hgb 12 6 in Jan 2023  · Hgb on admission 8 6 --> dropped to 6 9  · Acute GIB/BRBPR  · Iron panel: Iron 43, Iron sat 24, TIBC 176, Ferritin 50   · S/P 1 unit PRBC 2/25  · Monitor H&H, Xarelto initially held  · Bleeding appears to have resolved  Discussed with gastroenterology, will resume Xarelto today and monitor    Large hiatal hernia  Assessment & Plan  · Continue PPI    Medical Problems     Resolved Problems  Date Reviewed: 2/28/2023   None       Discharging Physician / Practitioner: Avinash Brown PA-C  PCP: Dimitris Rubio  Admission Date:   Admission Orders (From admission, onward)     Ordered        02/25/23 1453  Inpatient Admission  Once            02/24/23 1442  Place in Observation  Once                      Discharge Date: 02/28/23    Consultations During Hospital Stay:  · Cardiology  · Gastroenterology    Procedures Performed:   · Echocardiogram 2/24:   •  Left Ventricle: Wall thickness is mildly increased  There is mild to moderate concentric hypertrophy  The left ventricular ejection fraction is 65-70% by visual estimation  Systolic function is vigorous  Wall motion is normal  Diastolic function is abnormal  There is  outflow tract dynamic obstruction at rest with a peak gradient of 17 0 mmHg  •  Left Atrium: There is a possible large, solid mass  This is possibly external compression from an extracardiac mass however intra-atrial mass cannot be ruled out on the basis of this study  The mass encompasses the majority of the left atrial volume and is impairing blood flow on color Doppler interrogation  •  Aortic Valve: There is mild stenosis  •  Mitral Valve:  There is moderate stenosis      Likely significant external compression of the left atrium which may be attributable to known large hiatal hernia with intrathoracic stomach and transverse colon previously identified by CT scan 1/21/23  However, large intra-atrial mass cannot be ruled out on the basis of this study  There does appear to be some effect on trans-atrial blood flow as a result of this mass or compression  Significant Findings / Test Results:   · CXR 2/24: No radiographic findings of acute thoracic injury  Large hiatal hernia  · Xray pelvis 2/24: No acute osseous abnormality  · CT head 2/24: No acute intracranial abnormality  Incidental Findings:   · See above  · I reviewed the above mentioned incidental findings with the patient and/or family and they expressed understanding  Test Results Pending at Discharge (will require follow up): · None     Outpatient Tests Requested:  · None    Complications: GI noted shortly after admission    Reason for Admission: Syncope while having BM    Hospital Course:   Jerad May is a 80 y o  female patient with a past medical history of COPD, hypertension, CML, prior PE on lifelong Xarelto who originally presented to the hospital on 2/24/2023 due to syncopal episode  Patient reported having a bowel movement after which she lost consciousness and landed on the ground, she could not get up, called her son who lives with her to assist her  Patient admitted for syncopal work-up, monitored on telemetry, echo revealed large mass of left atrium, suspected hiatal hernia  Consulted cardiology who are not recommending intervention given patient age  Nursing noted an episode of BRBPR  She received one unit PRBCs and Xarelto initially held  Cleared by GI to resume use of Xaralto  No imaging given patient age and comorbidities  Hemodynamically stable at time of discharge and appropriate for outpatient follow up  Maintain appropriate outpatient fluid intake  Please see above list of diagnoses and related plan for additional information       Condition at Discharge: stable    Discharge Day Visit / Exam: Subjective:  Patient does well and wants to go home today  She is aware of the abnormal finding on her echo reports she has always had a large hernia and suspects it is this, no further work-up desired  Vitals: Blood Pressure: (!) 124/44 (02/28/23 1410)  Pulse: 83 (02/28/23 1410)  Temperature: (!) 97 4 °F (36 3 °C) (02/28/23 1410)  Temp Source: Oral (02/27/23 2010)  Respirations: 18 (02/27/23 2010)  Height: 4' 10" (147 3 cm) (02/24/23 1647)  Weight - Scale: 57 2 kg (126 lb) (02/24/23 1647)  SpO2: 97 % (02/28/23 1410)  Exam:   Physical Exam  Vitals and nursing note reviewed  Constitutional:       General: She is not in acute distress  Appearance: Normal appearance  She is well-developed  HENT:      Head: Normocephalic and atraumatic  Eyes:      General: No scleral icterus  Conjunctiva/sclera: Conjunctivae normal    Cardiovascular:      Rate and Rhythm: Normal rate and regular rhythm  Heart sounds: Murmur heard  Pulmonary:      Effort: Pulmonary effort is normal       Breath sounds: No wheezing, rhonchi or rales  Abdominal:      General: There is no distension  Palpations: Abdomen is soft  Skin:     General: Skin is warm and dry  Neurological:      General: No focal deficit present  Mental Status: She is alert  Psychiatric:         Mood and Affect: Mood normal         Discussion with Family: Attempted to update  (son) via phone  Unable to contact  Discharge instructions/Information to patient and family:   See after visit summary for information provided to patient and family  Provisions for Follow-Up Care:  See after visit summary for information related to follow-up care and any pertinent home health orders  Disposition:   Home    Planned Readmission: none     Discharge Statement:  I spent 65 minutes discharging the patient  This time was spent on the day of discharge  I had direct contact with the patient on the day of discharge   Greater than 50% of the total time was spent examining patient, answering all patient questions, arranging and discussing plan of care with patient as well as directly providing post-discharge instructions  Additional time then spent on discharge activities  Discharge Medications:  See after visit summary for reconciled discharge medications provided to patient and/or family        **Please Note: This note may have been constructed using a voice recognition system**

## 2023-02-28 NOTE — ASSESSMENT & PLAN NOTE
· 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 and takes a small white pill every day for it" he claims this was discovered during and admission for pneumonia last year when WBCs were 27K  · Patient is on hydroxyurea, continue  · Follows with Dr Alli Iverson in [place]  · Declined bone marrow biopsy due to patient age  · Continue outpatient follow up  · WBC 12 9 on admission --> resolved to 7 without abx  · CXR & UA without evidence of infection

## 2023-02-28 NOTE — PROGRESS NOTES
Progress note - Gastroenterology   Urmila Webb 80 y o  female MRN: [id number]  Unit/Bed#: -01 Encounter: [id number]    ASSESSMENT and PLAN  80year-old female admitted with syncopal episode  Noted to be anemic, bright red blood per rectum  On Xarelto for history of pulmonary emboli, history of CML  Suspect diverticular bleeding  Required 1 unit PRBCs     1  Anemia  2  Rectal bleeding  No further rectal bleeding- brown stool yesterday, no melena  Hemoglobin stable at 7 8   Suspect diverticular bleed in setting of anticoagulation  Xarelto resumed yesterday 2/27  Stable from GI standpoint for discharge  -Continue to trend hemoglobin  -Okay for regular diet  -No plans for endoscopic evaluation unless she develops active bleeding       Chief Complaint   Patient presents with   • Fall     Pt to er after falling this am after using the restroom and passing out after a bowel movement       SUBJECTIVE/HPI   Nursing reports formed brown stool yesterday, no melena or rectal bleeding    No BM today  Patient without complaints-denies abdominal pain  Appetite good, no nausea or vomiting  Xarelto resumed yesterday 2/27  Hemoglobin stable at 7 8  Iron panel consistent with anemia of chronic disease    /54   Pulse 74   Temp 97 6 °F (36 4 °C)   Resp 18   Ht 4' 10" (1 473 m)   Wt 57 2 kg (126 lb)   SpO2 93%   BMI 26 33 kg/m²     PHYSICALEXAM  General appearance: alert, appears comfortable  Eyes: no icterus   Head: Normocephalic, without obvious abnormality, atraumatic  Lungs: clear to auscultation bilaterally  Heart: regular rate and rhythm, S1, S2 normal, no murmur, click, rub or gallop  Abdomen: soft, nondistended, non-tender; bowel sounds normal   No bowel movement today  Extremities: extremities normal, atraumatic, no cyanosis or edema  Neurologic: Grossly normal    Lab Results   Component Value Date    GLUCOSE 90 03/16/2015    CALCIUM 8 5 02/28/2023     03/16/2015    K 4 2 02/28/2023    CO2 28 02/28/2023     02/28/2023    BUN 16 02/28/2023    CREATININE 0 59 (L) 02/28/2023     Lab Results   Component Value Date    WBC 6 61 02/28/2023    HGB 7 8 (L) 02/28/2023    HCT 24 6 (L) 02/28/2023     (H) 02/28/2023     02/28/2023     Lab Results   Component Value Date    ALT 9 02/28/2023    AST 11 (L) 02/28/2023    ALKPHOS 68 02/28/2023    BILITOT 0 3 03/16/2015       Lab Results   Component Value Date    IRON 43 (L) 02/25/2023    TIBC 176 (L) 02/25/2023    FERRITIN 50 02/25/2023     No results found for: INR

## 2023-02-28 NOTE — ASSESSMENT & PLAN NOTE
· Maintained on HCTZ and Norvasc outpatient  · Resume Norvasc at 5mg  · Hold HCTZ- discontinued  · Pressures stable

## 2023-02-28 NOTE — PLAN OF CARE
Problem: Potential for Falls  Goal: Patient will remain free of falls  Description: INTERVENTIONS:  - Educate patient/family on patient safety including physical limitations  - Instruct patient to call for assistance with activity   - Consult OT/PT to assist with strengthening/mobility   - Keep Call bell within reach  - Keep bed low and locked with side rails adjusted as appropriate  - Keep care items and personal belongings within reach  - Initiate and maintain comfort rounds  - Make Fall Risk Sign visible to staff  - Offer Toileting every  Hours, in advance of need  - Initiate/Maintain alarm  - Obtain necessary fall risk management equipment:  - Apply yellow socks and bracelet for high fall risk patients  - Consider moving patient to room near nurses station  Outcome: Progressing     Problem: MOBILITY - ADULT  Goal: Maintain or return to baseline ADL function  Description: INTERVENTIONS:  -  Assess patient's ability to carry out ADLs; assess patient's baseline for ADL function and identify physical deficits which impact ability to perform ADLs (bathing, care of mouth/teeth, toileting, grooming, dressing, etc )  - Assess/evaluate cause of self-care deficits   - Assess range of motion  - Assess patient's mobility; develop plan if impaired  - Assess patient's need for assistive devices and provide as appropriate  - Encourage maximum independence but intervene and supervise when necessary  - Involve family in performance of ADLs  - Assess for home care needs following discharge   - Consider OT consult to assist with ADL evaluation and planning for discharge  - Provide patient education as appropriate  Outcome: Progressing     Problem: INFECTION - ADULT  Goal: Absence or prevention of progression during hospitalization  Description: INTERVENTIONS:  - Assess and monitor for signs and symptoms of infection  - Monitor lab/diagnostic results  - Monitor all insertion sites, i e  indwelling lines, tubes, and drains  - Monitor endotracheal if appropriate and nasal secretions for changes in amount and color  - [place] appropriate cooling/warming therapies per order  - Administer medications as ordered  - Instruct and encourage patient and family to use good hand hygiene technique  - Identify and instruct in appropriate isolation precautions for identified infection/condition  Outcome: Progressing     Problem: SAFETY ADULT  Goal: Patient will remain free of falls  Description: INTERVENTIONS:  - Educate patient/family on patient safety including physical limitations  - Instruct patient to call for assistance with activity   - Consult OT/PT to assist with strengthening/mobility   - Keep Call bell within reach  - Keep bed low and locked with side rails adjusted as appropriate  - Keep care items and personal belongings within reach  - Initiate and maintain comfort rounds  - Make Fall Risk Sign visible to staff  - Offer Toileting every  Hours, in advance of need  - Initiate/Maintain alarm  - Obtain necessary fall risk management equipment:  - Apply yellow socks and bracelet for high fall risk patients  - Consider moving patient to room near nurses station  Outcome: Progressing

## 2023-02-28 NOTE — PHYSICAL THERAPY NOTE
Physical Therapy Screen    Patient Name: Rich Kaba    KYKMG'T Date: 2/28/2023     Problem List  Principal Problem:    Syncope  Active Problems:    Chronic anticoagulation    Large hiatal hernia    Anemia    COPD (chronic obstructive pulmonary disease) (HCC)    Hypertension    CML (chronic myelocytic leukemia) ([facility] [address] )    BRBPR (bright red blood per rectum)       Past Medical History  Past Medical History:   Diagnosis Date    COPD (chronic obstructive pulmonary disease) ([facility] [address] )     Hiatal hernia     Hypertension     Melanoma in situ of the skin ([facility] [address] )     Multiple sites removed    Pneumonia     [facility]     on Xarelto    Skin cancer         Past Surgical History  Past Surgical History:   Procedure Laterality Date    APPENDECTOMY      HYSTERECTOMY      [place] ESOPHAGOGASTRODUODENOSCOPY TRANSORAL DIAGNOSTIC N/A 5/1/2019    Procedure: ESOPHAGOGASTRODUODENOSCOPY (EGD); Surgeon: Patricia Phillips MD;  Location:  MAIN OR;  Service: Gastroenterology    SKIN CANCER EXCISION      SKIN CANCER EXCISION      UPPER GASTROINTESTINAL ENDOSCOPY          02/28/23 1223   PT Last Visit   PT Visit Date 02/28/23   Note Type   Note type Screen   Additional Comments Chart review completed  Per chart, patient is documented as independent in the room  Spoke with patient who reports the same  States that she has been going to/from bathroom without an AD  Will discharge orders  Altagracia Santillan

## 2023-02-28 NOTE — CONSULTS
Consult - Cardiology   Wendi Kwok 80 y o  female MRN: [id number]  Unit/Bed#: -01 Encounter: [id number]        Reason For Consult: Abnormal echocardiogram  Outpatient Cardiologist:               ASSESSMENT:  1  Syncope, suspect vasovagal  a  To the ER 2/24/2023 with syncopal episode in the setting of bowel movement  b  CT scan of head negative  c  Orthostatic vital signs negative  d  Troponin 6 --> 6 --> 6  e  EKG reportedly sinus rhythm with heart rate of 73 normal T waves and no ST segment deviation, unable to review study myself  2  Abnormal echocardiogram  a  Echo 2/24/2023: EF 65-70%, dynamic outflow tract obstruction at rest with peak gradient 17, possible large mass in left atrium although this could also possibly be external compression from extracardiac mass, mild AS, moderate MS  3  Large hiatal hernia  a  CT chest 1/21/2023: Large hiatal hernia consisting of intrathoracic stomach, large segment of transverse colon, and contiguous mesentery and omentum  4  Acute blood loss anemia  a  Hemoglobin was 6 3 on admission  b  She received 1 unit of packed red blood cells and this morning it is up to 7 8 at the time of this consult  c  She has been seen by GI and suspected to have possible diverticular bleed which has been treated conservatively given her advanced age  11  CML  6  History of hypertension  7  COPD  8  Chronic anticoagulation with Xarelto due to history of PE    PLAN/ DISCUSSION:     • She is currently stable and asymptomatic ambulating around her room comfortably and reporting normal bowel movement without any further episodes of syncope or near syncope  Suspect that her syncopal episode was secondary to increased vagal tone during bowel movement as well as blood loss  • Unclear what the abnormality around her left atrium is on echocardiogram, but in any scenario it does seem asymptomatic right now   We discussed the possibility of a VIVIANA for better evaluation of her left atrium however if this would not change her management (not a surgical candidate) then there really is limited utility in putting her through anesthesia  Although she is healthy for her age, there is still always risk with anesthesia  At this point I would lean towards conservative management however will discuss further with attending later today  • Given dynamic outflow tract obstruction as well as blood flow impairment on Doppler images and left atrium recommend that she be very careful to avoid dehydrated states  She reports that she has been drinking less than normal lately and will be more diligent about maintaining good oral fluid intake    History Of Present Illness: This is a pleasant 80year-old without prior care from a cardiologist   Presently she is hospitalized for syncopal episode  She came to the hospital on 2/24/2023  She awoke that day in her normal state of health  She had a bowel movement in the morning in her bathroom and felt fine  She then felt too weak to get up  She called her son who helped her up  Upon going from sitting to standing she passed out  She is unsure if she lost consciousness  She woke up sitting on the floor between the toilet and the trash can  Her son then was able to gradually stand her up and drove her to the emergency department  Hemoglobin was low on arrival down to 6 3  She was given a unit of blood  She was also hydrated  She was seen by GI and recommended conservative management as long as she stopped bleeding  Xarelto was temporarily held but then restarted yesterday for history of PE  An echo was performed which was abnormal as above for which reason we have been asked to see her in consultation  Presently she feels quite well  She has been walking around  She is eager to go home      Past Medical History:        Past Medical History:   Diagnosis Date   • COPD (chronic obstructive pulmonary disease) ([facility] [address] )    • Hiatal hernia    • Hypertension    • Melanoma in situ of the skin [facility])     Multiple sites removed   • Pneumonia    • Pulmonary embolism ([address] )     on Xarelto   • Skin cancer       Past Surgical History:   Procedure Laterality Date   • APPENDECTOMY     • HYSTERECTOMY     • OR ESOPHAGOGASTRODUODENOSCOPY TRANSORAL DIAGNOSTIC N/A 5/1/2019    Procedure: ESOPHAGOGASTRODUODENOSCOPY (EGD); Surgeon: Shanna Alcala MD;  Location:  MAIN OR;  Service: Gastroenterology   • SKIN CANCER EXCISION     • SKIN CANCER EXCISION     • UPPER GASTROINTESTINAL ENDOSCOPY          Allergy:        No Known Allergies    Medications:       Prior to Admission medications    Medication Sig Start Date End Date Taking? Authorizing Provider   albuterol (PROVENTIL HFA,VENTOLIN HFA) 90 mcg/act inhaler Inhale daily as needed    Yes Historical Provider, MD   amLODIPine (NORVASC) 10 mg tablet daily 4/8/19  Yes Historical Provider, MD   budesonide (PULMICORT) 0 5 mg/2 mL nebulizer solution Take 0 5 mg by nebulization 2 (two) times a day Rinse mouth after use  Yes Historical Provider, MD   guaiFENesin (MUCINEX) 600 mg 12 hr tablet Take 600 mg by mouth every 12 (twelve) hours   Yes Historical Provider, MD   hydroxyurea (HYDREA) 500 mg capsule Take by mouth daily   Yes Historical Provider, MD   mometasone-formoterol (DULERA) 200-5 MCG/ACT inhaler Inhale 2 puffs 2 (two) times a day Rinse mouth after use     Yes Historical Provider, MD   montelukast (SINGULAIR) 10 mg tablet Take by mouth daily at bedtime    Yes Historical Provider, MD   pantoprazole (PROTONIX) 40 mg tablet Take 40 mg by mouth daily   Yes Historical Provider, MD   potassium chloride (MICRO-K) 10 MEQ CR capsule Take 10 mEq by mouth 2 (two) times a day   Yes Historical Provider, MD   XARELTO 20 MG tablet daily  3/21/19  Yes Historical Provider, MD   albuterol (2 5 mg/3 mL) 0 083 % nebulizer solution Take 2 5 mg by nebulization 4 (four) times a day 1/10/19   Historical Provider, MD   hydrochlorothiazide (HYDRODIURIL) 50 mg tablet Take by mouth daily     Historical Provider, MD       Family History:     Family History   Problem Relation Age of Onset   • Heart disease Mother    • Diabetes Father    • Diabetes Sister    • Cancer Brother    • Colon polyps Brother    • Diabetes Brother         Social History:       Social History     Socioeconomic History   • Marital status:      Spouse name: None   • Number of children: None   • Years of education: None   • Highest education level: None   Occupational History   • None   Tobacco Use   • Smoking status: Former     Types: Cigarettes     Quit date: 1970     Years since quittin 1   • Smokeless tobacco: Never   Vaping Use   • Vaping Use: Never used   Substance and Sexual Activity   • Alcohol use: Never   • Drug use: Never   • Sexual activity: None   Other Topics Concern   • None   Social History Narrative   • None     Social Determinants of Health     Financial Resource Strain: Not on file   Food Insecurity: No Food Insecurity   • Worried About Running Out of Food in the Last Year: Never true   • Ran Out of Food in the Last Year: Never true   Transportation Needs: No Transportation Needs   • Lack of Transportation (Medical): No   • Lack of Transportation (Non-Medical): No   Physical Activity: Not on file   Stress: Not on file   Social Connections: Not on file   Intimate Partner Violence: Not on file   Housing Stability: Low Risk    • Unable to Pay for Housing in the Last Year: No   • Number of Places Lived in the Last Year: 1   • Unstable Housing in the Last Year: No       ROS:  14 point ROS negative except as outlined above  Remainder review of systems is negative    Exam:  General:  alert, oriented and in no distress, cooperative  Head: Normocephalic, atraumatic  Eyes:  EOMI  Pupils - equal, round, reactive to accomodation  No icterus  Normal Conjunctiva     Oropharynx: moist and normal-appearing mucosa  Neck: supple, symmetrical, trachea midline and no JVD  Heart:  RRR, No: murmer, rub or gallop, S1 & S2 normal   Respiratory effort / Chest Inspection: unlabored  Lungs:  normal air entry, lungs clear to auscultation and no rales, rhonchi or wheezing   Abdomen: flat, normal findings: bowel sounds normal and soft, non-tender  Lower Limbs:  no pitting edema  Pulses[de-identified]  RLE - DP: present 2+                 LLE - DP: present 2+  Musculoskeletal: ROM grossly normal        DATA:      ECG:                     Telemetry: No longer on telemetry          Echocardiogram:           Ischemic Testing:         Weights: Wt Readings from Last 3 Encounters:   02/24/23 57 2 kg (126 lb)   12/18/20 57 2 kg (126 lb)   12/18/19 55 3 kg (122 lb)   , Body mass index is 26 33 kg/m²           Lab Studies:             Results from last 7 days   Lab Units 02/28/23  0450 02/27/23  0448 02/26/23  0404   WBC Thousand/uL 6 61 7 26 9 26   HEMOGLOBIN g/dL 7 8* 7 8* 8 3*   HEMATOCRIT % 24 6* 24 2* 25 2*   PLATELETS Thousands/uL 218 233 239   ,   Results from last 7 days   Lab Units 02/28/23 0450 02/27/23 0448 02/26/23  0404   POTASSIUM mmol/L 4 2 4 0 3 8   CHLORIDE mmol/L 108 109* 109*   CO2 mmol/L 28 28 29   BUN mg/dL 16 16 14   CREATININE mg/dL 0 59* 0 55* 0 53*   CALCIUM mg/dL 8 5 8 3* 7 8*   ALK PHOS U/L 68 72 77   ALT U/L 9 9 10   AST U/L 11* 12* 14

## 2023-02-28 NOTE — ASSESSMENT & PLAN NOTE
· Patient reported melanic stools PTA, noted to have BRPR while inpatient + acute anemia  · Hgb 8 --> 6 9  · S/P 1 unit PRBC 2/25  · GI consulted   · Xarelto initially held  · Suspect this was lower bleeding probably diverticular  · No further bleeding    Xarelto resumed 2/27

## 2023-02-28 NOTE — ASSESSMENT & PLAN NOTE
· Suspect vasovagal as this occurred while patient was having a BM on the toilet  She had a similar episode last year  · Trauma workup in ED negative  · Trops WNL  ·   · Orthostatic vitals positive on 2/24   · Repeat orthostatic BP 2/26 negative  · PT/OT- ambulating independently, appropriate for discharge home  · Echo: completed -feels mass, possibly hiatal hernia and left atrium  Discussed with cardiology, no intervention planned given patient age, not a surgical candidate      Suspect 2/2 orthostatic hypotension vs acute anemia/GIB

## 2023-03-01 NOTE — NURSING NOTE
Resting comfortably during hourly rounds  Safety maintained, call bell within reach   Discharged to home via wheelchair with son, escorted by PCA

## 2023-03-01 NOTE — UTILIZATION REVIEW
NOTIFICATION OF ADMISSION DISCHARGE   This is a Notification of Discharge from [facility]  Please be advised that this patient has been discharge from our facility  Below you will find the admission and discharge date and time including the patient’s disposition  UTILIZATION REVIEW CONTACT:  Trinh Balbuena  Utilization   Network Utilization Review Department  Phone: [phone] carefully listen to the prompts  All voicemails are confidential   Email: [email or web address]     ADMISSION INFORMATION  PRESENTATION DATE: 2/24/2023 12:40 PM  OBERVATION ADMISSION DATE:   INPATIENT ADMISSION DATE: 2/25/23  2:53 PM   DISCHARGE DATE: 2/28/2023  6:51 PM   DISPOSITION:Home/Self Care    IMPORTANT INFORMATION:  Send all requests for admission clinical reviews, approved or denied determinations and any other requests to dedicated fax number below belonging to the campus where the patient is receiving treatment   List of dedicated fax numbers:  [address] DENIALS (Administrative/Medical Necessity) [phone]   [address] (Maternity/NICU/Pediatrics) [phone]   [facility] [phone]   [facility] [address] [phone]   [address] [phone]   [address] [facility] [phone]   [address] [phone]   [facility] [address] [phone]   [facility]  [phone]   [address] [facility] [phone] [address] [facility] [address] [facility] [phone]

## 2023-03-06 ENCOUNTER — APPOINTMENT (OUTPATIENT)
Dept: RADIOLOGY | Facility: HOSPITAL | Age: 88
End: 2023-03-06

## 2023-03-06 ENCOUNTER — HOSPITAL ENCOUNTER (INPATIENT)
Facility: HOSPITAL | Age: 88
LOS: 3 days | Discharge: HOME HEALTH | End: 2023-03-09
Attending: EMERGENCY MEDICINE | Admitting: STUDENT IN AN ORGANIZED HEALTH CARE EDUCATION/TRAINING PROGRAM

## 2023-03-06 DIAGNOSIS — J44.1 COPD EXACERBATION (HCC): Primary | ICD-10-CM

## 2023-03-06 DIAGNOSIS — J18.9 PNEUMONIA: ICD-10-CM

## 2023-03-06 PROBLEM — R09.02 HYPOXIA: Status: ACTIVE | Noted: 2023-03-06

## 2023-03-06 PROBLEM — A41.9 SEPSIS (HCC): Status: ACTIVE | Noted: 2023-03-06

## 2023-03-06 LAB
2HR DELTA HS TROPONIN: 3 NG/L
ALBUMIN SERPL BCP-MCNC: 3.4 G/DL (ref 3.5–5)
ALP SERPL-CCNC: 82 U/L (ref 34–104)
ALT SERPL W P-5'-P-CCNC: 12 U/L (ref 7–52)
ANION GAP SERPL CALCULATED.3IONS-SCNC: 8 MMOL/L (ref 4–13)
APTT PPP: 55 SECONDS (ref 23–37)
AST SERPL W P-5'-P-CCNC: 19 U/L (ref 13–39)
ATRIAL RATE: 105 BPM
BASE EXCESS BLDA CALC-SCNC: -1 MMOL/L (ref -2–3)
BASOPHILS # BLD AUTO: 0.03 THOUSANDS/ÂΜL (ref 0–0.1)
BASOPHILS NFR BLD AUTO: 0 % (ref 0–1)
BILIRUB SERPL-MCNC: 0.51 MG/DL (ref 0.2–1)
BNP SERPL-MCNC: 238 PG/ML (ref 0–100)
BUN SERPL-MCNC: 15 MG/DL (ref 5–25)
CA-I BLD-SCNC: 1 MMOL/L (ref 1.12–1.32)
CALCIUM ALBUM COR SERPL-MCNC: 8.3 MG/DL (ref 8.3–10.1)
CALCIUM SERPL-MCNC: 7.8 MG/DL (ref 8.4–10.2)
CARDIAC TROPONIN I PNL SERPL HS: 19 NG/L
CARDIAC TROPONIN I PNL SERPL HS: 22 NG/L
CHLORIDE SERPL-SCNC: 106 MMOL/L (ref 96–108)
CO2 SERPL-SCNC: 23 MMOL/L (ref 21–32)
CREAT SERPL-MCNC: 0.74 MG/DL (ref 0.6–1.3)
D DIMER PPP FEU-MCNC: 0.9 UG/ML FEU
EOSINOPHIL # BLD AUTO: 0 THOUSAND/ÂΜL (ref 0–0.61)
EOSINOPHIL NFR BLD AUTO: 0 % (ref 0–6)
ERYTHROCYTE [DISTWIDTH] IN BLOOD BY AUTOMATED COUNT: 16.8 % (ref 11.6–15.1)
FLUAV RNA RESP QL NAA+PROBE: NEGATIVE
FLUBV RNA RESP QL NAA+PROBE: NEGATIVE
GFR SERPL CREATININE-BSD FRML MDRD: 68 ML/MIN/1.73SQ M
GLUCOSE SERPL-MCNC: 105 MG/DL (ref 65–140)
GLUCOSE SERPL-MCNC: 107 MG/DL (ref 65–140)
HCO3 BLDA-SCNC: 23.3 MMOL/L (ref 24–30)
HCT VFR BLD AUTO: 27.5 % (ref 34.8–46.1)
HCT VFR BLD CALC: 23 % (ref 34.8–46.1)
HGB BLD-MCNC: 8.7 G/DL (ref 11.5–15.4)
HGB BLDA-MCNC: 7.8 G/DL (ref 11.5–15.4)
IMM GRANULOCYTES # BLD AUTO: 0.07 THOUSAND/UL (ref 0–0.2)
IMM GRANULOCYTES NFR BLD AUTO: 1 % (ref 0–2)
INR PPP: 4.22 (ref 0.84–1.19)
LACTATE SERPL-SCNC: 1.4 MMOL/L (ref 0.5–2)
LIPASE SERPL-CCNC: 11 U/L (ref 11–82)
LYMPHOCYTES # BLD AUTO: 0.57 THOUSANDS/ÂΜL (ref 0.6–4.47)
LYMPHOCYTES NFR BLD AUTO: 6 % (ref 14–44)
MAGNESIUM SERPL-MCNC: 2 MG/DL (ref 1.9–2.7)
MCH RBC QN AUTO: 36.7 PG (ref 26.8–34.3)
MCHC RBC AUTO-ENTMCNC: 31.6 G/DL (ref 31.4–37.4)
MCV RBC AUTO: 116 FL (ref 82–98)
MONOCYTES # BLD AUTO: 0.89 THOUSAND/ÂΜL (ref 0.17–1.22)
MONOCYTES NFR BLD AUTO: 9 % (ref 4–12)
NEUTROPHILS # BLD AUTO: 8.02 THOUSANDS/ÂΜL (ref 1.85–7.62)
NEUTS SEG NFR BLD AUTO: 84 % (ref 43–75)
NRBC BLD AUTO-RTO: 0 /100 WBCS
P AXIS: 82 DEGREES
PCO2 BLD: 24 MMOL/L (ref 21–32)
PCO2 BLD: 35.7 MM HG (ref 42–50)
PH BLD: 7.42 [PH] (ref 7.3–7.4)
PLATELET # BLD AUTO: 320 THOUSANDS/UL (ref 149–390)
PMV BLD AUTO: 9.5 FL (ref 8.9–12.7)
PO2 BLD: 36 MM HG (ref 35–45)
POTASSIUM BLD-SCNC: 6.3 MMOL/L (ref 3.5–5.3)
POTASSIUM SERPL-SCNC: 4 MMOL/L (ref 3.5–5.3)
PR INTERVAL: 170 MS
PROCALCITONIN SERPL-MCNC: 0.69 NG/ML
PROT SERPL-MCNC: 5.7 G/DL (ref 6.4–8.4)
PROTHROMBIN TIME: 42.4 SECONDS (ref 11.6–14.5)
QRS AXIS: 54 DEGREES
QRSD INTERVAL: 134 MS
QT INTERVAL: 370 MS
QTC INTERVAL: 489 MS
RBC # BLD AUTO: 2.37 MILLION/UL (ref 3.81–5.12)
RSV RNA RESP QL NAA+PROBE: NEGATIVE
SAO2 % BLD FROM PO2: 70 % (ref 60–85)
SARS-COV-2 RNA RESP QL NAA+PROBE: NEGATIVE
SODIUM BLD-SCNC: 137 MMOL/L (ref 136–145)
SODIUM SERPL-SCNC: 137 MMOL/L (ref 135–147)
SPECIMEN SOURCE: ABNORMAL
T WAVE AXIS: 102 DEGREES
VENTRICULAR RATE: 105 BPM
WBC # BLD AUTO: 9.58 THOUSAND/UL (ref 4.31–10.16)

## 2023-03-06 RX ORDER — ALBUTEROL SULFATE 2.5 MG/3ML
2.5 SOLUTION RESPIRATORY (INHALATION) 4 TIMES DAILY
Status: DISCONTINUED | OUTPATIENT
Start: 2023-03-06 | End: 2023-03-06

## 2023-03-06 RX ORDER — BENZONATATE 100 MG/1
100 CAPSULE ORAL 3 TIMES DAILY PRN
Status: DISCONTINUED | OUTPATIENT
Start: 2023-03-06 | End: 2023-03-09 | Stop reason: HOSPADM

## 2023-03-06 RX ORDER — FLUTICASONE FUROATE AND VILANTEROL 200; 25 UG/1; UG/1
1 POWDER RESPIRATORY (INHALATION)
Status: DISCONTINUED | OUTPATIENT
Start: 2023-03-06 | End: 2023-03-06

## 2023-03-06 RX ORDER — GUAIFENESIN 600 MG/1
600 TABLET, EXTENDED RELEASE ORAL EVERY 12 HOURS SCHEDULED
Status: DISCONTINUED | OUTPATIENT
Start: 2023-03-06 | End: 2023-03-09 | Stop reason: HOSPADM

## 2023-03-06 RX ORDER — PANTOPRAZOLE SODIUM 40 MG/1
40 TABLET, DELAYED RELEASE ORAL DAILY
Status: DISCONTINUED | OUTPATIENT
Start: 2023-03-06 | End: 2023-03-09 | Stop reason: HOSPADM

## 2023-03-06 RX ORDER — FERROUS SULFATE 325(65) MG
325 TABLET ORAL
Status: DISCONTINUED | OUTPATIENT
Start: 2023-03-07 | End: 2023-03-09 | Stop reason: HOSPADM

## 2023-03-06 RX ORDER — MONTELUKAST SODIUM 10 MG/1
10 TABLET ORAL
Status: DISCONTINUED | OUTPATIENT
Start: 2023-03-06 | End: 2023-03-09 | Stop reason: HOSPADM

## 2023-03-06 RX ORDER — ACETAMINOPHEN 325 MG/1
650 TABLET ORAL EVERY 6 HOURS PRN
Status: DISCONTINUED | OUTPATIENT
Start: 2023-03-06 | End: 2023-03-09 | Stop reason: HOSPADM

## 2023-03-06 RX ORDER — CEFTRIAXONE 1 G/50ML
1000 INJECTION, SOLUTION INTRAVENOUS EVERY 24 HOURS
Status: DISCONTINUED | OUTPATIENT
Start: 2023-03-06 | End: 2023-03-09 | Stop reason: HOSPADM

## 2023-03-06 RX ORDER — ALBUTEROL SULFATE 2.5 MG/3ML
2.5 SOLUTION RESPIRATORY (INHALATION) EVERY 6 HOURS PRN
Status: DISCONTINUED | OUTPATIENT
Start: 2023-03-06 | End: 2023-03-09 | Stop reason: HOSPADM

## 2023-03-06 RX ORDER — AZITHROMYCIN 500 MG/1
500 TABLET, FILM COATED ORAL EVERY 24 HOURS
Status: COMPLETED | OUTPATIENT
Start: 2023-03-06 | End: 2023-03-08

## 2023-03-06 RX ORDER — LEVALBUTEROL 1.25 MG/.5ML
1.25 SOLUTION, CONCENTRATE RESPIRATORY (INHALATION)
Status: DISCONTINUED | OUTPATIENT
Start: 2023-03-06 | End: 2023-03-09 | Stop reason: HOSPADM

## 2023-03-06 RX ORDER — FORMOTEROL FUMARATE 20 UG/2ML
20 SOLUTION RESPIRATORY (INHALATION)
Status: DISCONTINUED | OUTPATIENT
Start: 2023-03-06 | End: 2023-03-09 | Stop reason: HOSPADM

## 2023-03-06 RX ORDER — LEVALBUTEROL INHALATION SOLUTION 0.63 MG/3ML
0.63 SOLUTION RESPIRATORY (INHALATION)
Status: DISCONTINUED | OUTPATIENT
Start: 2023-03-06 | End: 2023-03-06

## 2023-03-06 RX ORDER — HYDROXYUREA 500 MG/1
500 CAPSULE ORAL DAILY
Status: DISCONTINUED | OUTPATIENT
Start: 2023-03-06 | End: 2023-03-09 | Stop reason: HOSPADM

## 2023-03-06 RX ORDER — AMLODIPINE BESYLATE 5 MG/1
10 TABLET ORAL DAILY
Status: DISCONTINUED | OUTPATIENT
Start: 2023-03-06 | End: 2023-03-09 | Stop reason: HOSPADM

## 2023-03-06 RX ORDER — CEFEPIME HYDROCHLORIDE 2 G/50ML
2000 INJECTION, SOLUTION INTRAVENOUS ONCE
Status: COMPLETED | OUTPATIENT
Start: 2023-03-06 | End: 2023-03-06

## 2023-03-06 RX ORDER — ONDANSETRON 2 MG/ML
4 INJECTION INTRAMUSCULAR; INTRAVENOUS EVERY 6 HOURS PRN
Status: DISCONTINUED | OUTPATIENT
Start: 2023-03-06 | End: 2023-03-09 | Stop reason: HOSPADM

## 2023-03-06 RX ORDER — BUDESONIDE 0.5 MG/2ML
0.5 INHALANT ORAL 2 TIMES DAILY
Status: DISCONTINUED | OUTPATIENT
Start: 2023-03-06 | End: 2023-03-09 | Stop reason: HOSPADM

## 2023-03-06 RX ADMIN — FORMOTEROL FUMARATE DIHYDRATE 20 MCG: 20 SOLUTION RESPIRATORY (INHALATION) at 19:19

## 2023-03-06 RX ADMIN — AMLODIPINE BESYLATE 10 MG: 5 TABLET ORAL at 15:04

## 2023-03-06 RX ADMIN — AZITHROMYCIN MONOHYDRATE 500 MG: 500 TABLET ORAL at 15:04

## 2023-03-06 RX ADMIN — GUAIFENESIN 600 MG: 600 TABLET ORAL at 20:35

## 2023-03-06 RX ADMIN — LEVALBUTEROL HYDROCHLORIDE 0.63 MG: 0.63 SOLUTION RESPIRATORY (INHALATION) at 15:05

## 2023-03-06 RX ADMIN — PANTOPRAZOLE SODIUM 40 MG: 40 TABLET, DELAYED RELEASE ORAL at 15:04

## 2023-03-06 RX ADMIN — GUAIFENESIN 600 MG: 600 TABLET ORAL at 15:04

## 2023-03-06 RX ADMIN — HYDROXYUREA 500 MG: 500 CAPSULE ORAL at 15:04

## 2023-03-06 RX ADMIN — CEFEPIME HYDROCHLORIDE 2000 MG: 2 INJECTION, SOLUTION INTRAVENOUS at 12:12

## 2023-03-06 RX ADMIN — LEVALBUTEROL HYDROCHLORIDE 1.25 MG: 1.25 SOLUTION, CONCENTRATE RESPIRATORY (INHALATION) at 19:19

## 2023-03-06 RX ADMIN — IPRATROPIUM BROMIDE 0.5 MG: 0.5 SOLUTION RESPIRATORY (INHALATION) at 19:19

## 2023-03-06 RX ADMIN — BUDESONIDE 0.5 MG: 0.5 INHALANT ORAL at 19:19

## 2023-03-06 RX ADMIN — MONTELUKAST 10 MG: 10 TABLET, FILM COATED ORAL at 20:34

## 2023-03-06 RX ADMIN — CEFTRIAXONE 1000 MG: 1 INJECTION, SOLUTION INTRAVENOUS at 23:24

## 2023-03-06 NOTE — ASSESSMENT & PLAN NOTE
· Follows outpatient at [address] [place] [address] with Dr Delroy Maier   · Declined BM bx previously due to age   · Continue hydroxyurea

## 2023-03-06 NOTE — ASSESSMENT & PLAN NOTE
· Baseline Hgb is 7 8-8 6  · Hgb on admission 8 7  · No recent active bleeding per patient   · Recent GIB/BRBPR during prior admission, colonoscopy had been deferred 2/2 age   · No abdominal pain  · Monitor CBC

## 2023-03-06 NOTE — ASSESSMENT & PLAN NOTE
· Follows outpatient at [address] [place] [address] with Dr Eli Russo   · Declined BM bx previously due to age   · Continue hydroxyurea

## 2023-03-06 NOTE — ED PROVIDER NOTES
History  Chief Complaint   Patient presents with   • Shortness of Breath     Pt to ED via EMS from home for shortness of breath  Pt COVID + last week  Shortness of breath began this AM, family gave 2 neb treatments at home with no improvement  EMS gave pt albuteral and duo neb in truck & pt improved per EMS  Pt reports feeling less SOB than she was at home  80 yof presents for evaluation of SOB and cough that has progressed over the last few days  Patient administered 2 duonebs at home with minimal improvement  EMS administered 1 en route with steroids with significant improvement of symptoms  Patient denies chest pain  Prior to Admission Medications   Prescriptions Last Dose Informant Patient Reported? Taking? XARELTO 20 MG tablet   Yes No   Sig: daily    albuterol (2 5 mg/3 mL) 0 083 % nebulizer solution   Yes No   Sig: Take 2 5 mg by nebulization 4 (four) times a day   albuterol (PROVENTIL HFA,VENTOLIN HFA) 90 mcg/act inhaler   Yes No   Sig: Inhale daily as needed    amLODIPine (NORVASC) 10 mg tablet   Yes No   Sig: daily   budesonide (PULMICORT) 0 5 mg/2 mL nebulizer solution   Yes No   Sig: Take 0 5 mg by nebulization 2 (two) times a day Rinse mouth after use  ferrous sulfate 325 (65 Fe) mg tablet   No No   Sig: Take 1 tablet (325 mg total) by mouth daily with breakfast Do not start before March 1, 2023     guaiFENesin (MUCINEX) 600 mg 12 hr tablet   Yes No   Sig: Take 600 mg by mouth every 12 (twelve) hours   hydroxyurea (HYDREA) 500 mg capsule   Yes No   Sig: Take by mouth daily   ipratropium-albuterol (DUO-NEB) 0 5-2 5 mg/3 mL nebulizer solution   No No   Sig: Take 3 mL by nebulization every 6 (six) hours as needed for wheezing   mometasone-formoterol (DULERA) 200-5 MCG/ACT inhaler   Yes No   Sig: Inhale 2 puffs 2 (two) times a day Rinse mouth after use    montelukast (SINGULAIR) 10 mg tablet   Yes No   Sig: Take by mouth daily at bedtime    pantoprazole (PROTONIX) 40 mg tablet   Yes No Sig: Take 40 mg by mouth daily   pantoprazole (PROTONIX) 40 mg tablet   No No   Sig: Take 1 tablet (40 mg total) by mouth daily before breakfast Do not start before 2023  potassium chloride (MICRO-K) 10 MEQ CR capsule   Yes No   Sig: Take 10 mEq by mouth 2 (two) times a day      Facility-Administered Medications: None       Past Medical History:   Diagnosis Date   • COPD (chronic obstructive pulmonary disease) ([facility] [address] )    • Hiatal hernia    • Hypertension    • Melanoma in situ of the skin [facility])     Multiple sites removed   • Pneumonia    • Pulmonary embolism (HCC)     on Xarelto   • Skin cancer        Past Surgical History:   Procedure Laterality Date   • APPENDECTOMY     • HYSTERECTOMY     • AL ESOPHAGOGASTRODUODENOSCOPY TRANSORAL DIAGNOSTIC N/A 2019    Procedure: ESOPHAGOGASTRODUODENOSCOPY (EGD); Surgeon: Tin Hand MD;  Location: [facility] OR;  Service: Gastroenterology   • SKIN CANCER EXCISION     • SKIN CANCER EXCISION     • UPPER GASTROINTESTINAL ENDOSCOPY         Family History   Problem Relation Age of Onset   • Heart disease Mother    • Diabetes Father    • Diabetes Sister    • Cancer Brother    • Colon polyps Brother    • Diabetes Brother      I have reviewed and agree with the history as documented  E-Cigarette/Vaping   • E-Cigarette Use Never User      E-Cigarette/Vaping Substances   • Nicotine No    • THC No    • CBD No    • Flavoring No    • Other No    • Unknown No      Social History     Tobacco Use   • Smoking status: Former     Types: Cigarettes     Quit date: 1970     Years since quittin 2   • Smokeless tobacco: Never   Vaping Use   • Vaping Use: Never used   Substance Use Topics   • Alcohol use: Never   • Drug use: Never       Review of Systems   Respiratory: Positive for cough, shortness of breath and wheezing  Physical Exam  Physical Exam  Vitals and nursing note reviewed  Constitutional:       Appearance: She is well-developed     HENT:      Head: Normocephalic and atraumatic  Right Ear: External ear normal       Left Ear: External ear normal       Nose: Nose normal    Eyes:      General: No scleral icterus  Cardiovascular:      Rate and Rhythm: Normal rate  Pulmonary:      Effort: Pulmonary effort is normal  No respiratory distress  Comments: Mild scattered wheezes  Abdominal:      General: There is no distension  Musculoskeletal:         General: No deformity  Normal range of motion  Cervical back: Normal range of motion  Skin:     Findings: No rash  Neurological:      General: No focal deficit present  Mental Status: She is alert and oriented to person, place, and time     Psychiatric:         Mood and Affect: Mood normal          Vital Signs  ED Triage Vitals   Temperature Pulse Respirations Blood Pressure SpO2   03/06/23 1016 03/06/23 1016 03/06/23 1016 03/06/23 1016 03/06/23 1016   98 4 °F (36 9 °C) (!) 113 22 119/56 (!) 88 %      Temp Source Heart Rate Source Patient Position - Orthostatic VS BP Location FiO2 (%)   03/06/23 1021 03/06/23 1016 03/06/23 1016 03/06/23 1016 --   Oral Monitor Sitting Left arm       Pain Score       03/06/23 1016       No Pain           Vitals:    03/06/23 1016 03/06/23 1130 03/06/23 1200   BP: 119/56 122/57 117/56   Pulse: (!) 113 98 95   Patient Position - Orthostatic VS: Sitting Lying Lying         Visual Acuity      ED Medications  Medications   guaiFENesin (MUCINEX) 12 hr tablet 600 mg (has no administration in time range)   benzonatate (TESSALON PERLES) capsule 100 mg (has no administration in time range)   cefepime (MAXIPIME) IVPB (premix in dextrose) 2,000 mg 50 mL (2,000 mg Intravenous New Bag 3/6/23 1212)       Diagnostic Studies  Results Reviewed     Procedure Component Value Units Date/Time    Legionella antigen, urine [[id number]]     Lab Status: No result Specimen: Urine, Clean Catch     Strep Pneumoniae, Urine [[id number]]     Lab Status: No result Specimen: Urine, Clean Catch MRSA culture [[id number]]     Lab Status: No result Specimen: Nares from Nose     Sputum culture and Gram stain [[id number]]     Lab Status: No result Specimen: Expectorated Sputum     HS Troponin I 2hr [[id number]]  (Normal) Collected: 03/06/23 1214    Lab Status: Final result Specimen: Blood from Arm, Left Updated: 03/06/23 1245     hs TnI 2hr 22 ng/L      Delta 2hr hsTnI 3 ng/L     POCT Blood Gas (CG8+) [[id number]]  (Abnormal) Collected: 03/06/23 1057    Lab Status: Final result Specimen: Venous Updated: 03/06/23 1228     ph, Juan Manuel ISTAT 7 424     pCO2, Juan Manuel i-STAT 35 7 mm HG      pO2, Juan Manuel i-STAT 36 0 mm HG      BE, i-STAT -1 mmol/L      HCO3, Juan Manuel i-STAT 23 3 mmol/L      CO2, i-STAT 24 mmol/L      O2 Sat, i-STAT 70 %      SODIUM, I-STAT 137 mmol/l      Potassium, i-STAT 6 3 mmol/L      Calcium, Ionized i-STAT 1 00 mmol/L      Hct, i-STAT 23 %      Hgb, i-STAT 7 8 g/dl      Glucose, i-STAT 105 mg/dl      Specimen Type VENOUS    HS Troponin I 4hr [[id number]]     Lab Status: No result Specimen: Blood     FLU/RSV/COVID - if FLU/RSV clinically relevant [[id number]]  (Normal) Collected: 03/06/23 1028    Lab Status: Final result Specimen: Nares from Nose Updated: 03/06/23 1150     SARS-CoV-2 Negative     INFLUENZA A PCR Negative     INFLUENZA B PCR Negative     RSV PCR Negative    Narrative:      FOR PEDIATRIC PATIENTS - copy/paste COVID Guidelines URL to browser: [email or web address]    SARS-CoV-2 assay is a Nucleic Acid Amplification assay intended for the  qualitative detection of nucleic acid from SARS-CoV-2 in nasopharyngeal  swabs  Results are for the presumptive identification of SARS-CoV-2 RNA  Positive results are indicative of infection with SARS-CoV-2, the virus  causing COVID-19, but do not rule out bacterial infection or co-infection  with other viruses   Laboratories within the [place] and its  territories are required to report all positive results to the appropriate  public health authorities  Negative results do not preclude SARS-CoV-2  infection and should not be used as the sole basis for treatment or other  patient management decisions  Negative results must be combined with  clinical observations, patient history, and epidemiological information  This test has not been FDA cleared or approved  This test has been authorized by FDA under an Emergency Use Authorization  (EUA)  This test is only authorized for the duration of time the  declaration that circumstances exist justifying the authorization of the  emergency use of an in vitro diagnostic tests for detection of SARS-CoV-2  virus and/or diagnosis of COVID-19 infection under section 564(b)(1) of  the Act, 21 U  S C  761KKT-3(C)(3), unless the authorization is terminated  or revoked sooner  The test has been validated but independent review by FDA  and CLIA is pending  Test performed using [employer] Gene[employer]: This RT-PCR assay targets N2,  a region unique to SARS-CoV-2  A conserved region in the E-gene was chosen  for pan-Sarbecovirus detection which includes SARS-CoV-2  According to CMS-2020-01-R, this platform meets the definition of high-throughput technology      B-Type Natriuretic Peptide(BNP) [[id number]]  (Abnormal) Collected: 03/06/23 1028    Lab Status: Final result Specimen: Blood from Arm, Left Updated: 03/06/23 1117      pg/mL     Procalcitonin [[id number]]  (Abnormal) Collected: 03/06/23 1028    Lab Status: Final result Specimen: Blood from Arm, Left Updated: 03/06/23 1111     Procalcitonin 0 69 ng/ml     HS Troponin 0hr (reflex protocol) [[id number]]  (Normal) Collected: 03/06/23 1028    Lab Status: Final result Specimen: Blood from Arm, Left Updated: 03/06/23 1108     hs TnI 0hr 19 ng/L     D-dimer, quantitative [[id number]]  (Abnormal) Collected: 03/06/23 1028    Lab Status: Final result Specimen: Blood from Arm, Left Updated: 03/06/23 1107     D-Dimer, Quant 0 90 ug/ml FEU Narrative: In the evaluation for possible pulmonary embolism, in the appropriate (Well's Score of 4 or less) patient, the age adjusted d-dimer cutoff for this patient can be calculated as:    Age x 0 01 (in ug/mL) for Age-adjusted D-dimer exclusion threshold for a patient over 50 years      Protime-INR [[id number]]  (Abnormal) Collected: 03/06/23 1028    Lab Status: Final result Specimen: Blood from Arm, Left Updated: 03/06/23 1107     Protime 42 4 seconds      INR 4 22    APTT [[id number]]  (Abnormal) Collected: 03/06/23 1028    Lab Status: Final result Specimen: Blood from Arm, Left Updated: 03/06/23 1107     PTT 55 seconds     Comprehensive metabolic panel [[id number]]  (Abnormal) Collected: 03/06/23 1028    Lab Status: Final result Specimen: Blood from Arm, Left Updated: 03/06/23 1103     Sodium 137 mmol/L      Potassium 4 0 mmol/L      Chloride 106 mmol/L      CO2 23 mmol/L      ANION GAP 8 mmol/L      BUN 15 mg/dL      Creatinine 0 74 mg/dL      Glucose 107 mg/dL      Calcium 7 8 mg/dL      Corrected Calcium 8 3 mg/dL      AST 19 U/L      ALT 12 U/L      Alkaline Phosphatase 82 U/L      Total Protein 5 7 g/dL      Albumin 3 4 g/dL      Total Bilirubin 0 51 mg/dL      eGFR 68 ml/min/1 73sq m     Narrative:      Yeny guidelines for Chronic Kidney Disease (CKD):   •  Stage 1 with normal or high GFR (GFR > 90 mL/min/1 73 square meters)  •  Stage 2 Mild CKD (GFR = 60-89 mL/min/1 73 square meters)  •  Stage 3A Moderate CKD (GFR = 45-59 mL/min/1 73 square meters)  •  Stage 3B Moderate CKD (GFR = 30-44 mL/min/1 73 square meters)  •  Stage 4 Severe CKD (GFR = 15-29 mL/min/1 73 square meters)  •  Stage 5 End Stage CKD (GFR <15 mL/min/1 73 square meters)  Note: GFR calculation is accurate only with a steady state creatinine    Lactic acid [[id number]]  (Normal) Collected: 03/06/23 1028    Lab Status: Final result Specimen: Blood from Arm, Left Updated: 03/06/23 1103     LACTIC ACID 1 4 mmol/L     Narrative:      Result may be elevated if tourniquet was used during collection  Lipase [[id number]]  (Normal) Collected: 03/06/23 1028    Lab Status: Final result Specimen: Blood from Arm, Left Updated: 03/06/23 1103     Lipase 11 u/L     Magnesium [[id number]]  (Normal) Collected: 03/06/23 1028    Lab Status: Final result Specimen: Blood from Arm, Left Updated: 03/06/23 1103     Magnesium 2 0 mg/dL     Blood culture #2 [[id number]] Collected: 03/06/23 1028    Lab Status: In process Specimen: Blood from Arm, Left Updated: 03/06/23 1044    CBC and differential [[id number]]  (Abnormal) Collected: 03/06/23 1028    Lab Status: Final result Specimen: Blood from Arm, Left Updated: 03/06/23 1043     WBC 9 58 Thousand/uL      RBC 2 37 Million/uL      Hemoglobin 8 7 g/dL      Hematocrit 27 5 %       fL      MCH 36 7 pg      MCHC 31 6 g/dL      RDW 16 8 %      MPV 9 5 fL      Platelets 972 Thousands/uL      nRBC 0 /100 WBCs      Neutrophils Relative 84 %      Immat GRANS % 1 %      Lymphocytes Relative 6 %      Monocytes Relative 9 %      Eosinophils Relative 0 %      Basophils Relative 0 %      Neutrophils Absolute 8 02 Thousands/µL      Immature Grans Absolute 0 07 Thousand/uL      Lymphocytes Absolute 0 57 Thousands/µL      Monocytes Absolute 0 89 Thousand/µL      Eosinophils Absolute 0 00 Thousand/µL      Basophils Absolute 0 03 Thousands/µL     Blood culture #1 [[id number]] Collected: 03/06/23 1028    Lab Status: In process Specimen: Blood from Arm, Left Updated: 03/06/23 1037    UA w Reflex to Microscopic w Reflex to Culture [[id number]]     Lab Status: No result Specimen: Urine                  XR chest portable   Final Result by Suma Watson MD (03/06 1120)      Left infiltrates  The study was marked in [facility] for immediate notification  Workstation performed: [id number]                    Procedures  CriticalCare Time    Date/Time: 3/6/2023 12:48 PM  Performed by:  Jeanette Easley Marlena Prim  Authorized by: Comer Cranker, DO     Critical care provider statement:     Critical care time (minutes):  35    Critical care time was exclusive of:  Separately billable procedures and treating other patients and teaching time    Critical care was necessary to treat or prevent imminent or life-threatening deterioration of the following conditions:  Cardiac failure, circulatory failure, respiratory failure and sepsis    Critical care was time spent personally by me on the following activities:  Blood draw for specimens, obtaining history from patient or surrogate, discussions with primary provider, development of treatment plan with patient or surrogate, evaluation of patient's response to treatment, examination of patient, ordering and performing treatments and interventions, ordering and review of laboratory studies, re-evaluation of patient's condition, ordering and review of radiographic studies and review of old charts    I assumed direction of critical care for this patient from another provider in my specialty: no               ED Course                                             Medical Decision Making  80 yof with SOB, cough  Obtain cardiopulm and sepsis evaluation  Patient requiring supplemental O2 for hypoxic respiratory failure  Patient reports she was covid positive 2 months ago  Age adjusted dimer negative  Low suspicion for PE given alternative dx of pneumonia/COPD exac  Patient is also on [facility]  CXR with pneumonia  Administer cefepime  Admit to [facility]IM  COPD exacerbation ([facility] [address] ): acute illness or injury  Pneumonia: acute illness or injury  Amount and/or Complexity of Data Reviewed  Labs: ordered  Radiology: ordered  Risk  Prescription drug management  Decision regarding hospitalization            Disposition  Final diagnoses:   COPD exacerbation ([facility] [address] )   Pneumonia     Time reflects when diagnosis was documented in both MDM as applicable and the Disposition within this note     Time User Action Codes Description Comment    3/6/2023 11:52 AM Benito Hill Add [J44 1] COPD exacerbation ([address] )     3/6/2023 11:52 AM Benito Hill Add [J18 9] Pneumonia       ED Disposition     ED Disposition   Admit    Condition   Stable    Date/Time   Mon Mar 6, 2023 11:52 AM    Comment   Case was discussed with JUANPABLO and the patient's admission status was agreed to be Admission Status: inpatient status to the service of Dr Ladarius Thayer   Follow-up Information    None         Patient's Medications   Discharge Prescriptions    No medications on file       No discharge procedures on file      PDMP Review     None          ED Provider  Electronically Signed by           Gladys Waite DO  03/06/23 6844

## 2023-03-06 NOTE — ASSESSMENT & PLAN NOTE
· SIRS: tachypnea + tachycardia   · Lactic WNL  · Procal 0 69  · CXR: Left lingula and lateral left perihilar small patchy densities     · UA without infection   · BC x 2 pending   · Continue on IV ceftriaxone/azithro

## 2023-03-06 NOTE — ASSESSMENT & PLAN NOTE
· Continue home nebs   · Home inhaler switched to formulary   · Resp protocol   · No wheezing, do not suspect acute exacerbation, suspect hypoxia 2/2 PNA

## 2023-03-06 NOTE — ASSESSMENT & PLAN NOTE
· Baseline Hgb is 7 8-8 6  · Hgb on admission 8 7  · No recent active bleeding per patient   · Recent GIB/BRBPR during prior admission, colonoscopy had been deferred 2/2 age   · No abdominal pain  · Monitor CBC, Hgb stable

## 2023-03-06 NOTE — ASSESSMENT & PLAN NOTE
· Presented for SOB + cough x several days, no relief with home nebs  Meeting sepsis  Placed on 2L on admission  · CXR: Left lingula and lateral left perihilar small patchy densities     · IV cefepime in ED 3/6 --> continue on IV ceftriaxone & azithromycin  · Urinary antigens pending   · MRSA pending   · Sputum GS/C pending   · Speech eval  · Placed on 2L, monitor WBC and fevers   · Wean O2

## 2023-03-06 NOTE — ASSESSMENT & PLAN NOTE
· Continue home nebs   · Resp protocol   · No wheezing however due to tachypnea will add IV solumedrol for possible exacerbation 2/2 PNA--> wean as able

## 2023-03-06 NOTE — ASSESSMENT & PLAN NOTE
· Presented for SOB + cough x several days, no relief with home nebs  Meeting sepsis  Placed on 2L on admission  · CXR: Left lingula and lateral left perihilar small patchy densities     · IV cefepime initiated 3/6 --> non-severe CAP, DRIP 4, continue on IV ceftriaxone x 5 days + azithro x 3 days  · Urinary antigens pending   · MRSA pending   · Sputum GS/C pending   · Speech eval  · Stable on RA, monitor WBC and fevers   · Wean O2

## 2023-03-06 NOTE — H&P
[address] 3/22/1926, 80 y o  female MRN: [id number]  Unit/Bed#: ED 07 Encounter: [id number]  Primary Care Provider: SLIM Walls   Date and time admitted to hospital: 3/6/2023 10:21 AM    * Pneumonia  Assessment & Plan  · Presented for SOB + cough x several days, no relief with home nebs  Meeting sepsis  Placed on 2L on admission  · CXR: Left lingula and lateral left perihilar small patchy densities  · IV cefepime initiated 3/6 --> non-severe CAP, DRIP 4, continue on IV ceftriaxone x 5 days + azithro x 3 days  · Urinary antigens pending   · MRSA pending   · Sputum GS/C pending   · Speech eval  · Stable on RA, monitor WBC and fevers   · Wean O2     Sepsis (HCC)  Assessment & Plan  · SIRS: tachypnea + tachycardia   · Lactic WNL  · Procal 0 69  · CXR: Left lingula and lateral left perihilar small patchy densities     · UA pending   · BC x 2 pending   · PNA work up pending   · IV cefepime in ED, continue on IV ceftriaxone/azithro     Anemia  Assessment & Plan  · Baseline Hgb is 7 8-8 6  · Hgb on admission 8 7  · No recent active bleeding per patient   · Recent GIB/BRBPR during prior admission, colonoscopy had been deferred 2/2 age   · No abdominal pain  · Monitor CBC     Hypoxia  Assessment & Plan  · No O2 at baseline   · Requiring 2L on admission  · CXR with L PNA    · D dimer 0 9 (less than cut off for age adjustment + patient compliant with xarelto, do not suspect PE)  · Treat with IV abx as above     CML (chronic myelocytic leukemia) ([facility] )  Assessment & Plan  · Follows outpatient at [address] [place] [address] with Dr Dimple Henry   · Declined BM bx previously due to age   · Continue hydroxyurea      Hypertension  Assessment & Plan  · Continue amlodipine     COPD (chronic obstructive pulmonary disease) ([facility] )  Assessment & Plan  · Continue home nebs   · Home inhaler switched to formulary   · Resp protocol   · No wheezing, do not suspect acute exacerbation, suspect hypoxia 2/2 PNA     Large hiatal hernia  Assessment & Plan  · Previously noted large hiatal hernia per CXR, echo noted mild compression of LA   · Cards previously consulted during last admission --> no interventions recommended as patient is poor surgical candidate   · Continue PPI    Chronic anticoagulation  Assessment & Plan  · Hx of PE   · Continue xarelto 20 mg daily     VTE Pharmacologic Prophylaxis:   High Risk (Score >/= 5) - Pharmacological DVT Prophylaxis Ordered: rivaroxaban (Xarelto)  Sequential Compression Devices Ordered  Code Status: Level 3 - DNAR and DNI per patient and daughter   Discussion with family: Updated  (daughter) at bedside  Anticipated Length of Stay: Patient will be admitted on an inpatient basis with an anticipated length of stay of greater than 2 midnights secondary to IV abx   Total Time Spent on Date of Encounter in care of patient: 65 minutes This time was spent on one or more of the following: performing physical exam; counseling and coordination of care; obtaining or reviewing history; documenting in the medical record; reviewing/ordering tests, medications or procedures; communicating with other healthcare professionals and discussing with patient's family/caregivers  Chief Complaint: Sob and cough     History of Present Illness:  Jerad May is a 80 y o  female with a PMH of GERD, large hiatal hernia, hypertension, COPD, CML, 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, chronic anemia, history of GI bleeding, recent hospitalization 2/24 to 2/28 for syncope (suspected vasovagal/orthostatic etiology) who presents with shortness of breath and cough worsening over the past several days  Patient lives with son, daughter at bedside notes patient has been coughing and intermittent shortness of breath since discharge on 2/28 however this has been acutely worse over the past 2 to 3 days  Patient did have diarrhea yesterday, she denies seeing any blood in stool    Patient denies any subjective fever/chills, chest pain, palpitations, nausea/vomiting/abdominal pain, urinary symptoms, additional complaints  On admission patient requiring 2 L nasal cannula, does not use O2 at baseline  Patient/daughter deny choking with eating  CXR reveals left-sided pneumonia  Review of Systems:  Review of Systems   Constitutional: Positive for activity change and fatigue  Negative for chills and fever  HENT: Negative for congestion, rhinorrhea and sore throat  Eyes: Negative for visual disturbance  Respiratory: Positive for cough, chest tightness, shortness of breath and wheezing  Cardiovascular: Negative for chest pain, palpitations and leg swelling  Gastrointestinal: Positive for diarrhea  Negative for abdominal pain, constipation, nausea and vomiting  Genitourinary: Negative for difficulty urinating, dysuria, frequency and urgency  Musculoskeletal: Negative for arthralgias, back pain and myalgias  Skin: Negative for rash and wound  Neurological: Negative for dizziness, light-headedness and headaches  All other systems reviewed and are negative  Past Medical and Surgical History:   Past Medical History:   Diagnosis Date   • COPD (chronic obstructive pulmonary disease) ([facility] )    • Hiatal hernia    • Hypertension    • Melanoma in situ of the skin [facility])     Multiple sites removed   • Pneumonia    • Pulmonary embolism ([facility] )     on Xarelto   • Skin cancer        Past Surgical History:   Procedure Laterality Date   • APPENDECTOMY     • HYSTERECTOMY     • IL ESOPHAGOGASTRODUODENOSCOPY TRANSORAL DIAGNOSTIC N/A 5/1/2019    Procedure: ESOPHAGOGASTRODUODENOSCOPY (EGD); Surgeon: Jada Cox MD;  Location: [facility];  Service: Gastroenterology   • SKIN CANCER EXCISION     • SKIN CANCER EXCISION     • UPPER GASTROINTESTINAL ENDOSCOPY         Meds/Allergies:  Prior to Admission medications    Medication Sig Start Date End Date Taking?  Authorizing Provider   albuterol (2 5 mg/3 mL) 0 083 % nebulizer solution Take 2 5 mg by nebulization 4 (four) times a day 1/10/19   Historical Provider, MD   albuterol (PROVENTIL HFA,VENTOLIN HFA) 90 mcg/act inhaler Inhale daily as needed     Historical Provider, MD   amLODIPine (NORVASC) 10 mg tablet daily 4/8/19   Historical Provider, MD   budesonide (PULMICORT) 0 5 mg/2 mL nebulizer solution Take 0 5 mg by nebulization 2 (two) times a day Rinse mouth after use  Historical Provider, MD   ferrous sulfate 325 (65 Fe) mg tablet Take 1 tablet (325 mg total) by mouth daily with breakfast Do not start before March 1, 2023  3/1/23   Bev JUNG PA-C   guaiFENesin (MUCINEX) 600 mg 12 hr tablet Take 600 mg by mouth every 12 (twelve) hours    Historical Provider, MD   hydroxyurea (HYDREA) 500 mg capsule Take by mouth daily    Historical Provider, MD   ipratropium-albuterol (DUO-NEB) 0 5-2 5 mg/3 mL nebulizer solution Take 3 mL by nebulization every 6 (six) hours as needed for wheezing 2/28/23   Bev JUNG PA-C   mometasone-formoterol (DULERA) 200-5 MCG/ACT inhaler Inhale 2 puffs 2 (two) times a day Rinse mouth after use  Historical Provider, MD   montelukast (SINGULAIR) 10 mg tablet Take by mouth daily at bedtime     Historical Provider, MD   pantoprazole (PROTONIX) 40 mg tablet Take 40 mg by mouth daily    Historical Provider, MD   pantoprazole (PROTONIX) 40 mg tablet Take 1 tablet (40 mg total) by mouth daily before breakfast Do not start before March 1, 2023  3/1/23   Bev JUNG PA-C   potassium chloride (MICRO-K) 10 MEQ CR capsule Take 10 mEq by mouth 2 (two) times a day    Historical Provider, MD Shelly Neri 20 MG tablet daily  3/21/19   Historical Provider, MD PHAN have reviewed home medications with patient personally  Allergies: No Known Allergies    Social History:  Marital Status:     Occupation: none   Patient Pre-hospital Living Situation: Home, With other family member: son   Patient Pre-hospital Level of Mobility: walks with cane  Patient Pre-hospital Diet Restrictions: none   Substance Use History:   Social History     Substance and Sexual Activity   Alcohol Use Never     Social History     Tobacco Use   Smoking Status Former   • Types: Cigarettes   • Quit date:    • Years since quittin 2   Smokeless Tobacco Never     Social History     Substance and Sexual Activity   Drug Use Never       Family History:  Family History   Problem Relation Age of Onset   • Heart disease Mother    • Diabetes Father    • Diabetes Sister    • Cancer Brother    • Colon polyps Brother    • Diabetes Brother        Physical Exam:     Vitals:   Blood Pressure: 118/58 (23 1504)  Pulse: 91 (23 1500)  Temperature: 99 °F (37 2 °C) (23 1021)  Temp Source: Oral (23 1400)  Respirations: (!) 27 (23 1500)  Height: 4' 10" (147 3 cm) (23 1016)  Weight - Scale: 57 2 kg (126 lb) (23 1016)  SpO2: 95 % (23 1500)    Physical Exam  Vitals and nursing note reviewed  Constitutional:       General: She is not in acute distress  Appearance: She is well-developed  She is ill-appearing  HENT:      Head: Normocephalic and atraumatic  Eyes:      General:         Right eye: No discharge  Left eye: No discharge  Conjunctiva/sclera: Conjunctivae normal    Cardiovascular:      Rate and Rhythm: Normal rate and regular rhythm  Heart sounds: No murmur heard  Pulmonary:      Effort: Pulmonary effort is normal  No respiratory distress  Breath sounds: Rhonchi present  No wheezing or rales  Comments: 94% on 2L however tachypneic  Rhonchi bilaterally  Abdominal:      General: Bowel sounds are normal  There is no distension  Palpations: Abdomen is soft  Tenderness: There is no abdominal tenderness  Musculoskeletal:      Cervical back: Neck supple  Right lower leg: No edema  Left lower leg: No edema  Skin:     General: Skin is warm and dry        Capillary Refill: Capillary refill takes less than 2 seconds  Neurological:      Mental Status: She is alert and oriented to person, place, and time  Psychiatric:         Mood and Affect: Mood normal          Behavior: Behavior normal           Additional Data:     Lab Results:  Results from last 7 days   Lab Units 03/06/23  1057 03/06/23  1028   WBC Thousand/uL  --  9 58   HEMOGLOBIN g/dL  --  8 7*   I STAT HEMOGLOBIN g/dl 7 8*  --    HEMATOCRIT %  --  27 5*   HEMATOCRIT, ISTAT % 23*  --    PLATELETS Thousands/uL  --  320   NEUTROS PCT %  --  84*   LYMPHS PCT %  --  6*   MONOS PCT %  --  9   EOS PCT %  --  0     Results from last 7 days   Lab Units 03/06/23  1057 03/06/23  1028   SODIUM mmol/L  --  137   POTASSIUM mmol/L  --  4 0   CHLORIDE mmol/L  --  106   CO2 mmol/L  --  23   CO2, I-STAT mmol/L 24  --    BUN mg/dL  --  15   CREATININE mg/dL  --  0 74   ANION GAP mmol/L  --  8   CALCIUM mg/dL  --  7 8*   ALBUMIN g/dL  --  3 4*   TOTAL BILIRUBIN mg/dL  --  0 51   ALK PHOS U/L  --  82   ALT U/L  --  12   AST U/L  --  19   GLUCOSE RANDOM mg/dL  --  107     Results from last 7 days   Lab Units 03/06/23  1028   INR  4 22*             Results from last 7 days   Lab Units 03/06/23  1028   LACTIC ACID mmol/L 1 4   PROCALCITONIN ng/ml 0 69*       Lines/Drains:  Invasive Devices     Peripheral Intravenous Line  Duration           Peripheral IV 02/25/23 Dorsal (posterior); Left;Proximal Forearm 9 days                    Imaging: Reviewed radiology reports from this admission including: chest xray  XR chest portable   Final Result by Lotus Ma MD (03/06 1120)      Left infiltrates  The study was marked in [facility] for immediate notification  Workstation performed: [id number]             EKG and Other Studies Reviewed on Admission:   · EKG: Sinus Tachycardia    ** Please Note: This note has been constructed using a voice recognition system   **

## 2023-03-06 NOTE — ASSESSMENT & PLAN NOTE
· No O2 at baseline   · Requiring 2L on admission  · CXR with L PNA    · D dimer 0 9 (less than cut off for age adjustment + patient compliant with xarelto, do not suspect PE)  · Treat with IV abx & IV steroids as above   · Suspect large hiatal hernia also contributing

## 2023-03-06 NOTE — ASSESSMENT & PLAN NOTE
· SIRS: tachypnea + tachycardia   · Lactic WNL  · Procal 0 69  · CXR: Left lingula and lateral left perihilar small patchy densities     · UA pending   · BC x 2 pending   · PNA work up pending   · IV cefepime in ED, continue on IV ceftriaxone/azithro

## 2023-03-06 NOTE — RESPIRATORY THERAPY NOTE
RT Protocol Note  Yaron Patel 80 y o  female MRN: [id number]  Unit/Bed#: -01 Encounter: [id number]    Assessment    Principal Problem:    Pneumonia  Active Problems:    Chronic anticoagulation    Large hiatal hernia    Anemia    COPD (chronic obstructive pulmonary disease) (HCC)    Hypertension    CML (chronic myelocytic leukemia) (HCC)    Sepsis ([facility] )    Hypoxia      Home Pulmonary Medications:  Albuterol neb/ MDI, Pulmicort, Duoneb, Dulera, Singulair       Past Medical History:   Diagnosis Date    COPD (chronic obstructive pulmonary disease) ([facility] )     Hiatal hernia     Hypertension     Melanoma in situ of the skin ([place] [id number] )     Multiple sites removed    Pneumonia     Pulmonary embolism ([place] [id number] )     on Xarelto    Skin cancer      Social History     Socioeconomic History    Marital status:      Spouse name: None    Number of children: None    Years of education: None    Highest education level: None   Occupational History    None   Tobacco Use    Smoking status: Former     Types: Cigarettes     Quit date: 1970     Years since quittin 2    Smokeless tobacco: Never   Vaping Use    Vaping Use: Never used   Substance and Sexual Activity    Alcohol use: Never    Drug use: Never    Sexual activity: None   Other Topics Concern    None   Social History Narrative    None     Social Determinants of Health     Financial Resource Strain: Not on file   Food Insecurity: No Food Insecurity    Worried About Running Out of Food in the Last Year: Never true    Ran Out of Food in the Last Year: Never true   Transportation Needs: No Transportation Needs    Lack of Transportation (Medical): No    Lack of Transportation (Non-Medical):  No   Physical Activity: Not on file   Stress: Not on file   Social Connections: Not on file   Intimate Partner Violence: Not on file   Housing Stability: Low Risk     Unable to Pay for Housing in the Last Year: No    Number of Places Lived in the Last Year: 1    Unstable Housing in the Last Year: No       Subjective         Objective    Physical Exam:   Assessment Type: Assess only  General Appearance: Awake, Alert  Respiratory Pattern: Tachypneic  Chest Assessment: Chest expansion symmetrical  Bilateral Breath Sounds: Diminished  Cough: Non-productive, Weak  O2 Device: 2L NC    Vitals:  Blood pressure (!) 112/48, pulse 89, temperature 99 5 °F (37 5 °C), resp  rate (!) 28, height 4' 10" (1 473 m), weight 57 2 kg (126 lb), SpO2 91 %  Imaging and other studies: I have personally reviewed pertinent reports        O2 Device: 2L NC     Plan    Respiratory Plan: Mild Distress pathway  Airway Clearance Plan: Flutter

## 2023-03-06 NOTE — PLAN OF CARE
Problem: MOBILITY - ADULT  Goal: Maintain or return to baseline ADL function  Description: INTERVENTIONS:  -  Assess patient's ability to carry out ADLs; assess patient's baseline for ADL function and identify physical deficits which impact ability to perform ADLs (bathing, care of mouth/teeth, toileting, grooming, dressing, etc )  - Assess/evaluate cause of self-care deficits   - Assess range of motion  - Assess patient's mobility; develop plan if impaired  - Assess patient's need for assistive devices and provide as appropriate  - Encourage maximum independence but intervene and supervise when necessary  - Involve family in performance of ADLs  - Assess for home care needs following discharge   - Consider OT consult to assist with ADL evaluation and planning for discharge  - Provide patient education as appropriate  Outcome: Progressing  Goal: Maintains/Returns to pre admission functional level  Description: INTERVENTIONS:  - Perform BMAT or MOVE assessment daily    - Set and communicate daily mobility goal to care team and patient/family/caregiver  - Collaborate with rehabilitation services on mobility goals if consulted  - Perform Range of Motion 2 times a day  - Reposition patient every 2 hours    - Dangle patient 2 times a day  - Stand patient 2 times a day  - Ambulate patient 2 times a day  - Out of bed to chair 2 times a day   - Out of bed for meals 2 times a day  - Out of bed for toileting  - Record patient progress and toleration of activity level   Outcome: Progressing     Problem: PAIN - ADULT  Goal: Verbalizes/displays adequate comfort level or baseline comfort level  Description: Interventions:  - Encourage patient to monitor pain and request assistance  - Assess pain using appropriate pain scale  - Administer analgesics based on type and severity of pain and evaluate response  - Implement non-pharmacological measures as appropriate and evaluate response  - Consider cultural and social influences on pain and pain management  - Notify physician/advanced practitioner if interventions unsuccessful or patient reports new pain  Outcome: Progressing     Problem: INFECTION - ADULT  Goal: Absence or prevention of progression during hospitalization  Description: INTERVENTIONS:  - Assess and monitor for signs and symptoms of infection  - Monitor lab/diagnostic results  - Monitor all insertion sites, i e  indwelling lines, tubes, and drains  - Monitor endotracheal if appropriate and nasal secretions for changes in amount and color  - [place] appropriate cooling/warming therapies per order  - Administer medications as ordered  - Instruct and encourage patient and family to use good hand hygiene technique  - Identify and instruct in appropriate isolation precautions for identified infection/condition  Outcome: Progressing  Goal: Absence of fever/infection during neutropenic period  Description: INTERVENTIONS:  - Monitor WBC    Outcome: Progressing     Problem: SAFETY ADULT  Goal: Maintain or return to baseline ADL function  Description: INTERVENTIONS:  -  Assess patient's ability to carry out ADLs; assess patient's baseline for ADL function and identify physical deficits which impact ability to perform ADLs (bathing, care of mouth/teeth, toileting, grooming, dressing, etc )  - Assess/evaluate cause of self-care deficits   - Assess range of motion  - Assess patient's mobility; develop plan if impaired  - Assess patient's need for assistive devices and provide as appropriate  - Encourage maximum independence but intervene and supervise when necessary  - Involve family in performance of ADLs  - Assess for home care needs following discharge   - Consider OT consult to assist with ADL evaluation and planning for discharge  - Provide patient education as appropriate  Outcome: Progressing  Goal: Maintains/Returns to pre admission functional level  Description: INTERVENTIONS:  - Perform BMAT or MOVE assessment daily    - Set and communicate daily mobility goal to care team and patient/family/caregiver  - Collaborate with rehabilitation services on mobility goals if consulted  - Perform Range of Motion 2 times a day  - Reposition patient every 2 hours    - Dangle patient 2 times a day  - Stand patient 2 times a day  - Ambulate patient 2 times a day  - Out of bed to chair 2 times a day   - Out of bed for meals 2 times a day  - Out of bed for toileting  - Record patient progress and toleration of activity level   Outcome: Progressing  Goal: Patient will remain free of falls  Description: INTERVENTIONS:  - Educate patient/family on patient safety including physical limitations  - Instruct patient to call for assistance with activity   - Consult OT/PT to assist with strengthening/mobility   - Keep Call bell within reach  - Keep bed low and locked with side rails adjusted as appropriate  - Keep care items and personal belongings within reach  - Initiate and maintain comfort rounds  - Make Fall Risk Sign visible to staff  - Offer Toileting every 2 Hours, in advance of need  - Initiate/Maintain bed alarm  - Obtain necessary fall risk management equipment: socks  - Apply yellow socks and bracelet for high fall risk patients  - Consider moving patient to room near nurses station  Outcome: Progressing     Problem: DISCHARGE PLANNING  Goal: Discharge to home or other facility with appropriate resources  Description: INTERVENTIONS:  - Identify barriers to discharge w/patient and caregiver  - Arrange for needed discharge resources and transportation as appropriate  - Identify discharge learning needs (meds, wound care, etc )  - Arrange for interpretive services to assist at discharge as needed  - Refer to Case Management Department for coordinating discharge planning if the patient needs post-hospital services based on physician/advanced practitioner order or complex needs related to functional status, cognitive ability, or social support system  Outcome: Progressing     Problem: Knowledge Deficit  Goal: Patient/family/caregiver demonstrates understanding of disease process, treatment plan, medications, and discharge instructions  Description: Complete learning assessment and assess knowledge base    Interventions:  - Provide teaching at level of understanding  - Provide teaching via preferred learning methods  Outcome: Progressing

## 2023-03-06 NOTE — ASSESSMENT & PLAN NOTE
· Previously noted large hiatal hernia per CXR, echo noted mild compression of LA   · Cards previously consulted during last admission --> no interventions recommended as patient is poor surgical candidate   · Continue PPI

## 2023-03-07 LAB
ALBUMIN SERPL BCP-MCNC: 2.8 G/DL (ref 3.5–5)
ALP SERPL-CCNC: 60 U/L (ref 34–104)
ALT SERPL W P-5'-P-CCNC: 11 U/L (ref 7–52)
AMORPH URATE CRY URNS QL MICRO: NORMAL
ANION GAP SERPL CALCULATED.3IONS-SCNC: 5 MMOL/L (ref 4–13)
AST SERPL W P-5'-P-CCNC: 16 U/L (ref 13–39)
BACTERIA UR QL AUTO: NORMAL /HPF
BASOPHILS # BLD AUTO: 0.06 THOUSANDS/ÂΜL (ref 0–0.1)
BASOPHILS NFR BLD AUTO: 1 % (ref 0–1)
BILIRUB SERPL-MCNC: 0.38 MG/DL (ref 0.2–1)
BILIRUB UR QL STRIP: NEGATIVE
BUN SERPL-MCNC: 26 MG/DL (ref 5–25)
CALCIUM ALBUM COR SERPL-MCNC: 8.8 MG/DL (ref 8.3–10.1)
CALCIUM SERPL-MCNC: 7.8 MG/DL (ref 8.4–10.2)
CHLORIDE SERPL-SCNC: 104 MMOL/L (ref 96–108)
CLARITY UR: ABNORMAL
CO2 SERPL-SCNC: 26 MMOL/L (ref 21–32)
COLOR UR: YELLOW
CREAT SERPL-MCNC: 0.94 MG/DL (ref 0.6–1.3)
EOSINOPHIL # BLD AUTO: 0 THOUSAND/ÂΜL (ref 0–0.61)
EOSINOPHIL NFR BLD AUTO: 0 % (ref 0–6)
ERYTHROCYTE [DISTWIDTH] IN BLOOD BY AUTOMATED COUNT: 16.9 % (ref 11.6–15.1)
GFR SERPL CREATININE-BSD FRML MDRD: 51 ML/MIN/1.73SQ M
GLUCOSE SERPL-MCNC: 91 MG/DL (ref 65–140)
GLUCOSE UR STRIP-MCNC: NEGATIVE MG/DL
HCT VFR BLD AUTO: 25.2 % (ref 34.8–46.1)
HGB BLD-MCNC: 7.7 G/DL (ref 11.5–15.4)
HGB UR QL STRIP.AUTO: NEGATIVE
IMM GRANULOCYTES # BLD AUTO: 0.06 THOUSAND/UL (ref 0–0.2)
IMM GRANULOCYTES NFR BLD AUTO: 1 % (ref 0–2)
KETONES UR STRIP-MCNC: ABNORMAL MG/DL
L PNEUMO1 AG UR QL IA.RAPID: NEGATIVE
LEUKOCYTE ESTERASE UR QL STRIP: NEGATIVE
LYMPHOCYTES # BLD AUTO: 0.97 THOUSANDS/ÂΜL (ref 0.6–4.47)
LYMPHOCYTES NFR BLD AUTO: 8 % (ref 14–44)
MCH RBC QN AUTO: 36 PG (ref 26.8–34.3)
MCHC RBC AUTO-ENTMCNC: 30.6 G/DL (ref 31.4–37.4)
MCV RBC AUTO: 118 FL (ref 82–98)
MONOCYTES # BLD AUTO: 0.66 THOUSAND/ÂΜL (ref 0.17–1.22)
MONOCYTES NFR BLD AUTO: 6 % (ref 4–12)
MUCOUS THREADS UR QL AUTO: NORMAL
NEUTROPHILS # BLD AUTO: 10.2 THOUSANDS/ÂΜL (ref 1.85–7.62)
NEUTS SEG NFR BLD AUTO: 84 % (ref 43–75)
NITRITE UR QL STRIP: NEGATIVE
NON-SQ EPI CELLS URNS QL MICRO: NORMAL /HPF
NRBC BLD AUTO-RTO: 0 /100 WBCS
PH UR STRIP.AUTO: 5.5 [PH]
PLATELET # BLD AUTO: 253 THOUSANDS/UL (ref 149–390)
PMV BLD AUTO: 9.8 FL (ref 8.9–12.7)
POTASSIUM SERPL-SCNC: 4.6 MMOL/L (ref 3.5–5.3)
PROCALCITONIN SERPL-MCNC: 13.3 NG/ML
PROT SERPL-MCNC: 4.9 G/DL (ref 6.4–8.4)
PROT UR STRIP-MCNC: ABNORMAL MG/DL
RBC # BLD AUTO: 2.14 MILLION/UL (ref 3.81–5.12)
RBC #/AREA URNS AUTO: NORMAL /HPF
S PNEUM AG UR QL: NEGATIVE
SODIUM SERPL-SCNC: 135 MMOL/L (ref 135–147)
SP GR UR STRIP.AUTO: >=1.03 (ref 1–1.03)
UROBILINOGEN UR STRIP-ACNC: <2 MG/DL
WBC # BLD AUTO: 11.95 THOUSAND/UL (ref 4.31–10.16)
WBC #/AREA URNS AUTO: NORMAL /HPF

## 2023-03-07 RX ORDER — METHYLPREDNISOLONE SODIUM SUCCINATE 40 MG/ML
40 INJECTION, POWDER, LYOPHILIZED, FOR SOLUTION INTRAMUSCULAR; INTRAVENOUS EVERY 12 HOURS SCHEDULED
Status: DISCONTINUED | OUTPATIENT
Start: 2023-03-07 | End: 2023-03-09

## 2023-03-07 RX ADMIN — FORMOTEROL FUMARATE DIHYDRATE 20 MCG: 20 SOLUTION RESPIRATORY (INHALATION) at 19:02

## 2023-03-07 RX ADMIN — METHYLPREDNISOLONE SODIUM SUCCINATE 40 MG: 40 INJECTION, POWDER, FOR SOLUTION INTRAMUSCULAR; INTRAVENOUS at 13:46

## 2023-03-07 RX ADMIN — AZITHROMYCIN MONOHYDRATE 500 MG: 500 TABLET ORAL at 13:47

## 2023-03-07 RX ADMIN — IPRATROPIUM BROMIDE 0.5 MG: 0.5 SOLUTION RESPIRATORY (INHALATION) at 13:22

## 2023-03-07 RX ADMIN — CEFTRIAXONE 1000 MG: 1 INJECTION, SOLUTION INTRAVENOUS at 23:28

## 2023-03-07 RX ADMIN — BUDESONIDE 0.5 MG: 0.5 INHALANT ORAL at 19:02

## 2023-03-07 RX ADMIN — PANTOPRAZOLE SODIUM 40 MG: 40 TABLET, DELAYED RELEASE ORAL at 05:37

## 2023-03-07 RX ADMIN — LEVALBUTEROL HYDROCHLORIDE 1.25 MG: 1.25 SOLUTION, CONCENTRATE RESPIRATORY (INHALATION) at 13:22

## 2023-03-07 RX ADMIN — METHYLPREDNISOLONE SODIUM SUCCINATE 40 MG: 40 INJECTION, POWDER, FOR SOLUTION INTRAMUSCULAR; INTRAVENOUS at 21:40

## 2023-03-07 RX ADMIN — BUDESONIDE 0.5 MG: 0.5 INHALANT ORAL at 07:13

## 2023-03-07 RX ADMIN — HYDROXYUREA 500 MG: 500 CAPSULE ORAL at 09:01

## 2023-03-07 RX ADMIN — FERROUS SULFATE TAB 325 MG (65 MG ELEMENTAL FE) 325 MG: 325 (65 FE) TAB at 09:01

## 2023-03-07 RX ADMIN — LEVALBUTEROL HYDROCHLORIDE 1.25 MG: 1.25 SOLUTION, CONCENTRATE RESPIRATORY (INHALATION) at 19:02

## 2023-03-07 RX ADMIN — FORMOTEROL FUMARATE DIHYDRATE 20 MCG: 20 SOLUTION RESPIRATORY (INHALATION) at 07:13

## 2023-03-07 RX ADMIN — RIVAROXABAN 20 MG: 20 TABLET, FILM COATED ORAL at 09:04

## 2023-03-07 RX ADMIN — GUAIFENESIN 600 MG: 600 TABLET ORAL at 09:01

## 2023-03-07 RX ADMIN — IPRATROPIUM BROMIDE 0.5 MG: 0.5 SOLUTION RESPIRATORY (INHALATION) at 07:13

## 2023-03-07 RX ADMIN — GUAIFENESIN 600 MG: 600 TABLET ORAL at 21:38

## 2023-03-07 RX ADMIN — LEVALBUTEROL HYDROCHLORIDE 1.25 MG: 1.25 SOLUTION, CONCENTRATE RESPIRATORY (INHALATION) at 07:13

## 2023-03-07 RX ADMIN — MONTELUKAST 10 MG: 10 TABLET, FILM COATED ORAL at 21:38

## 2023-03-07 RX ADMIN — IPRATROPIUM BROMIDE 0.5 MG: 0.5 SOLUTION RESPIRATORY (INHALATION) at 19:02

## 2023-03-07 NOTE — PROGRESS NOTES
[place]  Progress Note - Andres Arias 3/22/1926, 80 y o  female MRN: [id number]  Unit/Bed#: -01 Encounter: [id number]  Primary Care Provider: SLIM Quan   Date and time admitted to hospital: 3/6/2023 10:21 AM    * Pneumonia  Assessment & Plan  · Presented for SOB + cough x several days, no relief with home nebs  Meeting sepsis  Placed on 2L on admission  · CXR: Left lingula and lateral left perihilar small patchy densities  · IV cefepime in ED 3/6 --> continue on IV ceftriaxone & azithromycin  · Urinary antigens pending   · MRSA pending   · Sputum GS/C pending   · Speech eval  · Placed on 2L, monitor WBC and fevers   · Wean O2     Sepsis ([facility] )  Assessment & Plan  · SIRS: tachypnea + tachycardia   · Lactic WNL  · Procal 0 69  · CXR: Left lingula and lateral left perihilar small patchy densities     · UA without infection   · BC x 2 pending   · Continue on IV ceftriaxone/azithro     Anemia  Assessment & Plan  · Baseline Hgb is 7 8-8 6  · Hgb on admission 8 7  · No recent active bleeding per patient   · Recent GIB/BRBPR during prior admission, colonoscopy had been deferred 2/2 age   · No abdominal pain  · Monitor CBC, Hgb stable     Hypoxia  Assessment & Plan  · No O2 at baseline   · Requiring 2L on admission  · CXR with L PNA    · D dimer 0 9 (less than cut off for age adjustment + patient compliant with xarelto, do not suspect PE)  · Treat with IV abx & IV steroids as above   · Suspect large hiatal hernia also contributing     CML (chronic myelocytic leukemia) ([facility] )  Assessment & Plan  · Follows outpatient at [address] [place] [address] with Dr Samantha Norwood   · Declined BM bx previously due to age   · Continue hydroxyurea      Hypertension  Assessment & Plan  · Continue amlodipine     COPD (chronic obstructive pulmonary disease) ([facility] )  Assessment & Plan  · Continue home nebs   · Resp protocol   · No wheezing however due to tachypnea will add IV solumedrol for possible exacerbation 2/2 PNA--> wean as able     Large hiatal hernia  Assessment & Plan  · Previously noted large hiatal hernia per CXR, echo noted mild compression of LA   · Cards previously consulted during last admission --> no interventions recommended as patient is poor surgical candidate   · Continue PPI    Chronic anticoagulation  Assessment & Plan  · Hx of PE   · Continue xarelto 20 mg daily       VTE Pharmacologic Prophylaxis:   High Risk (Score >/= 5) - Pharmacological DVT Prophylaxis Ordered: rivaroxaban (Xarelto)  Sequential Compression Devices Ordered  Patient Centered Rounds: I performed bedside rounds with nursing staff today  Discussions with Specialists or Other Care Team Provider: None     Education and Discussions with Family / Patient: Attempted to update  (son) via phone  Left voicemail  Total Time Spent on Date of Encounter in care of patient: 35 minutes This time was spent on one or more of the following: performing physical exam; counseling and coordination of care; obtaining or reviewing history; documenting in the medical record; reviewing/ordering tests, medications or procedures; communicating with other healthcare professionals and discussing with patient's family/caregivers  Current Length of Stay: 1 day(s)  Current Patient Status: Inpatient   Certification Statement: The patient will continue to require additional inpatient hospital stay due to IV medications and O2 requirements  Discharge Plan: Anticipate discharge in 48 hrs to discharge location to be determined pending rehab evaluations  Code Status: Level 3 - DNAR and DNI    Subjective:   Patient reports breathing is about the same as yesterday, no CP or palpitations       Objective:     Vitals:   Temp (24hrs), Av 3 °F (37 4 °C), Min:99 1 °F (37 3 °C), Max:99 5 °F (37 5 °C)    Temp:  [99 1 °F (37 3 °C)-99 5 °F (37 5 °C)] 99 1 °F (37 3 °C)  HR:  [77-97] 82  Resp:  [18-38] 18  BP: (101-118)/(44-70) 106/46  SpO2:  [88 %-99 %] 99 %  Body mass index is 26 72 kg/m²  Input and Output Summary (last 24 hours): Intake/Output Summary (Last 24 hours) at 3/7/2023 1202  Last data filed at 3/7/2023 3614  Gross per 24 hour   Intake 360 ml   Output 100 ml   Net 260 ml       Physical Exam:   Physical Exam  Vitals and nursing note reviewed  Constitutional:       General: She is not in acute distress  Appearance: She is well-developed  HENT:      Head: Normocephalic and atraumatic  Eyes:      General:         Right eye: No discharge  Left eye: No discharge  Conjunctiva/sclera: Conjunctivae normal    Cardiovascular:      Rate and Rhythm: Normal rate and regular rhythm  Heart sounds: No murmur heard  Pulmonary:      Effort: Pulmonary effort is normal  No respiratory distress  Breath sounds: Rhonchi present  No wheezing or rales  Comments: 92% on 2L  Appears with increased wob/tachypnea  Abdominal:      General: Bowel sounds are normal  There is no distension  Palpations: Abdomen is soft  Tenderness: There is no abdominal tenderness  Musculoskeletal:      Cervical back: Neck supple  Right lower leg: No edema  Left lower leg: No edema  Skin:     General: Skin is warm and dry  Capillary Refill: Capillary refill takes less than 2 seconds  Neurological:      Mental Status: She is alert and oriented to person, place, and time     Psychiatric:         Mood and Affect: Mood normal          Behavior: Behavior normal           Additional Data:     Labs:  Results from last 7 days   Lab Units 03/07/23  0327   WBC Thousand/uL 11 95*   HEMOGLOBIN g/dL 7 7*   HEMATOCRIT % 25 2*   PLATELETS Thousands/uL 253   NEUTROS PCT % 84*   LYMPHS PCT % 8*   MONOS PCT % 6   EOS PCT % 0     Results from last 7 days   Lab Units 03/07/23  0327   SODIUM mmol/L 135   POTASSIUM mmol/L 4 6   CHLORIDE mmol/L 104   CO2 mmol/L 26   BUN mg/dL 26*   CREATININE mg/dL 0 94   ANION GAP mmol/L 5   CALCIUM mg/dL 7 8* ALBUMIN g/dL 2 8*   TOTAL BILIRUBIN mg/dL 0 38   ALK PHOS U/L 60   ALT U/L 11   AST U/L 16   GLUCOSE RANDOM mg/dL 91     Results from last 7 days   Lab Units 03/06/23  1028   INR  4 22*             Results from last 7 days   Lab Units 03/07/23  0327 03/06/23  1028   LACTIC ACID mmol/L  --  1 4   PROCALCITONIN ng/ml 13 30* 0 69*       Lines/Drains:  Invasive Devices     Peripheral Intravenous Line  Duration           Peripheral IV 03/06/23 Left;Proximal;Ventral (anterior) Forearm 1 day                      Imaging: Reviewed radiology reports from this admission including: chest xray    Recent Cultures (last 7 days):   Results from last 7 days   Lab Units 03/07/23  0330 03/06/23  1028   BLOOD CULTURE   --  Received in Microbiology Lab  Culture in Progress  Received in Microbiology Lab  Culture in Progress     LEGIONELLA URINARY ANTIGEN  Negative  --        Last 24 Hours Medication List:   Current Facility-Administered Medications   Medication Dose Route Frequency Provider Last Rate   • acetaminophen  650 mg Oral Q6H PRN Trina Moss PA-C     • albuterol  2 5 mg Nebulization Q6H PRN Lorenzo Cowan MD     • amLODIPine  10 mg Oral Daily Trina Cason PA-C     • azithromycin  500 mg Oral Q24H Trina Cason PA-C     • benzonatate  100 mg Oral TID PRN Trina Moss PA-C     • budesonide  0 5 mg Nebulization BID Trina Cason PA-C     • cefTRIAXone  1,000 mg Intravenous Q24H Trina Cason PA-C 1,000 mg (03/06/23 7130)   • ferrous sulfate  325 mg Oral Daily With Breakfast Trina Moss PA-C     • formoterol  20 mcg Nebulization Q12H Trina Cason PA-C     • guaiFENesin  600 mg Oral Q12H [facility] Gautam Carrera     • hydroxyurea  500 mg Oral Daily Trina Moss PA-C     • ipratropium  0 5 mg Nebulization TID Lorenzo Cowan MD     • levalbuterol  1 25 mg Nebulization TID Lorenzo Cowan MD     • methylPREDNISolone sodium succinate  40 mg Intravenous Q12H [address] Ami Cason PA-C     • montelukast  10 mg Oral HS Jolene Cason PA-C     • ondansetron  4 mg Intravenous Q6H PRN Jolene Moss PA-C     • pantoprazole  40 mg Oral Daily Gautam Bermudez     • rivaroxaban  20 mg Oral Daily With Breakfast Reji Best PA-C          Today, Patient Was Seen By: Reji Best PA-C    **Please Note: This note may have been constructed using a voice recognition system  **

## 2023-03-07 NOTE — CASE MANAGEMENT
Case Management Assessment & Discharge Planning Note    Patient name Matthew Wray  Location /-92 MRN [id number]  : 3/22/1926 Date 3/7/2023       Current Admission Date: 3/6/2023  Current Admission Diagnosis:Pneumonia   Patient Active Problem List    Diagnosis Date Noted   • Pneumonia 2023   • Sepsis ([facility] ) 2023   • Hypoxia 2023   • Anemia 2023   • Pulmonary emboli ([address] ) 2023   • Essential thrombocythemia ([facility] ) 2023   • CML (chronic myelocytic leukemia) ([facility] ) 2023   • Syncope 2023   • BRBPR (bright red blood per rectum) 2023   • COPD (chronic obstructive pulmonary disease) ([facility] ) 2023   • Large hiatal hernia 2020   • Chronic GERD 04/15/2019   • Dysphagia 2019   • Gastroesophageal reflux disease 2019   • Family history of colonic polyps 2019   • Chronic anticoagulation 2019   • Hypertension 2017      LOS (days): 1  Geometric Mean LOS (GMLOS) (days): 5 00  Days to GMLOS:3 8     OBJECTIVE:  PATIENT READMITTED TO HOSPITAL  Risk of Unplanned Readmission Score: 26 84         Current admission status: Inpatient       Preferred Pharmacy:   [address] [place] [address] [address]  [address] [address]  Phone: [phone] Fax: [phone]    Primary Care Provider: SLIM Jacobo    Primary Insurance: [address]  Secondary Insurance:     ASSESSMENT:  [address], [address] - Son   Primary Phone: [phone] (Home)               Advance Directives  Does patient have a [facility]?: Yes  Does patient have Advance Directives?: Yes  Advance Directives: Living will, Power of  for health care  Primary Contact: Teresa Bowman    Readmission [employer] [facility]  30 Day Readmission: Yes  Who directed you to return to the hospital?: Family  Did you understand whom to contact if you had questions or problems?: Yes  Did you get your prescriptions before you left the hospital?: No  Reason[de-identified] Delivery service not offered  Were you able to get your prescriptions filled when you left the hospital?: Yes  Did you take your medications as prescribed?: Yes  Were you able to get to your follow-up appointments?: Yes  During previous admission, was a post-acute recommendation made?: No  Patient was readmitted due to: presents with shortness of breath and cough worsening over the past several days  Patient Information  Admitted from[de-identified] Home  Mental Status: Alert  During Assessment patient was accompanied by: Not accompanied during assessment  Assessment information provided by[de-identified] Patient  Primary Caregiver: Self  Support Systems: Son  Home entry access options   Select all that apply : Stairs  Number of steps to enter home : 3  Do the steps have railings?: No  Type of Current Residence: [place]  In the last 12 months, was there a time when you were not able to pay the mortgage or rent on time?: No  In the last 12 months, how many places have you lived?: 1  In the last 12 months, was there a time when you did not have a steady place to sleep or slept in a shelter (including now)?: No  Homeless/housing insecurity resource given?: N/A  Living Arrangements: Lives w/ Son    Activities of Daily Living Prior to Admission  Functional Status: Independent  Completes ADLs independently?: Yes  Ambulates independently?: Yes  Does patient use assisted devices?: Yes  Assisted Devices (DME) used: Carmin Severance  Does patient currently own DME?: Yes  What DME does the patient currently own?: Yaneth Jarrett  Does patient have a history of Outpatient Therapy (PT/OT)?: No  Does the patient have a history of Short-Term Rehab?: Yes (vanda rodrigues)  Does patient have a history of HHC?: Yes (unsure of agency)  Does patient currently have [facility]?: No    Patient Information Continued  Does patient have prescription coverage?: Yes  Within the past 12 months, you worried that your food would run out before you got the money to buy more : Never true  Within the past 12 months, the food you bought just didn't last and you didn't have money to get more : Never true  Food insecurity resource given?: N/A  Does patient receive dialysis treatments?: No  Does patient have a history of substance abuse?: No  Does patient have a history of Mental Health Diagnosis?: No    Means of Transportation  Means of Transport to Appts[de-identified] Drives Self  In the past 12 months, has lack of transportation kept you from medical appointments or from getting medications?: No  In the past 12 months, has lack of transportation kept you from meetings, work, or from getting things needed for daily living?: No  Was application for public transport provided?: N/A    DISCHARGE DETAILS:    Discharge planning discussed with[de-identified] patient  Freedom of Choice: Yes  Comments - Freedom of Choice: CM discussed HHC; pt is agreeable    CM contacted family/caregiver?: No- see comments (declined; reports being in contact with family)  Were Treatment Team discharge recommendations reviewed with patient/caregiver?: Yes  Did patient/caregiver verbalize understanding of patient care needs?: Yes  Were patient/caregiver advised of the risks associated with not following Treatment Team discharge recommendations?: Yes    [address]         Is the patient interested in [facility] at discharge?: Yes  [address] requested[de-identified] Nursing, Physical [address] Name[de-identified] [facility] Provider[de-identified] PCP  Home Health Services Needed[de-identified] Evaluate Functional Status and Safety, Gait/ADL Training, Strengthening/Theraputic Exercises to Improve Function, COPD Management  Homebound Criteria Met[de-identified] Requires the Assistance of Another Person for Safe Ambulation or to Leave the Home, Uses an Assist Device (i e  cane, walker, etc)  Supporting Clincal Findings[de-identified] Limited Endurance    DME Referral Provided  Referral made for DME?: No    Other Referral/Resources/Interventions Provided:  Interventions: HHC  Referral Comments: [facility] to [facility]    Treatment Team Recommendation: Home with [facility]  Discharge Destination Plan[de-identified] Home with [place] at Discharge : Family     IMM Given (Date):: 03/07/23  IMM Given to[de-identified] Patient     Additional Comments: Met with pt to discuss the role of CMa nd to discuss any help pt may need prior to dc  Pt reports her son lives with her in a ranch home with 3 KELSI  P tperformed ADL"s indptly pta, pt uses a cane and RW as needed  Pt's son is home 24/7 and provides assistance when needed  Pt has a hx of HHC but unsure of the agency  [facility]  NO hx of mental health or D&A treatment  CM discussed HHC; pt is agreeable and prefers -C

## 2023-03-07 NOTE — UTILIZATION REVIEW
Initial Clinical Review    Admission: Date/Time/Statement:   Admission Orders (From admission, onward)     Ordered        03/06/23 1157  INPATIENT ADMISSION  Once                      Orders Placed This Encounter   Procedures   • INPATIENT ADMISSION     Standing Status:   Standing     Number of Occurrences:   1     Order Specific Question:   Level of Care     Answer:   Med Surg [16]     Order Specific Question:   Estimated length of stay     Answer:   More than 2 Midnights     Order Specific Question:   Certification     Answer:   I certify that inpatient services are medically necessary for this patient for a duration of greater than two midnights  See H&P and MD Progress Notes for additional information about the patient's course of treatment  ED Arrival Information     Expected   -    Arrival   3/6/2023 10:12    Acuity   Emergent            Means of arrival   Ambulance    Escorted by   [facility]    Admission type   Emergency            Arrival complaint   sob           Chief Complaint   Patient presents with   • Shortness of Breath     Pt to ED via EMS from home for shortness of breath  Pt COVID + last week  Shortness of breath began this AM, family gave 2 neb treatments at home with no improvement  EMS gave pt albuteral and duo neb in truck & pt improved per EMS  Pt reports feeling less SOB than she was at home  Initial Presentation: 80 y o  female from home to ED via ems admitted inpatient due to  Sepsis secondary to pneumonia/Hypoxia  Presented due to cough and shortness of breath worsening last few days, today tried 2 Duonebs without effect  Ems gave steroids  On exam: hypoxic  Lungs mild scattered wheezes    Procalcitonin 0 69  D Dimer 0 90  INR 4 22 on Xarelto  Wbc 9 58   H&H 8 7/27 5 with baseline hgb 7 8- 8 6  CxR showed left infiltrates  In the ED placed on oxygen, given IV cefepime and started on azithromycin   , albuterol neb   Plan is continue oxygen, wean as able  Switch antibiotics to ceftriaxone and azithromycin  Consult speech  Follow cultures  Date: 3/7/23   Day 2:  Breathing unchanged from yesterday  On exam on oxygen 2 liters  Appears with increased work of breathing  Tachypnea  Lungs rhonchi  Wbc 11 95   H&H 7 7/25 2  Bun 26, creatinine 0 94  Continue IV ceftriaxone and azithromycin  Started on steroids  Around the clock nebs  Wean oxygen as able  3/7/23 per speech - no aspiration today  Recommend dysphagia 3 diet  Thin liquids       ED Triage Vitals   Temperature Pulse Respirations Blood Pressure SpO2   03/06/23 1016 03/06/23 1016 03/06/23 1016 03/06/23 1016 03/06/23 1016   98 4 °F (36 9 °C) (!) 113 22 119/56 (!) 88 %      Temp Source Heart Rate Source Patient Position - Orthostatic VS BP Location FiO2 (%)   03/06/23 1021 03/06/23 1016 03/06/23 1016 03/06/23 1016 --   Oral Monitor Sitting Left arm       Pain Score       03/06/23 1016       No Pain          Wt Readings from Last 1 Encounters:   03/07/23 58 kg (127 lb 13 9 oz)     Additional Vital Signs:   03/07/23 07:43:17 99 1 °F (37 3 °C) 82 18 106/46 Abnormal  66 99 % -- -- -- --   03/07/23 0714 -- 77 -- -- -- 92 % -- -- -- --   03/06/23 21:18:44 99 2 °F (37 3 °C) 87 -- 101/44 Abnormal  63 Abnormal  92 % -- -- -- --   03/06/23 1919 -- -- -- -- -- 91 % 28 2 L/min Nasal cannula --   03/06/23 1610 -- 89 28 Abnormal  -- -- 91 % 28 2 L/min Nasal cannula --   03/06/23 16:00:41 99 5 °F (37 5 °C) 90 18 112/48 Abnormal  69 88 % Abnormal  -- -- -- --   03/06/23 1504 -- -- -- 118/58 -- -- -- -- -- --   03/06/23 1500 -- 91 27 Abnormal  118/58 83 95 % -- -- -- --   03/06/23 1400 -- 94 38 Abnormal  116/55 79 93 % -- -- None (Room air) Lying   03/06/23 1300 -- 94 31 Abnormal  110/63 82 94 % -- -- None (Room air) Lying   03/06/23 1200 -- 95 28 Abnormal  117/56 80 95 % -- -- None (Room air) Lying   03/06/23 1130 -- 98 32 Abnormal  122/57 82 95 % 28 2 L/min Nasal cannula      Pertinent Labs/Diagnostic Test Results:   XR chest portable   Final Result by Christophe Das MD (03/06 1120)      Left infiltrates  The study was marked in [facility] for immediate notification                    Workstation performed: [id number]          3/6/23 ecg Sinus tachycardia  Non-specific intra-ventricular conduction block  T wave abnormality, consider lateral ischemia  Abnormal ECG  When compared with ECG of 24-FEB-2023 12:51, (unconfirmed)  Questionable change in QRS duration  Results from last 7 days   Lab Units 03/06/23  1028   SARS-COV-2  Negative     Results from last 7 days   Lab Units 03/07/23  0327 03/06/23  1057 03/06/23  1028   WBC Thousand/uL 11 95*  --  9 58   HEMOGLOBIN g/dL 7 7*  --  8 7*   I STAT HEMOGLOBIN g/dl  --  7 8*  --    HEMATOCRIT % 25 2*  --  27 5*   HEMATOCRIT, ISTAT %  --  23*  --    PLATELETS Thousands/uL 253  --  320   NEUTROS ABS Thousands/µL 10 20*  --  8 02*     Results from last 7 days   Lab Units 03/07/23  0327 03/06/23  1057 03/06/23  1028   SODIUM mmol/L 135  --  137   POTASSIUM mmol/L 4 6  --  4 0   CHLORIDE mmol/L 104  --  106   CO2 mmol/L 26  --  23   CO2, I-STAT mmol/L  --  24  --    ANION GAP mmol/L 5  --  8   BUN mg/dL 26*  --  15   CREATININE mg/dL 0 94  --  0 74   EGFR ml/min/1 73sq m 51  --  68   CALCIUM mg/dL 7 8*  --  7 8*   CALCIUM, IONIZED, ISTAT mmol/L  --  1 00*  --    MAGNESIUM mg/dL  --   --  2 0     Results from last 7 days   Lab Units 03/07/23  0327 03/06/23  1028   AST U/L 16 19   ALT U/L 11 12   ALK PHOS U/L 60 82   TOTAL PROTEIN g/dL 4 9* 5 7*   ALBUMIN g/dL 2 8* 3 4*   TOTAL BILIRUBIN mg/dL 0 38 0 51     Results from last 7 days   Lab Units 03/07/23  0327 03/06/23  1028   GLUCOSE RANDOM mg/dL 91 107     Results from last 7 days   Lab Units 03/06/23  1057   PH, JERRELL I-STAT  7 424*   PCO2, JERRELL ISTAT mm HG 35 7*   PO2, JERRELL ISTAT mm HG 36 0   HCO3, JERRELL ISTAT mmol/L 23 3*   I STAT BASE EXC mmol/L -1   I STAT O2 SAT % 70     Results from last 7 days   Lab Units 03/06/23  1214 03/06/23  1028   HS TNI 0HR ng/L  --  19   HS TNI 2HR ng/L 22  --    HSTNI D2 ng/L 3  --      Results from last 7 days   Lab Units 03/06/23  1028   D-DIMER QUANTITATIVE ug/ml FEU 0 90*     Results from last 7 days   Lab Units 03/06/23  1028   PROTIME seconds 42 4*   INR  4 22*   PTT seconds 55*     Results from last 7 days   Lab Units 03/07/23  0327 03/06/23  1028   PROCALCITONIN ng/ml 13 30* 0 69*     Results from last 7 days   Lab Units 03/06/23  1028   LACTIC ACID mmol/L 1 4     Results from last 7 days   Lab Units 03/06/23  1028   BNP pg/mL 238*     Results from last 7 days   Lab Units 03/06/23  1028   LIPASE u/L 11     Results from last 7 days   Lab Units 03/07/23  0335   CLARITY UA  Slightly Cloudy   COLOR UA  Yellow   SPEC GRAV UA  >=1 030   PH UA  5 5   GLUCOSE UA mg/dl Negative   KETONES UA mg/dl Trace*   BLOOD UA  Negative   PROTEIN UA mg/dl 30 (1+)*   NITRITE UA  Negative   BILIRUBIN UA  Negative   UROBILINOGEN UA (BE) mg/dl <2 0   LEUKOCYTES UA  Negative   WBC UA /hpf None Seen   RBC UA /hpf None Seen   BACTERIA UA /hpf None Seen   EPITHELIAL CELLS WET PREP /hpf Occasional   MUCUS THREADS  None Seen     Results from last 7 days   Lab Units 03/06/23  1028   INFLUENZA A PCR  Negative   INFLUENZA B PCR  Negative   RSV PCR  Negative     Results from last 7 days   Lab Units 03/06/23  1028   BLOOD CULTURE  Received in Microbiology Lab  Culture in Progress  Received in Microbiology Lab  Culture in Progress         ED Treatment:   Medication Administration from 03/06/2023 1012 to 03/06/2023 1551       Date/Time Order Dose Route Action Comments     03/06/2023 1212 EST cefepime (MAXIPIME) IVPB (premix in dextrose) 2,000 mg 50 mL 2,000 mg Intravenous New Bag --     03/06/2023 1504 EST guaiFENesin (MUCINEX) 12 hr tablet 600 mg 600 mg Oral Given --     03/06/2023 1504 EST amLODIPine (NORVASC) tablet 10 mg 10 mg Oral Given --     03/06/2023 1504 EST hydroxyurea (HYDREA) capsule 500 mg 500 mg Oral Given --     03/06/2023 1504 EST pantoprazole (PROTONIX) EC tablet 40 mg 40 mg Oral Given --     03/06/2023 1504 EST azithromycin (ZITHROMAX) tablet 500 mg 500 mg Oral Given --     03/06/2023 1505 EST levalbuterol (Goshen Reap) inhalation solution 0 63 mg 0 63 mg Nebulization Given --        Past Medical History:   Diagnosis Date   • COPD (chronic obstructive pulmonary disease) (HCC)    • Hiatal hernia    • Hypertension    • Melanoma in situ of the skin (HCC)     Multiple sites removed   • Pneumonia    • Pulmonary embolism (HCC)     on Xarelto   • Skin cancer      Present on Admission:  • Anemia  • CML (chronic myelocytic leukemia) (HCC)  • COPD (chronic obstructive pulmonary disease) (formerly [facility])  • Hypertension  • Large hiatal hernia  • Pneumonia  • Sepsis ([facility] )  • Hypoxia      Admitting Diagnosis: Pneumonia [J18 9]  SOB (shortness of breath) [R06 02]  COPD exacerbation ([facility] ) [J44 1]  Age/Sex: 80 y o  female  Admission Orders: 3/6/23 1157 inpatient   Scheduled Medications:  amLODIPine, 10 mg, Oral, Daily  azithromycin, 500 mg, Oral, Q24H  budesonide, 0 5 mg, Nebulization, BID  cefTRIAXone, 1,000 mg, Intravenous, Q24H  ferrous sulfate, 325 mg, Oral, Daily With Breakfast  formoterol, 20 mcg, Nebulization, Q12H  guaiFENesin, 600 mg, Oral, Q12H CONNIE  hydroxyurea, 500 mg, Oral, Daily  ipratropium, 0 5 mg, Nebulization, TID  levalbuterol, 1 25 mg, Nebulization, TID  montelukast, 10 mg, Oral, HS  pantoprazole, 40 mg, Oral, Daily  rivaroxaban, 20 mg, Oral, Daily With Breakfast    methylPREDNISolone sodium succinate (Solu-MEDROL) injection 40 mg  Dose: 40 mg  Freq: Every 12 hours scheduled Route: IV  Start: 03/07/23 1200    Continuous IV Infusions: none      PRN Meds: not used     acetaminophen, 650 mg, Oral, Q6H PRN  albuterol, 2 5 mg, Nebulization, Q6H PRN  benzonatate, 100 mg, Oral, TID PRN  ondansetron, 4 mg, Intravenous, Q6H PRN    Aspiration precautions  Oxygen 2 liters to keep sat > 92%  oscillatory positive expiratory pressure TID  Network Utilization Review Department  ATTENTION: Please call with any questions or concerns to [phone] and carefully listen to the prompts so that you are directed to the right person  All voicemails are confidential   Simran Harman all requests for admission clinical reviews, approved or denied determinations and any other requests to dedicated fax number below belonging to the campus where the patient is receiving treatment   List of dedicated fax numbers for the Facilities:  [address] DENIALS (Administrative/Medical Necessity) [phone]   [address] (Maternity/NICU/Pediatrics) [phone]   2 Maureen Lyman [phone]   [place] [address] [phone]   [address] [place] [address] [facility] [phone]   [address] [facility] 134 5 [facility] [phone]

## 2023-03-07 NOTE — OCCUPATIONAL THERAPY NOTE
Occupational Therapy Evaluation      Faina Garrison    3/7/2023    Principal Problem:    Pneumonia  Active Problems:    Chronic anticoagulation    Large hiatal hernia    Anemia    COPD (chronic obstructive pulmonary disease) ([facility] )    Hypertension    CML [facility]chronic myelocytic leukemia) ([facility] )    Sepsis ([facility] )    Hypoxia      Past Medical History:   Diagnosis Date    COPD (chronic obstructive pulmonary disease) ([facility] )     Hiatal hernia     Hypertension     Melanoma in situ of the skin ([facility] )     Multiple sites removed    Pneumonia     Pulmonary embolism ([facility] )     on Xarelto    Skin cancer        Past Surgical History:   Procedure Laterality Date    APPENDECTOMY      HYSTERECTOMY      MO ESOPHAGOGASTRODUODENOSCOPY TRANSORAL DIAGNOSTIC N/A 5/1/2019    Procedure: ESOPHAGOGASTRODUODENOSCOPY (EGD); Surgeon: Jacob Leahy MD;  Location:  MAIN OR;  Service: Gastroenterology    SKIN CANCER EXCISION      SKIN CANCER EXCISION      UPPER GASTROINTESTINAL ENDOSCOPY          03/07/23 1250   OT Last Visit   OT Visit Date 03/07/23   Note Type   Note type Evaluation   Pain Assessment   Pain Assessment Tool 0-10   Pain Score No Pain   Restrictions/Precautions   Other Precautions O2;Fall Risk; Chair Alarm; Bed Alarm  (no O2 at baseline)   Home Living   Type of [address] [place] [address] One level  (3STE)   Bathroom Shower/Tub Tub/shower unit  (plans to get a walk in)   Home Equipment Walker;Cane  (cane outside; RW occasionally inside)   Prior Function   Level of [place] Independent with ADLs; Needs assistance with ADLs; Independent with functional mobility   Lives With Son  (someone is always home with her; son is retired)   Receives Help From Family   IADLs Family/Friend/Other provides meals; Family/Friend/Other provides transportation; Family/Friend/Other provides medication management   Falls in the last 6 months 0  (" I don't think so")   Subjective   Subjective When asked why she is grabbing for furniture during functional mobility, pt states she is "making sure it's still there"   ADL   Eating Assistance 7  [address]  Andre Wolfe 94 6  Modified independent   [address]  6  Modified independent   Bed Mobility   Additional Comments Pt received OOB in chair   Transfers   Sit to Stand 6  Modified independent   Stand to Sit 6  Modified independent   Stand pivot 6  Modified independent   Additional Comments Verbal cues required for hand placement of 1st sit to stand, with good carryover throughout session   Functional Mobility   Functional Mobility 5  Supervision   Additional Comments Pt requesting to ambulate with RW  Pt more stable with RW  Edu to use RW until PT evaluation can be completed  Additional items Rolling walker   Activity Tolerance   Activity Tolerance Treatment limited secondary to medical complications (Comment)  (work of breathing noted; SPO2 stable)   Nurse Made Aware KELSEY Anderson   RUE Assessment   RUE Assessment WFL   LUE Assessment   LUE Assessment WFL   Hand Function   Gross Motor Coordination Functional   Fine Motor Coordination Functional   Cognition   Overall Cognitive Status WFL   Arousal/Participation Alert; Cooperative   Attention Within functional limits   Orientation Level Oriented X4   Memory Within functional limits   Following Commands Follows one step commands without difficulty   Assessment   Prognosis Good   Assessment Pt is a 80 y o  female seen for OT evaluation at [facility], admitted 3/6/2023 w/ Pneumonia  OT completed extensive review of pt's medical and social history  Comorbidities affecting pt's functional performance at time of assessment include: syncope, hx PE, dysphagia, hypoxia, sepsis, HTN, chronic myelocytic leukemia, COPD, anemia   Prior to admission, pt was living in [facility], 3STE with son, and was independent with ADL, son does IADL  Upon evaluation, pt presents to OT at functional baseline  The patient's raw score on the AM-PAC Daily Activity inpatient short form is 23, standardized score is 51 12, greater than 39 4  Patients at this level are likely to benefit from DC to home  Based on findings, pt is of high complexity, due to medical comorbidities  At this time, OT recommendations at time of discharge are return home with family support & use of RW, pending PT evaluation  No further acute OT needs indicated at this time - Recommend pt continue to be OOB for meals, perform self care tasks  D/C from OT caseload with above recommendations     Goals   Patient Goals none stated   Plan   OT Frequency Eval only   Recommendation   Recommendation PT consult   OT Discharge Recommendation No rehabilitation needs   Additional Comments  (S)  Return home with family support & use of RW at all times, pending PT evaluation   AM-PAC Daily Activity Inpatient   Lower Body Dressing 4   Bathing 3   Toileting 4   Upper Body Dressing 4   Grooming 4   Eating 4   Daily Activity Raw Score 23   Daily Activity Standardized Score (Calc for Raw Score >=11) 51 12   AM-PAC Applied Cognition Inpatient   Following a Speech/Presentation 4   Understanding Ordinary Conversation 4   Taking Medications 3   Remembering Where Things Are Placed or Put Away 4   Remembering List of 4-5 Errands 4   Taking Care of Complicated Tasks 3   Applied Cognition Raw Score 22   Applied Cognition Standardized Score 47 83       [employer], MS, OTR/L

## 2023-03-07 NOTE — PLAN OF CARE
Problem: DISCHARGE PLANNING  Goal: Discharge to home or other facility with appropriate resources  Description: INTERVENTIONS:  - Identify barriers to discharge w/patient and caregiver  - Arrange for needed discharge resources and transportation as appropriate  - Identify discharge learning needs (meds, wound care, etc )  - Arrange for interpretive services to assist at discharge as needed  - Refer to Case Management Department for coordinating discharge planning if the patient needs post-hospital services based on physician/advanced practitioner order or complex needs related to functional status, cognitive ability, or social support system  Outcome: Progressing     Problem: INFECTION - ADULT  Goal: Absence or prevention of progression during hospitalization  Description: INTERVENTIONS:  - Assess and monitor for signs and symptoms of infection  - Monitor lab/diagnostic results  - Monitor all insertion sites, i e  indwelling lines, tubes, and drains  - Monitor endotracheal if appropriate and nasal secretions for changes in amount and color  - [place] appropriate cooling/warming therapies per order  - Administer medications as ordered  - Instruct and encourage patient and family to use good hand hygiene technique  - Identify and instruct in appropriate isolation precautions for identified infection/condition  Outcome: Progressing  Goal: Absence of fever/infection during neutropenic period  Description: INTERVENTIONS:  - Monitor WBC    Outcome: Progressing     Problem: MOBILITY - ADULT  Goal: Maintain or return to baseline ADL function  Description: INTERVENTIONS:  -  Assess patient's ability to carry out ADLs; assess patient's baseline for ADL function and identify physical deficits which impact ability to perform ADLs (bathing, care of mouth/teeth, toileting, grooming, dressing, etc )  - Assess/evaluate cause of self-care deficits   - Assess range of motion  - Assess patient's mobility; develop plan if impaired  - Assess patient's need for assistive devices and provide as appropriate  - Encourage maximum independence but intervene and supervise when necessary  - Involve family in performance of ADLs  - Assess for home care needs following discharge   - Consider OT consult to assist with ADL evaluation and planning for discharge  - Provide patient education as appropriate  Outcome: Progressing  Goal: Maintains/Returns to pre admission functional level  Description: INTERVENTIONS:  - Perform BMAT or MOVE assessment daily    - Set and communicate daily mobility goal to care team and patient/family/caregiver  - Collaborate with rehabilitation services on mobility goals if consulted  - Perform Range of Motion 2 times a day  - Reposition patient every 2 hours    - Dangle patient 2 times a day  - Stand patient 2 times a day  - Ambulate patient 2 times a day  - Out of bed to chair 2 times a day   - Out of bed for meals 2 times a day  - Out of bed for toileting  - Record patient progress and toleration of activity level   Outcome: Progressing

## 2023-03-07 NOTE — PROGRESS NOTES
-- Patient:  -- MRN: [id number]  -- Aidin Request ID: [id number]  -- Level of care reserved: [facility]  -- Partner Reserved: [facility], [address] [place] [address] [phone]  -- Clinical needs requested:  -- Geography searched: [id number]  -- Start of Service:  -- Request sent: 4:04pm EST on 3/7/2023 by Scott Reveles  -- Partner reserved: 4:34pm EST on 3/7/2023 by Scott Reveles  -- Choice list shared: 4:33pm EST on 3/7/2023 by Scott Reveles

## 2023-03-07 NOTE — CASE MANAGEMENT
Case Management Discharge Planning Note    Patient name Shayne Rivera  Location /-40 MRN [id number]  : 3/22/1926 Date 3/7/2023       Current Admission Date: 3/6/2023  Current Admission Diagnosis:Pneumonia   Patient Active Problem List    Diagnosis Date Noted   • Pneumonia 2023   • Sepsis ([facility] ) 2023   • Hypoxia 2023   • Anemia 2023   • Pulmonary emboli ([facility] ) 2023   • Essential thrombocythemia ([facility] ) 2023   • C[facility]chronic myel[facility]) [facility] 2023   • Syncope 2023   • BRBPR (bright red blood per rectum) 2023   • COPD (chronic obstructive pulmonary disease) ([facility] ) 2023   • Large hiatal hernia 2020   • Chronic GERD 04/15/2019   • Dysphagia 2019   • Gastroesophageal reflux disease 2019   • Family history of colonic polyps 2019   • Chronic anticoagulation 2019   • Hypertension 2017      LOS (days): 1  Geometric Mean LOS (GMLOS) (days): 5 00  Days to GMLOS:3 8     OBJECTIVE:  Risk of Unplanned Readmission Score: 26 84         Current admission status: Inpatient   Preferred Pharmacy:   [address] [place] [address] [address]  [address] [address]  Phone: [phone] Fax: [phone]    Primary Care Provider: SLIM Hannah    Primary Insurance: [facility] REP  Secondary Insurance:     DISCHARGE DETAILS:    [address]         Is the patient interested in [facility] at discharge?: Yes  [address] requested[de-identified] Nursing, Physical [address] Name[de-identified]  [facility] [address] Provider[de-identified] PCP  Home Health Services Needed[de-identified] Evaluate Functional Status and Safety, Gait/ADL Training, COPD Management, Strengthening/Theraputic Exercises to Improve Function  Homebound Criteria Met[de-identified] Requires the Assistance of Another Person for Safe Ambulation or to Leave the Home, Uses an Assist Device (i e  cane, walker, etc)  Supporting Clincal Findings[de-identified] Limited Endurance    Other Referral/Resources/Interventions Provided:  Interventions: HHC  Referral Comments: Referral to [facility]    Treatment Team Recommendation: Home with [facility]  Discharge Destination Plan[de-identified] Home with [place] at Discharge : Family    Additional Comments: -HHC unable to accept due to being out of service area  Pt does not have a HHC preference  Referral sent to [facility] accepted  CM added [facility] to pt's dc instructions

## 2023-03-07 NOTE — SPEECH THERAPY NOTE
Speech Language/Pathology  Speech/Language Pathology  Assessment    Patient Name: Aries Angel  [id number] Date: 3/7/2023     Problem List  Principal Problem:    Pneumonia  Active Problems:    Chronic anticoagulation    Large hiatal hernia    Anemia    COPD (chronic obstructive pulmonary disease) ([facility] )    Hypertension    CML (chronic myelocytic leukemia) ([facility] )    Sepsis ([facility] )    Hypoxia    Past Medical History  Past Medical History:   Diagnosis Date   • COPD (chronic obstructive pulmonary disease) ([place] [address] )    • Hiatal hernia    • Hypertension    • Melanoma in situ of the skin ([place] [address] )     Multiple sites removed   • Pneumonia    • Pulmonary embolism ([place] [address] )     on Xarelto   • Skin cancer      Past Surgical History  Past Surgical History:   Procedure Laterality Date   • APPENDECTOMY     • HYSTERECTOMY     • GA ESOPHAGOGASTRODUODENOSCOPY TRANSORAL DIAGNOSTIC N/A 5/1/2019    Procedure: ESOPHAGOGASTRODUODENOSCOPY (EGD); Surgeon: Germain Locke MD;  Location: [facility] OR;  Service: Gastroenterology   • SKIN CANCER EXCISION     • SKIN CANCER EXCISION     • UPPER GASTROINTESTINAL ENDOSCOPY          Bedside Swallow Evaluation:    Summary:  Pt presented w/ s/s suggestive of mild oral dysphagia, ? At least min pharyngeal dysphagia  Complete labial seal for retrieval and containment of all materials  Mildly prolonged mastication w/ trials of puree and soft solids, mild-mod prolonged w/ hard solids  Complete bolus formation w/ min prolonged bolus transfer  Swallows suspected fairly prompt, fair hyolaryngeal elevation to palpation  Wheezing w/ productive cough present throughout evaluation, not directly timed w/ swallows or reproducible w/ subsequent trials  No overt s/s aspiration directly w/ swallows today  If concern for ongoing aspiration/no change in respiratory compromise, consider potential MBS to further assess  Rationale provided on diet modification and potential MBS, pt verbalized agreement and understanding   Education initiated on strategies to optimize swallow safety including frequent/thorough oral care and to notify medical staff if s/s of aspiration arise  Pt verbalized agreement and understanding, denies questions or concerns at this time  Recommendations:  Diet: Dysphagia 3  Liquid: Thin   Meds: As tolerated   Supervision: None   Positioning:Upright  Strategies: None   Pt to take PO/Meds only when fully alert and upright  Oral care: 3-4x daily   Aspiration precautions  Reflux precautions  Therapy Prognosis: Fair   Prognosis considerations: age, current medical status, PMH  Frequency: as able and appropriate, pending potential MBS if further respiratory concern is present     Consider consult w/:  -    Goal(s):  Pt will tolerate least restrictive diet w/out s/s aspiration or oral/pharyngeal difficulties  H&P/Admit info/ pertinent provider notes: (PMH noted above)  Kesha Matt is a 80 y o  female with a PMH of GERD, large hiatal hernia, hypertension, COPD, CML, 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, chronic anemia, history of GI bleeding, recent hospitalization 2/24 to 2/28 for syncope (suspected vasovagal/orthostatic etiology) who presents with shortness of breath and cough worsening over the past several days  Patient lives with son, daughter at bedside notes patient has been coughing and intermittent shortness of breath since discharge on 2/28 however this has been acutely worse over the past 2 to 3 days  Patient did have diarrhea yesterday, she denies seeing any blood in stool  Patient denies any subjective fever/chills, chest pain, palpitations, nausea/vomiting/abdominal pain, urinary symptoms, additional complaints  On admission patient requiring 2 L nasal cannula, does not use O2 at baseline  Patient/daughter deny choking with eating  CXR reveals left-sided pneumonia  Special Studies:  03/06/23 XR chest portable:  Left infiltrates    Previous MBS:  -    Patient's goal: "I like soup"    Did the pt report pain? No  If yes, was nursing notified/was it addressed? N/A    Reason for consult:  R/o aspiration  Determine safest and least restrictive diet  respiratory compromise  current pna    Precautions:  Aspiration  Fall      Food Allergies: None    Current Diet: Dysphagia 2/thin    Premorbid diet:  Regular/thin    O2 requirement:  [place] 2L   Social/Prior living  Home    Voice/Speech:  breathy    Follows commands:  Yes   Cognitive status: Awake and alert      Oral [facility] exam:  Dentition: Upper dentures  Lips (VII): WFL  Tongue (XII): WFL  Mandible (V): WFL  Face/oral sensation (V): WFL  Velum (X): WFL  Secretion management: WFL  Oral care: Good     Items administered:  Puree, soft solid, hard solid, thin liquids  Liquids were taken by cup      Oral stage:  Lip closure: Complete  Mastication: Mild prolonged w/ trials of puree and soft solids, mild-mod prolonged w/ hard solids  Bolus formation: Complete   Bolus control: Adequate, no anterior bolus spill   Transfer: Min prolonged   Oral residue: None   Pocketing: None     Pharyngeal stage:  Swallow promptness: Suspected fairly prompt   Laryngeal rise: Suspected fair   Wet voice: None   Throat clear: None   Cough: Wheezing and productive cough present throughout evaluation, not directly timed w/ swallows     Esophageal stage:  H/o hiatal hernia  H/o EGD    Results d/w:  Pt, nursing, physician

## 2023-03-07 NOTE — PLAN OF CARE
Problem: MOBILITY - ADULT  Goal: Maintain or return to baseline ADL function  Description: INTERVENTIONS:  -  Assess patient's ability to carry out ADLs; assess patient's baseline for ADL function and identify physical deficits which impact ability to perform ADLs (bathing, care of mouth/teeth, toileting, grooming, dressing, etc )  - Assess/evaluate cause of self-care deficits   - Assess range of motion  - Assess patient's mobility; develop plan if impaired  - Assess patient's need for assistive devices and provide as appropriate  - Encourage maximum independence but intervene and supervise when necessary  - Involve family in performance of ADLs  - Assess for home care needs following discharge   - Consider OT consult to assist with ADL evaluation and planning for discharge  - Provide patient education as appropriate  Outcome: Progressing  Goal: Maintains/Returns to pre admission functional level  Description: INTERVENTIONS:  - Perform BMAT or MOVE assessment daily    - Set and communicate daily mobility goal to care team and patient/family/caregiver  - Collaborate with rehabilitation services on mobility goals if consulted  - Perform Range of Motion 2 times a day  - Reposition patient every 2 hours    - Dangle patient 2 times a day  - Stand patient 2 times a day  - Ambulate patient 2 times a day  - Out of bed to chair 2 times a day   - Out of bed for meals 2 times a day  - Out of bed for toileting  - Record patient progress and toleration of activity level   Outcome: Progressing     Problem: PAIN - ADULT  Goal: Verbalizes/displays adequate comfort level or baseline comfort level  Description: Interventions:  - Encourage patient to monitor pain and request assistance  - Assess pain using appropriate pain scale  - Administer analgesics based on type and severity of pain and evaluate response  - Implement non-pharmacological measures as appropriate and evaluate response  - Consider cultural and social influences on pain and pain management  - Notify physician/advanced practitioner if interventions unsuccessful or patient reports new pain  Outcome: Progressing     Problem: INFECTION - ADULT  Goal: Absence or prevention of progression during hospitalization  Description: INTERVENTIONS:  - Assess and monitor for signs and symptoms of infection  - Monitor lab/diagnostic results  - Monitor all insertion sites, i e  indwelling lines, tubes, and drains  - Monitor endotracheal if appropriate and nasal secretions for changes in amount and color  - [place] appropriate cooling/warming therapies per order  - Administer medications as ordered  - Instruct and encourage patient and family to use good hand hygiene technique  - Identify and instruct in appropriate isolation precautions for identified infection/condition  Outcome: Progressing  Goal: Absence of fever/infection during neutropenic period  Description: INTERVENTIONS:  - Monitor WBC    Outcome: Progressing     Problem: SAFETY ADULT  Goal: Maintain or return to baseline ADL function  Description: INTERVENTIONS:  -  Assess patient's ability to carry out ADLs; assess patient's baseline for ADL function and identify physical deficits which impact ability to perform ADLs (bathing, care of mouth/teeth, toileting, grooming, dressing, etc )  - Assess/evaluate cause of self-care deficits   - Assess range of motion  - Assess patient's mobility; develop plan if impaired  - Assess patient's need for assistive devices and provide as appropriate  - Encourage maximum independence but intervene and supervise when necessary  - Involve family in performance of ADLs  - Assess for home care needs following discharge   - Consider OT consult to assist with ADL evaluation and planning for discharge  - Provide patient education as appropriate  Outcome: Progressing  Goal: Maintains/Returns to pre admission functional level  Description: INTERVENTIONS:  - Perform BMAT or MOVE assessment daily    - Set and communicate daily mobility goal to care team and patient/family/caregiver  - Collaborate with rehabilitation services on mobility goals if consulted  - Perform Range of Motion 2 times a day  - Reposition patient every 2 hours    - Dangle patient 2 times a day  - Stand patient 2 times a day  - Ambulate patient 2 times a day  - Out of bed to chair 2 times a day   - Out of bed for meals 2 times a day  - Out of bed for toileting  - Record patient progress and toleration of activity level   Outcome: Progressing  Goal: Patient will remain free of falls  Description: INTERVENTIONS:  - Educate patient/family on patient safety including physical limitations  - Instruct patient to call for assistance with activity   - Consult OT/PT to assist with strengthening/mobility   - Keep Call bell within reach  - Keep bed low and locked with side rails adjusted as appropriate  - Keep care items and personal belongings within reach  - Initiate and maintain comfort rounds  - Make Fall Risk Sign visible to staff  - Offer Toileting every 2 Hours, in advance of need  - Initiate/Maintain bed alarm  - Obtain necessary fall risk management equipment: socks  - Apply yellow socks and bracelet for high fall risk patients  - Consider moving patient to room near nurses station  Outcome: Progressing     Problem: DISCHARGE PLANNING  Goal: Discharge to home or other facility with appropriate resources  Description: INTERVENTIONS:  - Identify barriers to discharge w/patient and caregiver  - Arrange for needed discharge resources and transportation as appropriate  - Identify discharge learning needs (meds, wound care, etc )  - Arrange for interpretive services to assist at discharge as needed  - Refer to Case Management Department for coordinating discharge planning if the patient needs post-hospital services based on physician/advanced practitioner order or complex needs related to functional status, cognitive ability, or social support system  Outcome: Progressing     Problem: Knowledge Deficit  Goal: Patient/family/caregiver demonstrates understanding of disease process, treatment plan, medications, and discharge instructions  Description: Complete learning assessment and assess knowledge base    Interventions:  - Provide teaching at level of understanding  - Provide teaching via preferred learning methods  Outcome: Progressing     Problem: Prexisting or High Potential for Compromised Skin Integrity  Goal: Skin integrity is maintained or improved  Description: INTERVENTIONS:  - Identify patients at risk for skin breakdown  - Assess and monitor skin integrity  - Assess and monitor nutrition and hydration status  - Monitor labs   - Assess for incontinence   - Turn and reposition patient  - Assist with mobility/ambulation  - Relieve pressure over bony prominences  - Avoid friction and shearing  - Provide appropriate hygiene as needed including keeping skin clean and dry  - Evaluate need for skin moisturizer/barrier cream  - Collaborate with interdisciplinary team   - Patient/family teaching  - Consider wound care consult   Outcome: Progressing

## 2023-03-08 LAB
ALBUMIN SERPL BCP-MCNC: 2.9 G/DL (ref 3.5–5)
ALP SERPL-CCNC: 88 U/L (ref 34–104)
ALT SERPL W P-5'-P-CCNC: 12 U/L (ref 7–52)
ANION GAP SERPL CALCULATED.3IONS-SCNC: 4 MMOL/L (ref 4–13)
AST SERPL W P-5'-P-CCNC: 14 U/L (ref 13–39)
BASOPHILS # BLD MANUAL: 0 THOUSAND/UL (ref 0–0.1)
BASOPHILS NFR MAR MANUAL: 0 % (ref 0–1)
BILIRUB SERPL-MCNC: 0.25 MG/DL (ref 0.2–1)
BUN SERPL-MCNC: 26 MG/DL (ref 5–25)
CALCIUM ALBUM COR SERPL-MCNC: 9 MG/DL (ref 8.3–10.1)
CALCIUM SERPL-MCNC: 8.1 MG/DL (ref 8.4–10.2)
CHLORIDE SERPL-SCNC: 105 MMOL/L (ref 96–108)
CO2 SERPL-SCNC: 26 MMOL/L (ref 21–32)
CREAT SERPL-MCNC: 0.77 MG/DL (ref 0.6–1.3)
EOSINOPHIL # BLD MANUAL: 0 THOUSAND/UL (ref 0–0.4)
EOSINOPHIL NFR BLD MANUAL: 0 % (ref 0–6)
ERYTHROCYTE [DISTWIDTH] IN BLOOD BY AUTOMATED COUNT: 15.9 % (ref 11.6–15.1)
FERRITIN SERPL-MCNC: 93 NG/ML (ref 8–388)
GFR SERPL CREATININE-BSD FRML MDRD: 65 ML/MIN/1.73SQ M
GLUCOSE SERPL-MCNC: 227 MG/DL (ref 65–140)
HCT VFR BLD AUTO: 25.4 % (ref 34.8–46.1)
HGB BLD-MCNC: 7.9 G/DL (ref 11.5–15.4)
IRON SATN MFR SERPL: 6 % (ref 15–50)
IRON SERPL-MCNC: 12 UG/DL (ref 50–170)
LYMPHOCYTES # BLD AUTO: 0.17 THOUSAND/UL (ref 0.6–4.47)
LYMPHOCYTES # BLD AUTO: 2 % (ref 14–44)
MCH RBC QN AUTO: 35.3 PG (ref 26.8–34.3)
MCHC RBC AUTO-ENTMCNC: 31.1 G/DL (ref 31.4–37.4)
MCV RBC AUTO: 113 FL (ref 82–98)
MONOCYTES # BLD AUTO: 0.17 THOUSAND/UL (ref 0–1.22)
MONOCYTES NFR BLD: 2 % (ref 4–12)
NEUTROPHILS # BLD MANUAL: 8.15 THOUSAND/UL (ref 1.85–7.62)
NEUTS BAND NFR BLD MANUAL: 6 % (ref 0–8)
NEUTS SEG NFR BLD AUTO: 90 % (ref 43–75)
PLATELET # BLD AUTO: 234 THOUSANDS/UL (ref 149–390)
PLATELET BLD QL SMEAR: ADEQUATE
PMV BLD AUTO: 9.6 FL (ref 8.9–12.7)
POTASSIUM SERPL-SCNC: 4 MMOL/L (ref 3.5–5.3)
PROCALCITONIN SERPL-MCNC: 6.28 NG/ML
PROT SERPL-MCNC: 5.1 G/DL (ref 6.4–8.4)
RBC # BLD AUTO: 2.24 MILLION/UL (ref 3.81–5.12)
SODIUM SERPL-SCNC: 135 MMOL/L (ref 135–147)
TIBC SERPL-MCNC: 211 UG/DL (ref 250–450)
TOXIC GRANULES BLD QL SMEAR: PRESENT
WBC # BLD AUTO: 8.49 THOUSAND/UL (ref 4.31–10.16)

## 2023-03-08 RX ADMIN — HYDROXYUREA 500 MG: 500 CAPSULE ORAL at 08:53

## 2023-03-08 RX ADMIN — LEVALBUTEROL HYDROCHLORIDE 1.25 MG: 1.25 SOLUTION, CONCENTRATE RESPIRATORY (INHALATION) at 13:22

## 2023-03-08 RX ADMIN — IPRATROPIUM BROMIDE 0.5 MG: 0.5 SOLUTION RESPIRATORY (INHALATION) at 19:31

## 2023-03-08 RX ADMIN — GUAIFENESIN 600 MG: 600 TABLET ORAL at 08:52

## 2023-03-08 RX ADMIN — LEVALBUTEROL HYDROCHLORIDE 1.25 MG: 1.25 SOLUTION, CONCENTRATE RESPIRATORY (INHALATION) at 19:32

## 2023-03-08 RX ADMIN — METHYLPREDNISOLONE SODIUM SUCCINATE 40 MG: 40 INJECTION, POWDER, FOR SOLUTION INTRAMUSCULAR; INTRAVENOUS at 20:09

## 2023-03-08 RX ADMIN — AZITHROMYCIN MONOHYDRATE 500 MG: 500 TABLET ORAL at 14:18

## 2023-03-08 RX ADMIN — RIVAROXABAN 20 MG: 20 TABLET, FILM COATED ORAL at 08:53

## 2023-03-08 RX ADMIN — GUAIFENESIN 600 MG: 600 TABLET ORAL at 20:09

## 2023-03-08 RX ADMIN — BUDESONIDE 0.5 MG: 0.5 INHALANT ORAL at 07:33

## 2023-03-08 RX ADMIN — MONTELUKAST 10 MG: 10 TABLET, FILM COATED ORAL at 21:21

## 2023-03-08 RX ADMIN — IPRATROPIUM BROMIDE 0.5 MG: 0.5 SOLUTION RESPIRATORY (INHALATION) at 13:22

## 2023-03-08 RX ADMIN — LEVALBUTEROL HYDROCHLORIDE 1.25 MG: 1.25 SOLUTION, CONCENTRATE RESPIRATORY (INHALATION) at 07:33

## 2023-03-08 RX ADMIN — CEFTRIAXONE 1000 MG: 1 INJECTION, SOLUTION INTRAVENOUS at 22:48

## 2023-03-08 RX ADMIN — FORMOTEROL FUMARATE DIHYDRATE 20 MCG: 20 SOLUTION RESPIRATORY (INHALATION) at 19:31

## 2023-03-08 RX ADMIN — PANTOPRAZOLE SODIUM 40 MG: 40 TABLET, DELAYED RELEASE ORAL at 05:08

## 2023-03-08 RX ADMIN — IPRATROPIUM BROMIDE 0.5 MG: 0.5 SOLUTION RESPIRATORY (INHALATION) at 07:33

## 2023-03-08 RX ADMIN — BUDESONIDE 0.5 MG: 0.5 INHALANT ORAL at 19:31

## 2023-03-08 RX ADMIN — AMLODIPINE BESYLATE 10 MG: 5 TABLET ORAL at 08:52

## 2023-03-08 RX ADMIN — METHYLPREDNISOLONE SODIUM SUCCINATE 40 MG: 40 INJECTION, POWDER, FOR SOLUTION INTRAMUSCULAR; INTRAVENOUS at 08:53

## 2023-03-08 RX ADMIN — FORMOTEROL FUMARATE DIHYDRATE 20 MCG: 20 SOLUTION RESPIRATORY (INHALATION) at 07:33

## 2023-03-08 RX ADMIN — FERROUS SULFATE TAB 325 MG (65 MG ELEMENTAL FE) 325 MG: 325 (65 FE) TAB at 08:52

## 2023-03-08 NOTE — ASSESSMENT & PLAN NOTE
· No wheezing however IV solumedrol added 2/2 tachypnea, suspect mild exacerbation   · Continue home nebs   · Resp protocol   · IV solumedrol BID --> wean tomorrow 3/9

## 2023-03-08 NOTE — PROGRESS NOTES
[place]  Progress Note - Matthew Wray 3/22/1926, 80 y o  female MRN: [id number]  Unit/Bed#: -01 Encounter: [id number]  Primary Care Provider: SLIM Jacobo   Date and time admitted to hospital: 3/6/2023 10:21 AM    * Pneumonia  Assessment & Plan  · Presented for SOB + cough x several days, no relief with home nebs  Meeting sepsis  Placed on 2L on admission  · CXR: Left lingula and lateral left perihilar small patchy densities  · Urinary antigens negative   · MRSA pending   · Sputum GS/C pending   · IV cefepime in ED 3/6 --> continue on IV ceftriaxone & azithromycin x 3 days  · Weaned to 1L     Speech: dysphagia 3 + thins  [facility]    Sepsis ([facility] [facility] )  Assessment & Plan  · SIRS: tachypnea + tachycardia  Source is PNA  · Lactic WNL  · Procal 0 69 --> 13 --> 6  · CXR: Left lingula and lateral left perihilar small patchy densities     · UA without infection   · Initial BC 1/2 growing gram + cocci (staph epidermidis)  · Repeat BC pending   · Suspect contaminant   · Continue on IV ceftriaxone/azithro     Anemia  Assessment & Plan  Recent GIB/BRBPR during prior admission, colonoscopy had been deferred 2/2 age  · Baseline Hgb is 7 8-8 6  · Hgb on admission 8 7  · No evidence of active bleeding   · Iron panel reflect deficiency, continue PO supplement (venofer contraindicated)  · Monitor CBC, Hgb stable (7 9)    Hypoxia  Assessment & Plan  · No O2 at baseline   · Requiring 2L on admission  · CXR with L PNA    · D dimer 0 9 (less than cut off for age adjustment + patient compliant with xarelto, do not suspect PE)  · Treat with IV abx & IV steroids as above   · Suspect large hiatal hernia also contributing   · Currently on 1L     CML (chronic myelocytic leukemia) (HCC)  Assessment & Plan  · Follows outpatient at [address] [place] [address] with Dr El Courser   · Declined BM bx previously due to age   · Continue hydroxyurea      Hypertension  Assessment & Plan  · Continue amlodipine     COPD (chronic obstructive pulmonary disease) (HCC)  Assessment & Plan  · No wheezing however IV solumedrol added 2/2 tachypnea, suspect mild exacerbation   · Continue home nebs   · Resp protocol   · IV solumedrol BID --> wean tomorrow 3/9    Large hiatal hernia  Assessment & Plan  · Previously noted large hiatal hernia per CXR, echo noted mild compression of LA   · Cards previously consulted during last admission --> no interventions recommended as patient is poor surgical candidate   · Continue PPI    Chronic anticoagulation  Assessment & Plan  · Hx of PE   · Continue xarelto 20 mg daily         VTE Pharmacologic Prophylaxis:   High Risk (Score >/= 5) - Pharmacological DVT Prophylaxis Ordered: rivaroxaban (Xarelto)  Sequential Compression Devices Ordered  Patient Centered Rounds: I performed bedside rounds with nursing staff today  Discussions with Specialists or Other Care Team Provider: None     Education and Discussions with Family / Patient: Attempted to update  (son) via phone  Left voicemail  Total Time Spent on Date of Encounter in care of patient: 35 minutes This time was spent on one or more of the following: performing physical exam; counseling and coordination of care; obtaining or reviewing history; documenting in the medical record; reviewing/ordering tests, medications or procedures; communicating with other healthcare professionals and discussing with patient's family/caregivers  Current Length of Stay: 2 day(s)  Current Patient Status: Inpatient   Certification Statement: The patient will continue to require additional inpatient hospital stay due to IV meds  Discharge Plan: Anticipate discharge in 24-48 hrs to home with home services  Code Status: Level 3 - DNAR and DNI    Subjective:   Patient reports breathing is improved today, less chest tightness       Objective:     Vitals:   Temp (24hrs), Av 8 °F (36 6 °C), Min:97 5 °F (36 4 °C), Max:98 1 °F (36 7 °C)    Temp:  [97 5 °F (36 4 °C)-98 1 °F (36 7 °C)] 97 5 °F (36 4 °C)  HR:  [78-88] 78  Resp:  [18] 18  BP: (103-122)/(44-58) 122/53  SpO2:  [90 %-100 %] 100 %  Body mass index is 22 81 kg/m²  Input and Output Summary (last 24 hours): Intake/Output Summary (Last 24 hours) at 3/8/2023 1225  Last data filed at 3/7/2023 1700  Gross per 24 hour   Intake 450 ml   Output --   Net 450 ml       Physical Exam:   Physical Exam  Vitals and nursing note reviewed  Constitutional:       General: She is not in acute distress  Appearance: She is well-developed  She is not ill-appearing  HENT:      Head: Normocephalic and atraumatic  Eyes:      General:         Right eye: No discharge  Left eye: No discharge  Conjunctiva/sclera: Conjunctivae normal    Cardiovascular:      Rate and Rhythm: Normal rate and regular rhythm  Heart sounds: No murmur heard  Pulmonary:      Effort: Pulmonary effort is normal  No respiratory distress  Breath sounds: Rhonchi present  No wheezing or rales  Comments: 93% on 1L, 88% on RA  Abdominal:      General: Bowel sounds are normal  There is no distension  Palpations: Abdomen is soft  Tenderness: There is no abdominal tenderness  Musculoskeletal:      Cervical back: Neck supple  Right lower leg: No edema  Left lower leg: No edema  Skin:     General: Skin is warm and dry  Capillary Refill: Capillary refill takes less than 2 seconds  Neurological:      Mental Status: She is alert and oriented to person, place, and time     Psychiatric:         Mood and Affect: Mood normal          Behavior: Behavior normal           Additional Data:     Labs:  Results from last 7 days   Lab Units 03/08/23  0509 03/07/23  0327   WBC Thousand/uL 8 49 11 95*   HEMOGLOBIN g/dL 7 9* 7 7*   HEMATOCRIT % 25 4* 25 2*   PLATELETS Thousands/uL 234 253   BANDS PCT % 6  --    NEUTROS PCT %  --  84*   LYMPHS PCT %  --  8*   LYMPHO PCT % 2*  --    MONOS PCT %  --  6   MONO PCT % 2* --    EOS PCT % 0 0     Results from last 7 days   Lab Units 03/08/23  0509   SODIUM mmol/L 135   POTASSIUM mmol/L 4 0   CHLORIDE mmol/L 105   CO2 mmol/L 26   BUN mg/dL 26*   CREATININE mg/dL 0 77   ANION GAP mmol/L 4   CALCIUM mg/dL 8 1*   ALBUMIN g/dL 2 9*   TOTAL BILIRUBIN mg/dL 0 25   ALK PHOS U/L 88   ALT U/L 12   AST U/L 14   GLUCOSE RANDOM mg/dL 227*     Results from last 7 days   Lab Units 03/06/23  1028   INR  4 22*             Results from last 7 days   Lab Units 03/08/23  0509 03/07/23  0327 03/06/23  1028   LACTIC ACID mmol/L  --   --  1 4   PROCALCITONIN ng/ml 6 28* 13 30* 0 69*       Lines/Drains:  Invasive Devices     Peripheral Intravenous Line  Duration           Peripheral IV 03/06/23 Left;Proximal;Ventral (anterior) Forearm 2 days                      Imaging: Reviewed radiology reports from this admission including: chest xray    Recent Cultures (last 7 days):   Results from last 7 days   Lab Units 03/07/23  1500 03/07/23  1419 03/07/23  0330 03/06/23  1028   BLOOD CULTURE  Received in Microbiology Lab  Culture in Progress  Received in Microbiology Lab  Culture in Progress  --  Staphylococcus epidermidis*  No Growth at 24 hrs     GRAM STAIN RESULT   --   --   --  Gram positive cocci in clusters*   LEGIONELLA URINARY ANTIGEN   --   --  Negative  --        Last 24 Hours Medication List:   Current Facility-Administered Medications   Medication Dose Route Frequency Provider Last Rate   • acetaminophen  650 mg Oral Q6H PRN Valerie Moss PA-C     • albuterol  2 5 mg Nebulization Q6H PRN Christiano Alamo MD     • amLODIPine  10 mg Oral Daily Valerie Cason PA-C     • azithromycin  500 mg Oral Q24H Valerie Cason PA-C     • benzonatate  100 mg Oral TID PRN Valerie Moss PA-C     • budesonide  0 5 mg Nebulization BID Valerie Moss PA-C     • cefTRIAXone  1,000 mg Intravenous Q24H JADE Rebolledo-C 1,000 mg (03/07/23 5842)   • ferrous sulfate  325 mg Oral Daily With Breakfast [facility] DONNA Cason     • formoterol  20 mcg Nebulization Q12H [facility] DONNA Cason     • guaiFENesin  600 mg Oral Q12H [address] [place], [place]     • hydroxyurea  500 mg Oral Daily [facility] DONNA Moss     • ipratropium  0 5 mg Nebulization TID Bessy Hurtado MD     • levalbuterol  1 25 mg Nebulization TID Bessy Hurtado MD     • methylPREDNISolone sodium succinate  40 mg Intravenous Q12H [address] [place] Ami Cason PA-C     • montelukast  10 mg Oral HS [facility] DONNA Moss     • ondansetron  4 mg Intravenous Q6H PRN [facility] DONNA Moss     • pantoprazole  40 mg Oral Daily [place], [place]     • rivaroxaban  20 mg Oral Daily With Breakfast Luis Zaragoza PA-C          Today, Patient Was Seen By: Luis Zaragoza PA-C    **Please Note: This note may have been constructed using a voice recognition system  **

## 2023-03-08 NOTE — ASSESSMENT & PLAN NOTE
· No O2 at baseline   · Requiring 2L on admission  · CXR with L PNA    · D dimer 0 9 (less than cut off for age adjustment + patient compliant with xarelto, do not suspect PE)  · Treat with IV abx & IV steroids as above - will discharge with oral abx and steroids  · Suspect large hiatal hernia also contributing   · Ordered home O2 evaluation however patient declined stating that she does not want to go home on oxygen  RN ambulated patient and O2 saturation remained at 91% on RA at rest and with ambulation

## 2023-03-08 NOTE — ASSESSMENT & PLAN NOTE
· No wheezing however IV solumedrol added 2/2 tachypnea, suspect mild exacerbation   · Continue home nebs   · Resp protocol   · IV solumedrol BID --> weaned to oral prednisone taper today   · Improved

## 2023-03-08 NOTE — ASSESSMENT & PLAN NOTE
Recent GIB/BRBPR during prior admission, colonoscopy had been deferred 2/2 age  · Baseline Hgb is 7 8-8 6  · Hgb on admission 8 7  · No evidence of active bleeding   · Monitor CBC, Hgb stable (7 9)

## 2023-03-08 NOTE — PHYSICAL THERAPY NOTE
PT EVALUATION    80 y o     [id number]    Pneumonia [J18 9]  SOB (shortness of breath) [R06 02]  COPD exacerbation (HCC) [J44 1]    Past Medical History:   Diagnosis Date    COPD (chronic obstructive pulmonary disease) ([facility] )     Hiatal hernia     Hypertension     Melanoma in situ of the skin ([facility] )     Multiple sites removed    Pneumonia     Pulmonary embolism ([facility] )     on Xarelto    Skin cancer          Past Surgical History:   Procedure Laterality Date    APPENDECTOMY      HYSTERECTOMY      IN ESOPHAGOGASTRODUODENOSCOPY TRANSORAL DIAGNOSTIC N/A 5/1/2019    Procedure: ESOPHAGOGASTRODUODENOSCOPY (EGD); Surgeon: Sondra Cheatham MD;  Location:  MAIN OR;  Service: Gastroenterology    SKIN CANCER EXCISION      SKIN CANCER EXCISION      UPPER GASTROINTESTINAL ENDOSCOPY          03/08/23 0911   PT Last Visit   PT Visit Date 03/08/23   Note Type   Note type Evaluation   Pain Assessment   Pain Assessment Tool 0-10   Pain Score No Pain   Restrictions/Precautions   Weight Bearing Precautions Per Order No   Other Precautions Chair Alarm; Bed Alarm;O2;Fall Risk   Home Living   Type of [address] [place] [address] One level;Stairs to enter with rails  (3 KELSI)   Bathroom Shower/Tub Tub/shower unit   Bathroom Toilet Standard   Home Equipment Walker;Cane  (Pt ambulates with SPC in community and with v  without RW in home )   Prior Function   Level of [place] Independent with ADLs; Independent with functional mobility; Needs assistance with IADLS   Lives With Son  (Son is retired and is home with pt )   Receives Help From Family   IADLs Family/Friend/Other provides transportation; Family/Friend/Other provides meals; Family/Friend/Other provides medication management   Falls in the last 6 months 0   Vocational Retired   General   Family/Caregiver Present No   Cognition   Overall Cognitive Status WFL   Arousal/Participation Alert   Attention Within functional limits   Orientation Level Oriented X4   Memory Within functional limits Following Commands Follows one step commands without difficulty   Subjective   Subjective Pt agreeable to OOB and evaluation  RLE Assessment   RLE Assessment X   Strength RLE   R Hip Flexion 3+/5   R Knee Flexion 4-/5   R Knee Extension 4/5   LLE Assessment   LLE Assessment X   Strength LLE   L Hip Flexion 3+/5   L Knee Flexion 4-/5   L Knee Extension 4-/5   Bed Mobility   Supine to Sit 5  Supervision   Additional items Increased time required   Transfers   Sit to Stand 5  Supervision   Additional items Verbal cues; Increased time required   Stand to Sit 5  Supervision   Additional items Verbal cues; Increased time required   Additional Comments verbal cues for proper hand placement and safety  Ambulation/Elevation   Gait pattern Forward Flexion; Wide ANDREA; Excessively slow   Gait Assistance 5  Supervision   Additional items Verbal cues   Assistive Device Rolling walker   Distance 25' x 2   Balance   Static Sitting Fair +   Dynamic Sitting Fair   Static Standing Fair -   Dynamic Standing Poor +   Ambulatory Poor +   Endurance Deficit   Endurance Deficit Yes   Endurance Deficit Description + MAYNARD, SaO2 86-92% with activity   Activity Tolerance   Activity Tolerance Patient limited by fatigue;Treatment limited secondary to medical complications (Comment)   Nurse Made Aware yes, KELSEY Anderson   Assessment   Prognosis Fair   Problem List Decreased strength;Decreased endurance; Impaired balance;Decreased mobility; Decreased safety awareness   Assessment Pt is an 80 y o  female admitted to [address] [place] [address] on 3/6/23 with c/o SOB and cough  Pt is being treated for pneumonia  PT consulted with eval and treat and activity as tolerated orders  Pt lives with son in a [facility], 3 KELSI  At baseline, pt is independent with ADLs and ambulation with SPC v  RW  Upon evaluation, pt was at a supervision level for mobility with RW   Pt presents with weakness, impaired balance, impaired endurance, gait dysfunction and decreased safety awareness  The patient's AM-PAC Basic Mobility Inpatient Short Form Raw Score is 20  A Raw score of greater than 16 suggests the patient may benefit from discharge to home  Please also refer to the recommendation of the Physical Therapist for safe discharge planning  PT to follow 2-3x/wk during acute stay to address deficits  Recommend return home with Home PT and continued supervision for safety to maximize safe mobility and function  Barriers to Discharge None   Goals   Patient Goals to go home   STG Expiration Date 03/22/23   Short Term Goal #1 1)  Pt will perform bed mobility with Nabila demonstrating appropriate technique 100% of the time in order to improve function  2)  Perform all transfers with Nabila demonstrating safe and appropriate technique 100% of the time in order to improve ability to negotiate safely in home environment  3) Amb with least restrictive AD > 200'x1 with mod I in order to demonstrate ability to negotiate in home environment  4)  Improve overall strength and balance 1/2 grade in order to optimize ability to perform functional tasks and reduce fall risk  5) Increase activity tolerance to 45 minutes in order to improve endurance to functional tasks  6)  Negotiate stairs using most appropriate technique and S in order to be able to negotiate safely in home environment  7) PT for ongoing patient and family/caregiver education, DME needs and d/c planning in order to promote highest level of function in least restrictive environment  PT Treatment Day 0   Plan   Treatment/Interventions Functional transfer training;LE strengthening/ROM; Elevations; Therapeutic exercise; Endurance training;Cognitive reorientation;Patient/family training;Equipment eval/education; Bed mobility;Gait training;Spoke to nursing   PT Frequency 2-3x/wk   Recommendation   PT Discharge Recommendation Home with home health rehabilitation   AM-PAC Basic Mobility Inpatient   Turning in Flat Bed Without Bedrails 4   Lying on Back to Sitting on Edge of Flat Bed Without Bedrails 4   Moving Bed to Chair 3   Standing Up From Chair Using Arms 3   Walk in Room 3   Climb 3-5 Stairs With Railing 3   Basic Mobility Inpatient Raw Score 20   Basic Mobility Standardized Score 43 99   Highest Level Of Mobility   -HLM Goal 6: Walk 10 steps or more   JH-HLM Achieved 7: Walk 25 feet or more   End of Consult   Patient Position at End of Consult Bedside chair;Bed/Chair alarm activated; All needs within reach     Complexity: High  History: co-morbidities (COPD, HTN), advanced age, use of AD, stairs in home environment  Examination: impairments in strength, endurance, balance, locomotion, knowledge of safety precautions  Presentation: unstable/ unpredictable (ongoing medical management, fall risk)    Bishnu Roche, PT

## 2023-03-08 NOTE — ASSESSMENT & PLAN NOTE
· No O2 at baseline   · Requiring 2L on admission  · CXR with L PNA    · D dimer 0 9 (less than cut off for age adjustment + patient compliant with xarelto, do not suspect PE)  · Treat with IV abx & IV steroids as above   · Suspect large hiatal hernia also contributing   · Currently on 1L

## 2023-03-08 NOTE — ASSESSMENT & PLAN NOTE
· Follows outpatient at [address] [place] [address] with Dr Prince Russo   · Declined BM bx previously due to age   · Continue hydroxyurea

## 2023-03-08 NOTE — ASSESSMENT & PLAN NOTE
· Follows outpatient at [address] [place] [address] with Dr Joseph Tejada   · Declined BM bx previously due to age   · Continue hydroxyurea

## 2023-03-08 NOTE — PLAN OF CARE
Problem: PHYSICAL THERAPY ADULT  Goal: Performs mobility at highest level of function for planned discharge setting  See evaluation for individualized goals  Description: Treatment/Interventions: Functional transfer training, LE strengthening/ROM, Elevations, Therapeutic exercise, Endurance training, Cognitive reorientation, Patient/family training, Equipment eval/education, Bed mobility, Gait training, Spoke to nursing          See flowsheet documentation for full assessment, interventions and recommendations  Note: Prognosis: Fair  Problem List: Decreased strength, Decreased endurance, Impaired balance, Decreased mobility, Decreased safety awareness  Assessment: Pt is an 80 y o  female admitted to [address] [place] [address] on 3/6/23 with c/o SOB and cough  Pt is being treated for pneumonia  PT consulted with eval and treat and activity as tolerated orders  Pt lives with son in a [facility], 3 [address]  At baseline, pt is independent with ADLs and ambulation with SPC v  RW  Upon evaluation, pt was at a supervision level for mobility with RW  Pt presents with weakness, impaired balance, impaired endurance, gait dysfunction and decreased safety awareness  The patient's AM-PAC Basic Mobility Inpatient Short Form Raw Score is 20  A Raw score of greater than 16 suggests the patient may benefit from discharge to home  Please also refer to the recommendation of the Physical Therapist for safe discharge planning  PT to follow 2-3x/wk during acute stay to address deficits  Recommend return home with Home PT and continued supervision for safety to maximize safe mobility and function  Barriers to Discharge: None     PT Discharge Recommendation: Home with home health rehabilitation    See flowsheet documentation for full assessment

## 2023-03-08 NOTE — PLAN OF CARE
Problem: MOBILITY - ADULT  Goal: Maintain or return to baseline ADL function  Description: INTERVENTIONS:  -  Assess patient's ability to carry out ADLs; assess patient's baseline for ADL function and identify physical deficits which impact ability to perform ADLs (bathing, care of mouth/teeth, toileting, grooming, dressing, etc )  - Assess/evaluate cause of self-care deficits   - Assess range of motion  - Assess patient's mobility; develop plan if impaired  - Assess patient's need for assistive devices and provide as appropriate  - Encourage maximum independence but intervene and supervise when necessary  - Involve family in performance of ADLs  - Assess for home care needs following discharge   - Consider OT consult to assist with ADL evaluation and planning for discharge  - Provide patient education as appropriate  Outcome: Progressing  Goal: Maintains/Returns to pre admission functional level  Description: INTERVENTIONS:  - Perform BMAT or MOVE assessment daily    - Set and communicate daily mobility goal to care team and patient/family/caregiver  - Collaborate with rehabilitation services on mobility goals if consulted  - Perform Range of Motion 2 times a day  - Reposition patient every 2 hours    - Dangle patient 2 times a day  - Stand patient 2 times a day  - Ambulate patient 2 times a day  - Out of bed to chair 2 times a day   - Out of bed for meals 2 times a day  - Out of bed for toileting  - Record patient progress and toleration of activity level   Outcome: Progressing     Problem: INFECTION - ADULT  Goal: Absence or prevention of progression during hospitalization  Description: INTERVENTIONS:  - Assess and monitor for signs and symptoms of infection  - Monitor lab/diagnostic results  - Monitor all insertion sites, i e  indwelling lines, tubes, and drains  - Monitor endotracheal if appropriate and nasal secretions for changes in amount and color  - [place] appropriate cooling/warming therapies per order  - Administer medications as ordered  - Instruct and encourage patient and family to use good hand hygiene technique  - Identify and instruct in appropriate isolation precautions for identified infection/condition  Outcome: Progressing  Goal: Absence of fever/infection during neutropenic period  Description: INTERVENTIONS:  - Monitor WBC    Outcome: Progressing     Problem: DISCHARGE PLANNING  Goal: Discharge to home or other facility with appropriate resources  Description: INTERVENTIONS:  - Identify barriers to discharge w/patient and caregiver  - Arrange for needed discharge resources and transportation as appropriate  - Identify discharge learning needs (meds, wound care, etc )  - Arrange for interpretive services to assist at discharge as needed  - Refer to Case Management Department for coordinating discharge planning if the patient needs post-hospital services based on physician/advanced practitioner order or complex needs related to functional status, cognitive ability, or social support system  Outcome: Progressing     Problem: Knowledge Deficit  Goal: Patient/family/caregiver demonstrates understanding of disease process, treatment plan, medications, and discharge instructions  Description: Complete learning assessment and assess knowledge base    Interventions:  - Provide teaching at level of understanding  - Provide teaching via preferred learning methods  Outcome: Progressing

## 2023-03-08 NOTE — PLAN OF CARE
Problem: MOBILITY - ADULT  Goal: Maintain or return to baseline ADL function  Description: INTERVENTIONS:  -  Assess patient's ability to carry out ADLs; assess patient's baseline for ADL function and identify physical deficits which impact ability to perform ADLs (bathing, care of mouth/teeth, toileting, grooming, dressing, etc )  - Assess/evaluate cause of self-care deficits   - Assess range of motion  - Assess patient's mobility; develop plan if impaired  - Assess patient's need for assistive devices and provide as appropriate  - Encourage maximum independence but intervene and supervise when necessary  - Involve family in performance of ADLs  - Assess for home care needs following discharge   - Consider OT consult to assist with ADL evaluation and planning for discharge  - Provide patient education as appropriate  Outcome: Progressing  Goal: Maintains/Returns to pre admission functional level  Description: INTERVENTIONS:  - Perform BMAT or MOVE assessment daily    - Set and communicate daily mobility goal to care team and patient/family/caregiver  - Collaborate with rehabilitation services on mobility goals if consulted  - Perform Range of Motion 2 times a day  - Reposition patient every 2 hours    - Dangle patient 2 times a day  - Stand patient 2 times a day  - Ambulate patient 2 times a day  - Out of bed to chair 2 times a day   - Out of bed for meals 2 times a day  - Out of bed for toileting  - Record patient progress and toleration of activity level   Outcome: Progressing     Problem: PAIN - ADULT  Goal: Verbalizes/displays adequate comfort level or baseline comfort level  Description: Interventions:  - Encourage patient to monitor pain and request assistance  - Assess pain using appropriate pain scale  - Administer analgesics based on type and severity of pain and evaluate response  - Implement non-pharmacological measures as appropriate and evaluate response  - Consider cultural and social influences on pain and pain management  - Notify physician/advanced practitioner if interventions unsuccessful or patient reports new pain  Outcome: Progressing     Problem: INFECTION - ADULT  Goal: Absence or prevention of progression during hospitalization  Description: INTERVENTIONS:  - Assess and monitor for signs and symptoms of infection  - Monitor lab/diagnostic results  - Monitor all insertion sites, i e  indwelling lines, tubes, and drains  - Monitor endotracheal if appropriate and nasal secretions for changes in amount and color  - [place] appropriate cooling/warming therapies per order  - Administer medications as ordered  - Instruct and encourage patient and family to use good hand hygiene technique  - Identify and instruct in appropriate isolation precautions for identified infection/condition  Outcome: Progressing  Goal: Absence of fever/infection during neutropenic period  Description: INTERVENTIONS:  - Monitor WBC    Outcome: Progressing     Problem: SAFETY ADULT  Goal: Maintain or return to baseline ADL function  Description: INTERVENTIONS:  -  Assess patient's ability to carry out ADLs; assess patient's baseline for ADL function and identify physical deficits which impact ability to perform ADLs (bathing, care of mouth/teeth, toileting, grooming, dressing, etc )  - Assess/evaluate cause of self-care deficits   - Assess range of motion  - Assess patient's mobility; develop plan if impaired  - Assess patient's need for assistive devices and provide as appropriate  - Encourage maximum independence but intervene and supervise when necessary  - Involve family in performance of ADLs  - Assess for home care needs following discharge   - Consider OT consult to assist with ADL evaluation and planning for discharge  - Provide patient education as appropriate  Outcome: Progressing  Goal: Maintains/Returns to pre admission functional level  Description: INTERVENTIONS:  - Perform BMAT or MOVE assessment daily    - Set and communicate daily mobility goal to care team and patient/family/caregiver  - Collaborate with rehabilitation services on mobility goals if consulted  - Perform Range of Motion 2 times a day  - Reposition patient every 2 hours    - Dangle patient 2 times a day  - Stand patient 2 times a day  - Ambulate patient 2 times a day  - Out of bed to chair 2 times a day   - Out of bed for meals 2 times a day  - Out of bed for toileting  - Record patient progress and toleration of activity level   Outcome: Progressing  Goal: Patient will remain free of falls  Description: INTERVENTIONS:  - Educate patient/family on patient safety including physical limitations  - Instruct patient to call for assistance with activity   - Consult OT/PT to assist with strengthening/mobility   - Keep Call bell within reach  - Keep bed low and locked with side rails adjusted as appropriate  - Keep care items and personal belongings within reach  - Initiate and maintain comfort rounds  - Make Fall Risk Sign visible to staff  - Offer Toileting every 2 Hours, in advance of need  - Initiate/Maintain bed alarm  - Obtain necessary fall risk management equipment: socks  - Apply yellow socks and bracelet for high fall risk patients  - Consider moving patient to room near nurses station  Outcome: Progressing     Problem: DISCHARGE PLANNING  Goal: Discharge to home or other facility with appropriate resources  Description: INTERVENTIONS:  - Identify barriers to discharge w/patient and caregiver  - Arrange for needed discharge resources and transportation as appropriate  - Identify discharge learning needs (meds, wound care, etc )  - Arrange for interpretive services to assist at discharge as needed  - Refer to Case Management Department for coordinating discharge planning if the patient needs post-hospital services based on physician/advanced practitioner order or complex needs related to functional status, cognitive ability, or social support system  Outcome: Progressing     Problem: Knowledge Deficit  Goal: Patient/family/caregiver demonstrates understanding of disease process, treatment plan, medications, and discharge instructions  Description: Complete learning assessment and assess knowledge base  Interventions:  - Provide teaching at level of understanding  - Provide teaching via preferred learning methods  Outcome: Progressing     Problem: Prexisting or High Potential for Compromised Skin Integrity  Goal: Skin integrity is maintained or improved  Description: INTERVENTIONS:  - Identify patients at risk for skin breakdown  - Assess and monitor skin integrity  - Assess and monitor nutrition and hydration status  - Monitor labs   - Assess for incontinence   - Turn and reposition patient  - Assist with mobility/ambulation  - Relieve pressure over bony prominences  - Avoid friction and shearing  - Provide appropriate hygiene as needed including keeping skin clean and dry  - Evaluate need for skin moisturizer/barrier cream  - Collaborate with interdisciplinary team   - Patient/family teaching  - Consider wound care consult   Outcome: Progressing     Problem: Nutrition/Hydration-ADULT  Goal: Nutrient/Hydration intake appropriate for improving, restoring or maintaining nutritional needs  Description: Monitor and assess patient's nutrition/hydration status for malnutrition  Collaborate with interdisciplinary team and initiate plan and interventions as ordered  Monitor patient's weight and dietary intake as ordered or per policy  Utilize nutrition screening tool and intervene as necessary  Determine patient's food preferences and provide high-protein, high-caloric foods as appropriate       INTERVENTIONS:  - Monitor oral intake, urinary output, labs, and treatment plans  - Assess nutrition and hydration status and recommend course of action  - Evaluate amount of meals eaten  - Assist patient with eating if necessary   - Allow adequate time for meals  - Recommend/ encourage appropriate diets, oral nutritional supplements, and vitamin/mineral supplements  - Order, calculate, and assess calorie counts as needed  - Recommend, monitor, and adjust tube feedings and TPN/PPN based on assessed needs  - Assess need for intravenous fluids  - Provide specific nutrition/hydration education as appropriate  - Include patient/family/caregiver in decisions related to nutrition  Outcome: Progressing

## 2023-03-08 NOTE — ASSESSMENT & PLAN NOTE
· Presented for SOB + cough x several days, no relief with home nebs  Meeting sepsis  Placed on 2L on admission  · CXR: Left lingula and lateral left perihilar small patchy densities     · Urinary antigens negative    · Sputum GS/C not obtained   · Received 3 days of IV Ceftriaxone and completed 3 days of IV Azithromycin   · Will discharge patient with PO Vantin BID to complete 7 day course  · Stable on room air   · Speech: dysphagia 3 + thins  · PT/OT: O[facility] Killian  · Outpatient PCP follow up and repeat CXR in 4-6 weeks to assess for resolution of PNA

## 2023-03-08 NOTE — ASSESSMENT & PLAN NOTE
· SIRS: tachypnea + tachycardia  Source is PNA  · Lactic WNL  · Procal 0 69 --> 13 --> 6  · CXR: Left lingula and lateral left perihilar small patchy densities     · UA without infection   · Initial BC 1/2 growing gram + cocci (staph epidermidis)  · Repeat BC pending   · Suspect contaminant   · Continue on IV ceftriaxone/azithro

## 2023-03-08 NOTE — ASSESSMENT & PLAN NOTE
Recent GIB/BRBPR during prior admission, colonoscopy had been deferred 2/2 age  · Baseline Hgb is 7 8-8 6  · Hgb on admission 8 7  · No evidence of active bleeding   · Iron panel reflect deficiency, continue PO supplement (venofer contraindicated)  · Monitor CBC, Hgb stable at 8

## 2023-03-08 NOTE — ASSESSMENT & PLAN NOTE
· SIRS: tachypnea + tachycardia  Source is PNA  · Lactic WNL  · Procal 0 69 --> 13 --> 6  · CXR: Left lingula and lateral left perihilar small patchy densities     · UA without infection   · Initial BC 1/2 growing gram + cocci (staph epidermidis)  · Repeat BC with no growth at 24h  · Suspect contaminant  · Resolved

## 2023-03-08 NOTE — ASSESSMENT & PLAN NOTE
· Presented for SOB + cough x several days, no relief with home nebs  Meeting sepsis  Placed on 2L on admission  · CXR: Left lingula and lateral left perihilar small patchy densities     · Urinary antigens negative   · MRSA pending   · Sputum GS/C pending   · IV cefepime in ED 3/6 --> continue on IV ceftriaxone & azithromycin x 3 days  · Weaned to 1L     Speech: dysphagia 3 + thins  PT/OT: [facility]

## 2023-03-09 VITALS
HEIGHT: 58 IN | HEART RATE: 82 BPM | DIASTOLIC BLOOD PRESSURE: 67 MMHG | BODY MASS INDEX: 23.03 KG/M2 | WEIGHT: 109.7 LBS | TEMPERATURE: 97.2 F | OXYGEN SATURATION: 92 % | SYSTOLIC BLOOD PRESSURE: 135 MMHG | RESPIRATION RATE: 21 BRPM

## 2023-03-09 PROBLEM — R09.02 HYPOXIA: Status: RESOLVED | Noted: 2023-03-06 | Resolved: 2023-03-09

## 2023-03-09 PROBLEM — A41.9 SEPSIS (HCC): Status: RESOLVED | Noted: 2023-03-06 | Resolved: 2023-03-09

## 2023-03-09 LAB
ALBUMIN SERPL BCP-MCNC: 3 G/DL (ref 3.5–5)
ALP SERPL-CCNC: 75 U/L (ref 34–104)
ALT SERPL W P-5'-P-CCNC: 16 U/L (ref 7–52)
ANION GAP SERPL CALCULATED.3IONS-SCNC: 5 MMOL/L (ref 4–13)
ANISOCYTOSIS BLD QL SMEAR: PRESENT
AST SERPL W P-5'-P-CCNC: 17 U/L (ref 13–39)
BACTERIA BLD CULT: ABNORMAL
BASOPHILS # BLD MANUAL: 0 THOUSAND/UL (ref 0–0.1)
BASOPHILS NFR MAR MANUAL: 0 % (ref 0–1)
BILIRUB SERPL-MCNC: 0.34 MG/DL (ref 0.2–1)
BUN SERPL-MCNC: 24 MG/DL (ref 5–25)
CALCIUM ALBUM COR SERPL-MCNC: 9.3 MG/DL (ref 8.3–10.1)
CALCIUM SERPL-MCNC: 8.5 MG/DL (ref 8.4–10.2)
CHLORIDE SERPL-SCNC: 105 MMOL/L (ref 96–108)
CO2 SERPL-SCNC: 28 MMOL/L (ref 21–32)
CREAT SERPL-MCNC: 0.66 MG/DL (ref 0.6–1.3)
EOSINOPHIL # BLD MANUAL: 0 THOUSAND/UL (ref 0–0.4)
EOSINOPHIL NFR BLD MANUAL: 0 % (ref 0–6)
ERYTHROCYTE [DISTWIDTH] IN BLOOD BY AUTOMATED COUNT: 15.9 % (ref 11.6–15.1)
GFR SERPL CREATININE-BSD FRML MDRD: 74 ML/MIN/1.73SQ M
GLUCOSE SERPL-MCNC: 160 MG/DL (ref 65–140)
GRAM STN SPEC: ABNORMAL
HCT VFR BLD AUTO: 24.3 % (ref 34.8–46.1)
HGB BLD-MCNC: 8 G/DL (ref 11.5–15.4)
LYMPHOCYTES # BLD AUTO: 0.45 THOUSAND/UL (ref 0.6–4.47)
LYMPHOCYTES # BLD AUTO: 4 % (ref 14–44)
MCH RBC QN AUTO: 37.2 PG (ref 26.8–34.3)
MCHC RBC AUTO-ENTMCNC: 32.9 G/DL (ref 31.4–37.4)
MCV RBC AUTO: 113 FL (ref 82–98)
MECA+MECC ISLT/SPM QL: DETECTED
MONOCYTES # BLD AUTO: 0.34 THOUSAND/UL (ref 0–1.22)
MONOCYTES NFR BLD: 3 % (ref 4–12)
NEUTROPHILS # BLD MANUAL: 10.56 THOUSAND/UL (ref 1.85–7.62)
NEUTS BAND NFR BLD MANUAL: 2 % (ref 0–8)
NEUTS SEG NFR BLD AUTO: 91 % (ref 43–75)
PLATELET # BLD AUTO: 277 THOUSANDS/UL (ref 149–390)
PLATELET BLD QL SMEAR: ADEQUATE
PMV BLD AUTO: 9.6 FL (ref 8.9–12.7)
POLYCHROMASIA BLD QL SMEAR: PRESENT
POTASSIUM SERPL-SCNC: 5.2 MMOL/L (ref 3.5–5.3)
PROT SERPL-MCNC: 5.6 G/DL (ref 6.4–8.4)
RBC # BLD AUTO: 2.15 MILLION/UL (ref 3.81–5.12)
RBC MORPH BLD: PRESENT
S EPIDERMIDIS DNA BLD POS QL NAA+NON-PRB: DETECTED
SODIUM SERPL-SCNC: 138 MMOL/L (ref 135–147)
WBC # BLD AUTO: 11.35 THOUSAND/UL (ref 4.31–10.16)

## 2023-03-09 RX ORDER — PREDNISONE 20 MG/1
40 TABLET ORAL DAILY
Status: DISCONTINUED | OUTPATIENT
Start: 2023-03-09 | End: 2023-03-09 | Stop reason: HOSPADM

## 2023-03-09 RX ORDER — PREDNISONE 20 MG/1
TABLET ORAL
Qty: 12 TABLET | Refills: 0 | Status: ON HOLD | OUTPATIENT
Start: 2023-03-10 | End: 2023-03-21

## 2023-03-09 RX ORDER — CEFPODOXIME PROXETIL 200 MG/1
200 TABLET, FILM COATED ORAL 2 TIMES DAILY
Qty: 8 TABLET | Refills: 0 | Status: SHIPPED | OUTPATIENT
Start: 2023-03-09 | End: 2023-03-13

## 2023-03-09 RX ORDER — BENZONATATE 100 MG/1
100 CAPSULE ORAL 3 TIMES DAILY PRN
Qty: 20 CAPSULE | Refills: 0 | Status: SHIPPED | OUTPATIENT
Start: 2023-03-09

## 2023-03-09 RX ADMIN — FERROUS SULFATE TAB 325 MG (65 MG ELEMENTAL FE) 325 MG: 325 (65 FE) TAB at 08:41

## 2023-03-09 RX ADMIN — HYDROXYUREA 500 MG: 500 CAPSULE ORAL at 08:51

## 2023-03-09 RX ADMIN — PREDNISONE 40 MG: 20 TABLET ORAL at 08:40

## 2023-03-09 RX ADMIN — AMLODIPINE BESYLATE 10 MG: 5 TABLET ORAL at 08:40

## 2023-03-09 RX ADMIN — IPRATROPIUM BROMIDE 0.5 MG: 0.5 SOLUTION RESPIRATORY (INHALATION) at 07:57

## 2023-03-09 RX ADMIN — LEVALBUTEROL HYDROCHLORIDE 1.25 MG: 1.25 SOLUTION, CONCENTRATE RESPIRATORY (INHALATION) at 07:57

## 2023-03-09 RX ADMIN — PANTOPRAZOLE SODIUM 40 MG: 40 TABLET, DELAYED RELEASE ORAL at 05:29

## 2023-03-09 RX ADMIN — BUDESONIDE 0.5 MG: 0.5 INHALANT ORAL at 07:57

## 2023-03-09 RX ADMIN — GUAIFENESIN 600 MG: 600 TABLET ORAL at 08:40

## 2023-03-09 RX ADMIN — FORMOTEROL FUMARATE DIHYDRATE 20 MCG: 20 SOLUTION RESPIRATORY (INHALATION) at 07:57

## 2023-03-09 RX ADMIN — RIVAROXABAN 20 MG: 20 TABLET, FILM COATED ORAL at 08:40

## 2023-03-09 NOTE — PROGRESS NOTES
Visited briefly with PT  Expressed tiredness and wanted to go home  Pt refused Prayer, but was grateful for the 's visit

## 2023-03-09 NOTE — PROGRESS NOTES
Walked patient in the hallway without oxygen back and forth  Maintained O2 saturation between 90%-91

## 2023-03-09 NOTE — PLAN OF CARE
Problem: MOBILITY - ADULT  Goal: Maintain or return to baseline ADL function  Description: INTERVENTIONS:  -  Assess patient's ability to carry out ADLs; assess patient's baseline for ADL function and identify physical deficits which impact ability to perform ADLs (bathing, care of mouth/teeth, toileting, grooming, dressing, etc )  - Assess/evaluate cause of self-care deficits   - Assess range of motion  - Assess patient's mobility; develop plan if impaired  - Assess patient's need for assistive devices and provide as appropriate  - Encourage maximum independence but intervene and supervise when necessary  - Involve family in performance of ADLs  - Assess for home care needs following discharge   - Consider OT consult to assist with ADL evaluation and planning for discharge  - Provide patient education as appropriate  Outcome: Progressing  Goal: Maintains/Returns to pre admission functional level  Description: INTERVENTIONS:  - Perform BMAT or MOVE assessment daily    - Set and communicate daily mobility goal to care team and patient/family/caregiver  - Collaborate with rehabilitation services on mobility goals if consulted  - Perform Range of Motion 2 times a day  - Reposition patient every 2 hours    - Dangle patient 2 times a day  - Stand patient 2 times a day  - Ambulate patient 2 times a day  - Out of bed to chair 2 times a day   - Out of bed for meals 2 times a day  - Out of bed for toileting  - Record patient progress and toleration of activity level   Outcome: Progressing     Problem: PAIN - ADULT  Goal: Verbalizes/displays adequate comfort level or baseline comfort level  Description: Interventions:  - Encourage patient to monitor pain and request assistance  - Assess pain using appropriate pain scale  - Administer analgesics based on type and severity of pain and evaluate response  - Implement non-pharmacological measures as appropriate and evaluate response  - Consider cultural and social influences on pain and pain management  - Notify physician/advanced practitioner if interventions unsuccessful or patient reports new pain  Outcome: Progressing     Problem: INFECTION - ADULT  Goal: Absence or prevention of progression during hospitalization  Description: INTERVENTIONS:  - Assess and monitor for signs and symptoms of infection  - Monitor lab/diagnostic results  - Monitor all insertion sites, i e  indwelling lines, tubes, and drains  - Monitor endotracheal if appropriate and nasal secretions for changes in amount and color  - [place] appropriate cooling/warming therapies per order  - Administer medications as ordered  - Instruct and encourage patient and family to use good hand hygiene technique  - Identify and instruct in appropriate isolation precautions for identified infection/condition  Outcome: Progressing  Goal: Absence of fever/infection during neutropenic period  Description: INTERVENTIONS:  - Monitor WBC    Outcome: Progressing     Problem: SAFETY ADULT  Goal: Maintain or return to baseline ADL function  Description: INTERVENTIONS:  -  Assess patient's ability to carry out ADLs; assess patient's baseline for ADL function and identify physical deficits which impact ability to perform ADLs (bathing, care of mouth/teeth, toileting, grooming, dressing, etc )  - Assess/evaluate cause of self-care deficits   - Assess range of motion  - Assess patient's mobility; develop plan if impaired  - Assess patient's need for assistive devices and provide as appropriate  - Encourage maximum independence but intervene and supervise when necessary  - Involve family in performance of ADLs  - Assess for home care needs following discharge   - Consider OT consult to assist with ADL evaluation and planning for discharge  - Provide patient education as appropriate  Outcome: Progressing  Goal: Maintains/Returns to pre admission functional level  Description: INTERVENTIONS:  - Perform BMAT or MOVE assessment daily    - Set and communicate daily mobility goal to care team and patient/family/caregiver  - Collaborate with rehabilitation services on mobility goals if consulted  - Perform Range of Motion 2 times a day  - Reposition patient every 2 hours    - Dangle patient 2 times a day  - Stand patient 2 times a day  - Ambulate patient 2 times a day  - Out of bed to chair 2 times a day   - Out of bed for meals 2 times a day  - Out of bed for toileting  - Record patient progress and toleration of activity level   Outcome: Progressing  Goal: Patient will remain free of falls  Description: INTERVENTIONS:  - Educate patient/family on patient safety including physical limitations  - Instruct patient to call for assistance with activity   - Consult OT/PT to assist with strengthening/mobility   - Keep Call bell within reach  - Keep bed low and locked with side rails adjusted as appropriate  - Keep care items and personal belongings within reach  - Initiate and maintain comfort rounds  - Make Fall Risk Sign visible to staff  - Offer Toileting every 2 Hours, in advance of need  - Initiate/Maintain bed alarm  - Obtain necessary fall risk management equipment: socks  - Apply yellow socks and bracelet for high fall risk patients  - Consider moving patient to room near nurses station  Outcome: Progressing     Problem: DISCHARGE PLANNING  Goal: Discharge to home or other facility with appropriate resources  Description: INTERVENTIONS:  - Identify barriers to discharge w/patient and caregiver  - Arrange for needed discharge resources and transportation as appropriate  - Identify discharge learning needs (meds, wound care, etc )  - Arrange for interpretive services to assist at discharge as needed  - Refer to Case Management Department for coordinating discharge planning if the patient needs post-hospital services based on physician/advanced practitioner order or complex needs related to functional status, cognitive ability, or social support system  Outcome: Progressing     Problem: Knowledge Deficit  Goal: Patient/family/caregiver demonstrates understanding of disease process, treatment plan, medications, and discharge instructions  Description: Complete learning assessment and assess knowledge base  Interventions:  - Provide teaching at level of understanding  - Provide teaching via preferred learning methods  Outcome: Progressing     Problem: Prexisting or High Potential for Compromised Skin Integrity  Goal: Skin integrity is maintained or improved  Description: INTERVENTIONS:  - Identify patients at risk for skin breakdown  - Assess and monitor skin integrity  - Assess and monitor nutrition and hydration status  - Monitor labs   - Assess for incontinence   - Turn and reposition patient  - Assist with mobility/ambulation  - Relieve pressure over bony prominences  - Avoid friction and shearing  - Provide appropriate hygiene as needed including keeping skin clean and dry  - Evaluate need for skin moisturizer/barrier cream  - Collaborate with interdisciplinary team   - Patient/family teaching  - Consider wound care consult   Outcome: Progressing     Problem: Nutrition/Hydration-ADULT  Goal: Nutrient/Hydration intake appropriate for improving, restoring or maintaining nutritional needs  Description: Monitor and assess patient's nutrition/hydration status for malnutrition  Collaborate with interdisciplinary team and initiate plan and interventions as ordered  Monitor patient's weight and dietary intake as ordered or per policy  Utilize nutrition screening tool and intervene as necessary  Determine patient's food preferences and provide high-protein, high-caloric foods as appropriate       INTERVENTIONS:  - Monitor oral intake, urinary output, labs, and treatment plans  - Assess nutrition and hydration status and recommend course of action  - Evaluate amount of meals eaten  - Assist patient with eating if necessary   - Allow adequate time for meals  - Recommend/ encourage appropriate diets, oral nutritional supplements, and vitamin/mineral supplements  - Order, calculate, and assess calorie counts as needed  - Recommend, monitor, and adjust tube feedings and TPN/PPN based on assessed needs  - Assess need for intravenous fluids  - Provide specific nutrition/hydration education as appropriate  - Include patient/family/caregiver in decisions related to nutrition  Outcome: Progressing

## 2023-03-09 NOTE — PLAN OF CARE
Problem: PAIN - ADULT  Goal: Verbalizes/displays adequate comfort level or baseline comfort level  Description: Interventions:  - Encourage patient to monitor pain and request assistance  - Assess pain using appropriate pain scale  - Administer analgesics based on type and severity of pain and evaluate response  - Implement non-pharmacological measures as appropriate and evaluate response  - Consider cultural and social influences on pain and pain management  - Notify physician/advanced practitioner if interventions unsuccessful or patient reports new pain  Outcome: Progressing     Problem: INFECTION - ADULT  Goal: Absence or prevention of progression during hospitalization  Description: INTERVENTIONS:  - Assess and monitor for signs and symptoms of infection  - Monitor lab/diagnostic results  - Monitor all insertion sites, i e  indwelling lines, tubes, and drains  - Monitor endotracheal if appropriate and nasal secretions for changes in amount and color  - [place] appropriate cooling/warming therapies per order  - Administer medications as ordered  - Instruct and encourage patient and family to use good hand hygiene technique  - Identify and instruct in appropriate isolation precautions for identified infection/condition  Outcome: Progressing  Goal: Absence of fever/infection during neutropenic period  Description: INTERVENTIONS:  - Monitor WBC    Outcome: Progressing     Problem: SAFETY ADULT  Goal: Maintain or return to baseline ADL function  Description: INTERVENTIONS:  -  Assess patient's ability to carry out ADLs; assess patient's baseline for ADL function and identify physical deficits which impact ability to perform ADLs (bathing, care of mouth/teeth, toileting, grooming, dressing, etc )  - Assess/evaluate cause of self-care deficits   - Assess range of motion  - Assess patient's mobility; develop plan if impaired  - Assess patient's need for assistive devices and provide as appropriate  - Encourage maximum independence but intervene and supervise when necessary  - Involve family in performance of ADLs  - Assess for home care needs following discharge   - Consider OT consult to assist with ADL evaluation and planning for discharge  - Provide patient education as appropriate  Outcome: Progressing  Goal: Maintains/Returns to pre admission functional level  Description: INTERVENTIONS:  - Perform BMAT or MOVE assessment daily    - Set and communicate daily mobility goal to care team and patient/family/caregiver  - Collaborate with rehabilitation services on mobility goals if consulted  - Perform Range of Motion 2 times a day  - Reposition patient every 2 hours    - Dangle patient 2 times a day  - Stand patient 2 times a day  - Ambulate patient 2 times a day  - Out of bed to chair 2 times a day   - Out of bed for meals 2 times a day  - Out of bed for toileting  - Record patient progress and toleration of activity level   Outcome: Progressing  Goal: Patient will remain free of falls  Description: INTERVENTIONS:  - Educate patient/family on patient safety including physical limitations  - Instruct patient to call for assistance with activity   - Consult OT/PT to assist with strengthening/mobility   - Keep Call bell within reach  - Keep bed low and locked with side rails adjusted as appropriate  - Keep care items and personal belongings within reach  - Initiate and maintain comfort rounds  - Make Fall Risk Sign visible to staff  - Offer Toileting every 2 Hours, in advance of need  - Initiate/Maintain bed alarm  - Obtain necessary fall risk management equipment: socks  - Apply yellow socks and bracelet for high fall risk patients  - Consider moving patient to room near nurses station  Outcome: Progressing     Problem: MOBILITY - ADULT  Goal: Maintain or return to baseline ADL function  Description: INTERVENTIONS:  -  Assess patient's ability to carry out ADLs; assess patient's baseline for ADL function and identify physical deficits which impact ability to perform ADLs (bathing, care of mouth/teeth, toileting, grooming, dressing, etc )  - Assess/evaluate cause of self-care deficits   - Assess range of motion  - Assess patient's mobility; develop plan if impaired  - Assess patient's need for assistive devices and provide as appropriate  - Encourage maximum independence but intervene and supervise when necessary  - Involve family in performance of ADLs  - Assess for home care needs following discharge   - Consider OT consult to assist with ADL evaluation and planning for discharge  - Provide patient education as appropriate  Outcome: Progressing  Goal: Maintains/Returns to pre admission functional level  Description: INTERVENTIONS:  - Perform BMAT or MOVE assessment daily    - Set and communicate daily mobility goal to care team and patient/family/caregiver  - Collaborate with rehabilitation services on mobility goals if consulted  - Perform Range of Motion 2 times a day  - Reposition patient every 2 hours  - Dangle patient 2 times a day  - Stand patient 2 times a day  - Ambulate patient 2 times a day  - Out of bed to chair 2 times a day   - Out of bed for meals 2 times a day  - Out of bed for toileting  - Record patient progress and toleration of activity level   Outcome: Progressing     Problem: Nutrition/Hydration-ADULT  Goal: Nutrient/Hydration intake appropriate for improving, restoring or maintaining nutritional needs  Description: Monitor and assess patient's nutrition/hydration status for malnutrition  Collaborate with interdisciplinary team and initiate plan and interventions as ordered  Monitor patient's weight and dietary intake as ordered or per policy  Utilize nutrition screening tool and intervene as necessary  Determine patient's food preferences and provide high-protein, high-caloric foods as appropriate       INTERVENTIONS:  - Monitor oral intake, urinary output, labs, and treatment plans  - Assess nutrition and hydration status and recommend course of action  - Evaluate amount of meals eaten  - Assist patient with eating if necessary   - Allow adequate time for meals  - Recommend/ encourage appropriate diets, oral nutritional supplements, and vitamin/mineral supplements  - Order, calculate, and assess calorie counts as needed  - Recommend, monitor, and adjust tube feedings and TPN/PPN based on assessed needs  - Assess need for intravenous fluids  - Provide specific nutrition/hydration education as appropriate  - Include patient/family/caregiver in decisions related to nutrition  Outcome: Progressing

## 2023-03-09 NOTE — DISCHARGE INSTR - AVS FIRST PAGE
Please take antibiotic Vantin 200mg twice a day for 4 days    Start taking prednisone 40mg daily x 2 days (starting tomorrow), then 30mg daily x 3 days, then 20mg daily x 3 days, then stop   May take Tessalon Perles as needed for cough   Follow up with your primary care provider within 1 week  Repeat chest xray in 4-6 weeks to check for resolution of pneumonia

## 2023-03-10 NOTE — UTILIZATION REVIEW
NOTIFICATION OF ADMISSION DISCHARGE   This is a Notification of Discharge from [address]  Please be advised that this patient has been discharge from our facility  Below you will find the admission and discharge date and time including the patient’s disposition  UTILIZATION REVIEW CONTACT:  Enrrique Ulrich  Utilization   Network Utilization Review Department  Phone: [phone] carefully listen to the prompts  All voicemails are confidential   Email: [email or web address]     ADMISSION INFORMATION  PRESENTATION DATE: 3/6/2023 10:21 AM  OBERVATION ADMISSION DATE:   INPATIENT ADMISSION DATE: 3/6/23 11:58 AM   DISCHARGE DATE: 3/9/2023  1:03 PM   DISPOSITION:Home with Home Health Care    IMPORTANT INFORMATION:  Send all requests for admission clinical reviews, approved or denied determinations and any other requests to dedicated fax number below belonging to the campus where the patient is receiving treatment   List of dedicated fax numbers:  [address] DENIALS (Administrative/Medical Necessity) [phone]   [address] (Maternity/NICU/Pediatrics) [phone]   [facility] [phone]   [facility] [phone]   [address] [phone]   [facility] [phone]   [facility] [phone]   [address] [place] [address] [phone]   [facility]  [phone]   [address] [facility] [phone]   [address] [facility] [address] [facility] [phone]

## 2023-03-11 LAB — BACTERIA BLD CULT: NORMAL

## 2023-03-12 LAB
BACTERIA BLD CULT: NORMAL
BACTERIA BLD CULT: NORMAL

## 2023-03-18 ENCOUNTER — APPOINTMENT (EMERGENCY)
Dept: RADIOLOGY | Facility: HOSPITAL | Age: 88
End: 2023-03-18

## 2023-03-18 ENCOUNTER — HOSPITAL ENCOUNTER (INPATIENT)
Facility: HOSPITAL | Age: 88
LOS: 3 days | Discharge: HOME | End: 2023-03-21
Attending: EMERGENCY MEDICINE | Admitting: INTERNAL MEDICINE

## 2023-03-18 DIAGNOSIS — K59.00 CONSTIPATION: ICD-10-CM

## 2023-03-18 DIAGNOSIS — R06.03 RESPIRATORY DISTRESS: Primary | ICD-10-CM

## 2023-03-18 DIAGNOSIS — J44.1 COPD EXACERBATION (HCC): ICD-10-CM

## 2023-03-18 DIAGNOSIS — J44.9 COPD (CHRONIC OBSTRUCTIVE PULMONARY DISEASE) (HCC): ICD-10-CM

## 2023-03-18 DIAGNOSIS — K44.9 LARGE HIATAL HERNIA: ICD-10-CM

## 2023-03-18 PROBLEM — R65.10 SIRS (SYSTEMIC INFLAMMATORY RESPONSE SYNDROME) (HCC): Status: ACTIVE | Noted: 2023-03-18

## 2023-03-18 PROBLEM — S22.49XA RIB FRACTURES: Status: ACTIVE | Noted: 2017-07-17

## 2023-03-18 LAB
2HR DELTA HS TROPONIN: 0 NG/L
ALBUMIN SERPL BCP-MCNC: 3.6 G/DL (ref 3.5–5)
ALP SERPL-CCNC: 84 U/L (ref 34–104)
ALT SERPL W P-5'-P-CCNC: 13 U/L (ref 7–52)
ANION GAP SERPL CALCULATED.3IONS-SCNC: 8 MMOL/L (ref 4–13)
AST SERPL W P-5'-P-CCNC: 13 U/L (ref 13–39)
BASE EXCESS BLDA CALC-SCNC: 1 MMOL/L (ref -2–3)
BASOPHILS # BLD AUTO: 0.02 THOUSANDS/ÂΜL (ref 0–0.1)
BASOPHILS NFR BLD AUTO: 0 % (ref 0–1)
BILIRUB SERPL-MCNC: 0.45 MG/DL (ref 0.2–1)
BNP SERPL-MCNC: 126 PG/ML (ref 0–100)
BUN SERPL-MCNC: 25 MG/DL (ref 5–25)
CA-I BLD-SCNC: 1.22 MMOL/L (ref 1.12–1.32)
CALCIUM SERPL-MCNC: 8.9 MG/DL (ref 8.4–10.2)
CARDIAC TROPONIN I PNL SERPL HS: 9 NG/L
CARDIAC TROPONIN I PNL SERPL HS: 9 NG/L
CHLORIDE SERPL-SCNC: 103 MMOL/L (ref 96–108)
CO2 SERPL-SCNC: 26 MMOL/L (ref 21–32)
CREAT SERPL-MCNC: 0.91 MG/DL (ref 0.6–1.3)
EOSINOPHIL # BLD AUTO: 0.03 THOUSAND/ÂΜL (ref 0–0.61)
EOSINOPHIL NFR BLD AUTO: 0 % (ref 0–6)
ERYTHROCYTE [DISTWIDTH] IN BLOOD BY AUTOMATED COUNT: 16.7 % (ref 11.6–15.1)
FLUAV RNA RESP QL NAA+PROBE: NEGATIVE
FLUBV RNA RESP QL NAA+PROBE: NEGATIVE
GFR SERPL CREATININE-BSD FRML MDRD: 53 ML/MIN/1.73SQ M
GLUCOSE SERPL-MCNC: 91 MG/DL (ref 65–140)
GLUCOSE SERPL-MCNC: 99 MG/DL (ref 65–140)
HCO3 BLDA-SCNC: 26.1 MMOL/L (ref 24–30)
HCT VFR BLD AUTO: 30.7 % (ref 34.8–46.1)
HCT VFR BLD CALC: 31 % (ref 34.8–46.1)
HGB BLD-MCNC: 9.7 G/DL (ref 11.5–15.4)
HGB BLDA-MCNC: 10.5 G/DL (ref 11.5–15.4)
IMM GRANULOCYTES # BLD AUTO: 0.17 THOUSAND/UL (ref 0–0.2)
IMM GRANULOCYTES NFR BLD AUTO: 1 % (ref 0–2)
INR PPP: 1.65 (ref 0.84–1.19)
LACTATE SERPL-SCNC: 1 MMOL/L (ref 0.5–2)
LYMPHOCYTES # BLD AUTO: 0.99 THOUSANDS/ÂΜL (ref 0.6–4.47)
LYMPHOCYTES NFR BLD AUTO: 8 % (ref 14–44)
MCH RBC QN AUTO: 36.3 PG (ref 26.8–34.3)
MCHC RBC AUTO-ENTMCNC: 31.6 G/DL (ref 31.4–37.4)
MCV RBC AUTO: 115 FL (ref 82–98)
MONOCYTES # BLD AUTO: 0.81 THOUSAND/ÂΜL (ref 0.17–1.22)
MONOCYTES NFR BLD AUTO: 6 % (ref 4–12)
NEUTROPHILS # BLD AUTO: 11.19 THOUSANDS/ÂΜL (ref 1.85–7.62)
NEUTS SEG NFR BLD AUTO: 85 % (ref 43–75)
NRBC BLD AUTO-RTO: 0 /100 WBCS
PCO2 BLD: 27 MMOL/L (ref 21–32)
PCO2 BLD: 42.7 MM HG (ref 42–50)
PH BLD: 7.39 [PH] (ref 7.3–7.4)
PLATELET # BLD AUTO: 390 THOUSANDS/UL (ref 149–390)
PMV BLD AUTO: 9 FL (ref 8.9–12.7)
PO2 BLD: 34 MM HG (ref 35–45)
POTASSIUM BLD-SCNC: 4.9 MMOL/L (ref 3.5–5.3)
POTASSIUM SERPL-SCNC: 4.9 MMOL/L (ref 3.5–5.3)
PROCALCITONIN SERPL-MCNC: 0.08 NG/ML
PROT SERPL-MCNC: 6.3 G/DL (ref 6.4–8.4)
PROTHROMBIN TIME: 20.5 SECONDS (ref 11.6–14.5)
RBC # BLD AUTO: 2.67 MILLION/UL (ref 3.81–5.12)
RSV RNA RESP QL NAA+PROBE: NEGATIVE
SAO2 % BLD FROM PO2: 65 % (ref 60–85)
SARS-COV-2 RNA RESP QL NAA+PROBE: NEGATIVE
SODIUM BLD-SCNC: 136 MMOL/L (ref 136–145)
SODIUM SERPL-SCNC: 137 MMOL/L (ref 135–147)
SPECIMEN SOURCE: ABNORMAL
WBC # BLD AUTO: 13.21 THOUSAND/UL (ref 4.31–10.16)

## 2023-03-18 PROCEDURE — 5A09357 ASSISTANCE WITH RESPIRATORY VENTILATION, LESS THAN 24 CONSECUTIVE HOURS, CONTINUOUS POSITIVE AIRWAY PRESSURE: ICD-10-PCS | Performed by: STUDENT IN AN ORGANIZED HEALTH CARE EDUCATION/TRAINING PROGRAM

## 2023-03-18 RX ORDER — HYDROXYUREA 500 MG/1
500 CAPSULE ORAL DAILY
Status: DISCONTINUED | OUTPATIENT
Start: 2023-03-19 | End: 2023-03-21 | Stop reason: HOSPADM

## 2023-03-18 RX ORDER — BUDESONIDE 0.5 MG/2ML
0.5 INHALANT ORAL
Status: DISCONTINUED | OUTPATIENT
Start: 2023-03-19 | End: 2023-03-21 | Stop reason: HOSPADM

## 2023-03-18 RX ORDER — PANTOPRAZOLE SODIUM 40 MG/1
40 TABLET, DELAYED RELEASE ORAL
Status: DISCONTINUED | OUTPATIENT
Start: 2023-03-19 | End: 2023-03-21 | Stop reason: HOSPADM

## 2023-03-18 RX ORDER — FLUTICASONE FUROATE AND VILANTEROL 200; 25 UG/1; UG/1
1 POWDER RESPIRATORY (INHALATION)
Status: DISCONTINUED | OUTPATIENT
Start: 2023-03-19 | End: 2023-03-21 | Stop reason: HOSPADM

## 2023-03-18 RX ORDER — LEVALBUTEROL 1.25 MG/.5ML
1.25 SOLUTION, CONCENTRATE RESPIRATORY (INHALATION)
Status: DISCONTINUED | OUTPATIENT
Start: 2023-03-19 | End: 2023-03-19

## 2023-03-18 RX ORDER — DEXAMETHASONE SODIUM PHOSPHATE 4 MG/ML
10 INJECTION, SOLUTION INTRA-ARTICULAR; INTRALESIONAL; INTRAMUSCULAR; INTRAVENOUS; SOFT TISSUE ONCE
Status: COMPLETED | OUTPATIENT
Start: 2023-03-18 | End: 2023-03-18

## 2023-03-18 RX ORDER — MONTELUKAST SODIUM 10 MG/1
10 TABLET ORAL
Status: DISCONTINUED | OUTPATIENT
Start: 2023-03-18 | End: 2023-03-21 | Stop reason: HOSPADM

## 2023-03-18 RX ORDER — AMLODIPINE BESYLATE 5 MG/1
10 TABLET ORAL DAILY
Status: DISCONTINUED | OUTPATIENT
Start: 2023-03-19 | End: 2023-03-21 | Stop reason: HOSPADM

## 2023-03-18 RX ORDER — SODIUM CHLORIDE FOR INHALATION 0.9 %
3 VIAL, NEBULIZER (ML) INHALATION ONCE
Status: COMPLETED | OUTPATIENT
Start: 2023-03-18 | End: 2023-03-18

## 2023-03-18 RX ORDER — GUAIFENESIN 600 MG/1
600 TABLET, EXTENDED RELEASE ORAL EVERY 12 HOURS SCHEDULED
Status: DISCONTINUED | OUTPATIENT
Start: 2023-03-18 | End: 2023-03-21 | Stop reason: HOSPADM

## 2023-03-18 RX ORDER — METHYLPREDNISOLONE SODIUM SUCCINATE 40 MG/ML
40 INJECTION, POWDER, LYOPHILIZED, FOR SOLUTION INTRAMUSCULAR; INTRAVENOUS EVERY 8 HOURS SCHEDULED
Status: DISCONTINUED | OUTPATIENT
Start: 2023-03-18 | End: 2023-03-20

## 2023-03-18 RX ADMIN — DEXAMETHASONE SODIUM PHOSPHATE 10 MG: 4 INJECTION, SOLUTION INTRAMUSCULAR; INTRAVENOUS at 19:59

## 2023-03-18 RX ADMIN — ALBUTEROL SULFATE 10 MG: 2.5 SOLUTION RESPIRATORY (INHALATION) at 20:02

## 2023-03-18 RX ADMIN — MONTELUKAST 10 MG: 10 TABLET, FILM COATED ORAL at 23:20

## 2023-03-18 RX ADMIN — ISODIUM CHLORIDE 3 ML: 0.03 SOLUTION RESPIRATORY (INHALATION) at 20:02

## 2023-03-18 RX ADMIN — METHYLPREDNISOLONE SODIUM SUCCINATE 40 MG: 40 INJECTION, POWDER, FOR SOLUTION INTRAMUSCULAR; INTRAVENOUS at 23:20

## 2023-03-18 RX ADMIN — IPRATROPIUM BROMIDE 1 MG: 0.5 SOLUTION RESPIRATORY (INHALATION) at 20:02

## 2023-03-18 RX ADMIN — GUAIFENESIN 600 MG: 600 TABLET ORAL at 23:20

## 2023-03-18 NOTE — ED PROVIDER NOTES
History  Chief Complaint   Patient presents with   • Shortness of Breath     This afternoon started with SOB  Hx COPD  Pt states she has not had a BM in over a week, and she feels like if she had a BM she could breathe better     HPI   81 y/o F with pertinent PMHX / PSHX COPD HTN/HLD/Hiatal hernia presents to the ED for SOB and wheezing  Was in hospital last week  Now since lunchtime w/ 11 AM w/ worsening wheezing  Had duoneb and MDI PTA  Currently on steroids and struggling to breathe  History provided by patient and  and applicable records reviewed from prior admission  DDX: CHF/ COPD / Pneumothorax / Aspiration Pneumonitis      Prior to Admission Medications   Prescriptions Last Dose Informant Patient Reported? Taking? XARELTO 20 MG tablet   Yes No   Sig: daily    albuterol (2 5 mg/3 mL) 0 083 % nebulizer solution   Yes No   Sig: Take 2 5 mg by nebulization 4 (four) times a day   albuterol (PROVENTIL HFA,VENTOLIN HFA) 90 mcg/act inhaler   Yes No   Sig: Inhale daily as needed    amLODIPine (NORVASC) 10 mg tablet   Yes No   Sig: daily   benzonatate (TESSALON PERLES) 100 mg capsule   No No   Sig: Take 1 capsule (100 mg total) by mouth 3 (three) times a day as needed for cough   budesonide (PULMICORT) 0 5 mg/2 mL nebulizer solution   Yes No   Sig: Take 0 5 mg by nebulization 2 (two) times a day Rinse mouth after use  ferrous sulfate 325 (65 Fe) mg tablet   No No   Sig: Take 1 tablet (325 mg total) by mouth daily with breakfast Do not start before March 1, 2023     guaiFENesin (MUCINEX) 600 mg 12 hr tablet   Yes No   Sig: Take 600 mg by mouth every 12 (twelve) hours   hydroxyurea (HYDREA) 500 mg capsule   Yes No   Sig: Take by mouth daily   ipratropium-albuterol (DUO-NEB) 0 5-2 5 mg/3 mL nebulizer solution   No No   Sig: Take 3 mL by nebulization every 6 (six) hours as needed for wheezing   mometasone-formoterol (DULERA) 200-5 MCG/ACT inhaler   Yes No   Sig: Inhale 2 puffs 2 (two) times a day Rinse mouth after use    montelukast (SINGULAIR) 10 mg tablet   Yes No   Sig: Take by mouth daily at bedtime    pantoprazole (PROTONIX) 40 mg tablet   No No   Sig: Take 1 tablet (40 mg total) by mouth daily before breakfast Do not start before 2023  potassium chloride (MICRO-K) 10 MEQ CR capsule   Yes No   Sig: Take 10 mEq by mouth 2 (two) times a day   predniSONE 20 mg tablet   No No   Sig: Take 2 tablets (40 mg total) by mouth daily for 2 days, THEN 1 5 tablets (30 mg total) daily for 3 days, THEN 1 tablet (20 mg total) daily for 3 days  Do not start before March 10, 2023  Facility-Administered Medications: None       Past Medical History:   Diagnosis Date   • COPD (chronic obstructive pulmonary disease) ([facility] [address] )    • Hiatal hernia    • Hypertension    • Melanoma in situ of the skin [facility])     Multiple sites removed   • Pneumonia    • Pulmonary embolism (HCC)     on Xarelto   • Skin cancer        Past Surgical History:   Procedure Laterality Date   • APPENDECTOMY     • HYSTERECTOMY     • TN ESOPHAGOGASTRODUODENOSCOPY TRANSORAL DIAGNOSTIC N/A 2019    Procedure: ESOPHAGOGASTRODUODENOSCOPY (EGD); Surgeon: Ursula Ling MD;  Location: [facility] OR;  Service: Gastroenterology   • SKIN CANCER EXCISION     • SKIN CANCER EXCISION     • UPPER GASTROINTESTINAL ENDOSCOPY         Family History   Problem Relation Age of Onset   • Heart disease Mother    • Diabetes Father    • Diabetes Sister    • Cancer Brother    • Colon polyps Brother    • Diabetes Brother      I have reviewed and agree with the history as documented      E-Cigarette/Vaping   • E-Cigarette Use Never User      E-Cigarette/Vaping Substances   • Nicotine No    • THC No    • CBD No    • Flavoring No    • Other No    • Unknown No      Social History     Tobacco Use   • Smoking status: Former     Types: Cigarettes     Quit date: 1970     Years since quittin 2   • Smokeless tobacco: Never   Vaping Use   • Vaping Use: Never used   Substance Use Topics   • Alcohol use: Never   • Drug use: Never       Review of Systems   Constitutional: Negative for activity change, appetite change, chills, fatigue and fever  HENT: Negative for postnasal drip, rhinorrhea, sinus pressure and sinus pain  Eyes: Negative for visual disturbance  Respiratory: Positive for shortness of breath and wheezing  Negative for apnea, cough, chest tightness and stridor  Cardiovascular: Negative for chest pain and leg swelling  Gastrointestinal: Negative for diarrhea, nausea and vomiting  Endocrine: Negative for cold intolerance and heat intolerance  Musculoskeletal: Negative for arthralgias, back pain, joint swelling and myalgias  Skin: Negative for color change  Neurological: Negative for syncope and light-headedness  Hematological: Negative for adenopathy  Psychiatric/Behavioral: Negative for confusion and sleep disturbance  Physical Exam  Physical Exam  Vitals and nursing note reviewed  Constitutional:       General: She is not in acute distress  Appearance: She is well-developed  HENT:      Head: Normocephalic and atraumatic  Eyes:      Conjunctiva/sclera: Conjunctivae normal    Cardiovascular:      Rate and Rhythm: Normal rate and regular rhythm  Heart sounds: No murmur heard  Pulmonary:      Effort: Tachypnea, accessory muscle usage and respiratory distress present  Breath sounds: Examination of the right-upper field reveals wheezing  Examination of the left-upper field reveals wheezing  Examination of the right-middle field reveals wheezing  Examination of the left-middle field reveals wheezing  Examination of the right-lower field reveals wheezing  Examination of the left-lower field reveals wheezing  Wheezing present  No decreased breath sounds  Abdominal:      Palpations: Abdomen is soft  Tenderness: There is no abdominal tenderness  Musculoskeletal:         General: No swelling  Cervical back: Neck supple     Skin: General: Skin is warm and dry  Capillary Refill: Capillary refill takes less than 2 seconds  Neurological:      Mental Status: She is alert     Psychiatric:         Mood and Affect: Mood normal          Vital Signs  ED Triage Vitals [03/18/23 1925]   Temperature Pulse Respirations Blood Pressure SpO2   98 2 °F (36 8 °C) 88 (!) 24 165/80 97 %      Temp Source Heart Rate Source Patient Position - Orthostatic VS BP Location FiO2 (%)   Temporal Monitor Sitting Right arm --      Pain Score       No Pain           Vitals:    03/18/23 1925 03/18/23 2100 03/18/23 2253   BP: 165/80 128/96 137/65   Pulse: 88 85 82   Patient Position - Orthostatic VS: Sitting  Lying         Visual Acuity      ED Medications  Medications   amLODIPine (NORVASC) tablet 10 mg (has no administration in time range)   budesonide (PULMICORT) inhalation solution 0 5 mg (has no administration in time range)   guaiFENesin (MUCINEX) 12 hr tablet 600 mg (600 mg Oral Given 3/18/23 2320)   hydroxyurea (HYDREA) capsule 500 mg (has no administration in time range)   fluticasone-vilanterol 200-25 mcg/actuation 1 puff (has no administration in time range)   montelukast (SINGULAIR) tablet 10 mg (10 mg Oral Given 3/18/23 2320)   pantoprazole (PROTONIX) EC tablet 40 mg (has no administration in time range)   rivaroxaban (XARELTO) tablet 20 mg (has no administration in time range)   methylPREDNISolone sodium succinate (Solu-MEDROL) injection 40 mg (40 mg Intravenous Given 3/18/23 2320)   levalbuterol (XOPENEX) inhalation solution 1 25 mg (has no administration in time range)   ipratropium (ATROVENT) 0 02 % inhalation solution 0 5 mg (has no administration in time range)   albuterol inhalation solution 10 mg (10 mg Nebulization Given 3/18/23 2002)     And   ipratropium (ATROVENT) 0 02 % inhalation solution 1 mg (1 mg Nebulization Given 3/18/23 2002)     And   sodium chloride 0 9 % inhalation solution 3 mL (3 mL Nebulization Given 3/18/23 2002)   dexamethasone (DECADRON) injection 10 mg (10 mg Intravenous Given 3/18/23 1959)       Diagnostic Studies  Results Reviewed     Procedure Component Value Units Date/Time    Procalcitonin [[id number]]     Lab Status: No result Specimen: Blood     Strep Pneumoniae, Urine [[id number]] Collected: 03/18/23 2335    Lab Status: In process Specimen: Urine, Clean Catch Updated: 03/18/23 2341    Legionella antigen, urine [[id number]] Collected: 03/18/23 2335    Lab Status: In process Specimen: Urine, Clean Catch Updated: 03/18/23 2341    COVID/FLU/RSV [[id number]]  (Normal) Collected: 03/18/23 2206    Lab Status: Final result Specimen: Nares from Nose Updated: 03/18/23 2256     SARS-CoV-2 Negative     INFLUENZA A PCR Negative     INFLUENZA B PCR Negative     RSV PCR Negative    Narrative:      FOR PEDIATRIC PATIENTS - copy/paste COVID Guidelines URL to browser: [email or web address]    SARS-CoV-2 assay is a Nucleic Acid Amplification assay intended for the  qualitative detection of nucleic acid from SARS-CoV-2 in nasopharyngeal  swabs  Results are for the presumptive identification of SARS-CoV-2 RNA  Positive results are indicative of infection with SARS-CoV-2, the virus  causing COVID-19, but do not rule out bacterial infection or co-infection  with other viruses  Laboratories within the [place] and its  territories are required to report all positive results to the appropriate  public health authorities  Negative results do not preclude SARS-CoV-2  infection and should not be used as the sole basis for treatment or other  patient management decisions  Negative results must be combined with  clinical observations, patient history, and epidemiological information  This test has not been FDA cleared or approved  This test has been authorized by FDA under an Emergency Use Authorization  (EUA)   This test is only authorized for the duration of time the  declaration that circumstances exist justifying the authorization of the  emergency use of an in vitro diagnostic tests for detection of SARS-CoV-2  virus and/or diagnosis of COVID-19 infection under section 564(b)(1) of  the Act, 21 U  S C  275CJT-2(I)(8), unless the authorization is terminated  or revoked sooner  The test has been validated but independent review by FDA  and CLIA is pending  Test performed using [employer]: This RT-PCR assay targets N2,  a region unique to SARS-CoV-2  A conserved region in the E-gene was chosen  for pan-Sarbecovirus detection which includes SARS-CoV-2  According to CMS-2020-01-R, this platform meets the definition of high-throughput technology  HS Troponin I 2hr [[id number]]  (Normal) Collected: 03/18/23 2206    Lab Status: Final result Specimen: Blood from Arm, Left Updated: 03/18/23 2245     hs TnI 2hr 9 ng/L      Delta 2hr hsTnI 0 ng/L     Lactic acid, plasma [[id number]]  (Normal) Collected: 03/18/23 2203    Lab Status: Final result Specimen: Blood Updated: 03/18/23 2233     LACTIC ACID 1 0 mmol/L     Narrative:      Result may be elevated if tourniquet was used during collection  Protime-INR [[id number]]  (Abnormal) Collected: 03/18/23 1946    Lab Status: Final result Specimen: Blood from Arm, Right Updated: 03/18/23 2225     Protime 20 5 seconds      INR 1 65    Blood culture [[id number]] Collected: 03/18/23 2100    Lab Status: In process Specimen: Blood from Arm, Right Updated: 03/18/23 2214    Blood culture [[id number]] Collected: 03/18/23 2100    Lab Status:  In process Specimen: Blood from Arm, Left Updated: 03/18/23 2214    Sputum culture and Gram stain [[id number]]     Lab Status: No result Specimen: Sputum     B-Type Natriuretic Peptide(BNP) [[id number]]  (Abnormal) Collected: 03/18/23 1946    Lab Status: Final result Specimen: Blood from Arm, Right Updated: 03/18/23 2123      pg/mL     Procalcitonin [[id number]]  (Normal) Collected: 03/18/23 1946    Lab Status: Final result Specimen: Blood from Arm, Right Updated: 03/18/23 2058     Procalcitonin 0 08 ng/ml     HS Troponin I 4hr [[id number]]     Lab Status: No result Specimen: Blood     HS Troponin 0hr (reflex protocol) [[id number]]  (Normal) Collected: 03/18/23 1946    Lab Status: Final result Specimen: Blood from Arm, Right Updated: 03/18/23 2023     hs TnI 0hr 9 ng/L     Comprehensive metabolic panel [[id number]]  (Abnormal) Collected: 03/18/23 1946    Lab Status: Final result Specimen: Blood from Arm, Right Updated: 03/18/23 2014     Sodium 137 mmol/L      Potassium 4 9 mmol/L      Chloride 103 mmol/L      CO2 26 mmol/L      ANION GAP 8 mmol/L      BUN 25 mg/dL      Creatinine 0 91 mg/dL      Glucose 91 mg/dL      Calcium 8 9 mg/dL      AST 13 U/L      ALT 13 U/L      Alkaline Phosphatase 84 U/L      Total Protein 6 3 g/dL      Albumin 3 6 g/dL      Total Bilirubin 0 45 mg/dL      eGFR 53 ml/min/1 73sq m     Narrative:      [facility] guidelines for Chronic Kidney Disease (CKD):   •  Stage 1 with normal or high GFR (GFR > 90 mL/min/1 73 square meters)  •  Stage 2 Mild CKD (GFR = 60-89 mL/min/1 73 square meters)  •  Stage 3A Moderate CKD (GFR = 45-59 mL/min/1 73 square meters)  •  Stage 3B Moderate CKD (GFR = 30-44 mL/min/1 73 square meters)  •  Stage 4 Severe CKD (GFR = 15-29 mL/min/1 73 square meters)  •  Stage 5 End Stage CKD (GFR <15 mL/min/1 73 square meters)  Note: GFR calculation is accurate only with a steady state creatinine    CBC and differential [[id number]]  (Abnormal) Collected: 03/18/23 1946    Lab Status: Final result Specimen: Blood from Arm, Right Updated: 03/18/23 1959     WBC 13 21 Thousand/uL      RBC 2 67 Million/uL      Hemoglobin 9 7 g/dL      Hematocrit 30 7 %       fL      MCH 36 3 pg      MCHC 31 6 g/dL      RDW 16 7 %      MPV 9 0 fL      Platelets 220 Thousands/uL      nRBC 0 /100 WBCs      Neutrophils Relative 85 %      Immat GRANS % 1 %      Lymphocytes Relative 8 % Monocytes Relative 6 %      Eosinophils Relative 0 %      Basophils Relative 0 %      Neutrophils Absolute 11 19 Thousands/µL      Immature Grans Absolute 0 17 Thousand/uL      Lymphocytes Absolute 0 99 Thousands/µL      Monocytes Absolute 0 81 Thousand/µL      Eosinophils Absolute 0 03 Thousand/µL      Basophils Absolute 0 02 Thousands/µL     POCT Blood Gas (CG8+) [[id number]]  (Abnormal) Collected: 03/18/23 1955    Lab Status: Final result Specimen: Venous Updated: 03/18/23 1958     ph, Juan Manuel ISTAT 7 394     pCO2, Juan Manuel i-STAT 42 7 mm HG      pO2, Juan Manuel i-STAT 34 0 mm HG      BE, i-STAT 1 mmol/L      HCO3, Juan Manuel i-STAT 26 1 mmol/L      CO2, i-STAT 27 mmol/L      O2 Sat, i-STAT 65 %      SODIUM, I-STAT 136 mmol/l      Potassium, i-STAT 4 9 mmol/L      Calcium, Ionized i-STAT 1 22 mmol/L      Hct, i-STAT 31 %      Hgb, i-STAT 10 5 g/dl      Glucose, i-STAT 99 mg/dl      Specimen Type VENOUS                 XR abdomen 1 view kub   Final Result by Francisco Javier Joseph MD (03/19 1006)      Stool in the right hemiabdomen predominant  Large hiatal hernia  Workstation performed: [id number]         XR chest 1 view portable   Final Result by Emilie Bae MD (03/19 9819)      Minimal left base atelectasis  Large hiatal hernia                 Workstation performed: [id number]                    Procedures  CriticalCare Time    Date/Time: 3/18/2023 7:37 PM  Performed by: Ca Dasilva PA-C  Authorized by: Ca Dasilva PA-C     Critical care provider statement:     Critical care time (minutes):  62    Critical care start time:  3/18/2023 7:37 PM    Critical care end time:  3/18/2023 8:45 AM    Critical care was necessary to treat or prevent imminent or life-threatening deterioration of the following conditions:  Respiratory failure    Critical care was time spent personally by me on the following activities:  Blood draw for specimens, obtaining history from patient or surrogate, ordering and performing treatments and interventions, ordering and review of laboratory studies, development of treatment plan with patient or surrogate, ordering and review of radiographic studies, re-evaluation of patient's condition, review of old charts, evaluation of patient's response to treatment and examination of patient  Comments:      King Musa matt ordered d/t prior neb / mdi failure / subsequent need for BIPAP 14/6 with latter improvement of respiratory distress preventive of further decompensation  ECG 12 Lead Documentation Only    Date/Time: 3/18/2023 7:31 PM  Performed by: Dominique Ngo PA-C  Authorized by: Dominique Ngo PA-C     ECG reviewed by me, the ED Provider: yes    Patient location:  ED  Previous ECG:     Comparison to cardiac monitor: Yes    Interpretation:     Interpretation: normal    Rate:     ECG rate:  86    ECG rate assessment: normal    Rhythm:     Rhythm: sinus rhythm    Ectopy:     Ectopy: none    QRS:     QRS axis:  Normal  Conduction:     Conduction: normal    ST segments:     ST segments:  Normal  T waves:     T waves: normal    Comments:      Self interpreted by me  NO ST ELEV, Depression or TWI             ED Course  ED Course as of 03/19/23 0027   Sat Mar 18, 2023   1952 Pt getting silver neb now   2046 Pt still w/ dyspnea / bipap ordered   2105 Pt on BIPAP and marginally improving                                               Medical Decision Making  Required critical care / King Musa neb and BIPAP to reverse respiratory distress and impending respiratory failure  She is a level 3 DNR DNI  Improved prior to ED departure  Discussed w/ ICU team for admission  Stable and appropriate for SD 2 / Admitted under slim post conversation w/ CCU  Respiratory distress: acute illness or injury that poses a threat to life or bodily functions     Details: COPD Exacerbation  Amount and/or Complexity of Data Reviewed  Labs: ordered  Risk  Prescription drug management    Decision regarding hospitalization  Disposition  Final diagnoses:   Respiratory distress     Time reflects when diagnosis was documented in both MDM as applicable and the Disposition within this note     Time User Action Codes Description Comment    3/18/2023  7:53 PM Evelyn Meyers Add [R06 03] Respiratory distress     3/18/2023 10:18 PM Radha Salazar [J44 9] COPD (chronic obstructive pulmonary disease) ([address] )     3/18/2023 11:21 PM Wu Jd Add [K44 9] Large hiatal hernia       ED Disposition     ED Disposition   Admit    Condition   Stable    Date/Time   Sat Mar 18, 2023 10:15 PM    Comment   Case d/w Juan R Solitario of ICU who agrees for patient need for admission but to SD 2 on Service of Dr Maldonado Flow  Pt tolerated NC 3 L off if BIPAP  Follow-up Information    None         Current Discharge Medication List      CONTINUE these medications which have NOT CHANGED    Details   albuterol (2 5 mg/3 mL) 0 083 % nebulizer solution Take 2 5 mg by nebulization 4 (four) times a day  Refills: 0      albuterol (PROVENTIL HFA,VENTOLIN HFA) 90 mcg/act inhaler Inhale daily as needed       amLODIPine (NORVASC) 10 mg tablet daily  Refills: 0      benzonatate (TESSALON PERLES) 100 mg capsule Take 1 capsule (100 mg total) by mouth 3 (three) times a day as needed for cough  Qty: 20 capsule, Refills: 0    Associated Diagnoses: Pneumonia      budesonide (PULMICORT) 0 5 mg/2 mL nebulizer solution Take 0 5 mg by nebulization 2 (two) times a day Rinse mouth after use  ferrous sulfate 325 (65 Fe) mg tablet Take 1 tablet (325 mg total) by mouth daily with breakfast Do not start before March 1, 2023    Qty: 30 tablet, Refills: 0    Associated Diagnoses: Essential thrombocythemia (HCC)      guaiFENesin (MUCINEX) 600 mg 12 hr tablet Take 600 mg by mouth every 12 (twelve) hours      hydroxyurea (HYDREA) 500 mg capsule Take by mouth daily      ipratropium-albuterol (DUO-NEB) 0 5-2 5 mg/3 mL nebulizer solution Take 3 mL by nebulization every 6 (six) hours as needed for wheezing  Qty: 120 mL, Refills: 0    Associated Diagnoses: COPD (chronic obstructive pulmonary disease) (HCC)      mometasone-formoterol (DULERA) 200-5 MCG/ACT inhaler Inhale 2 puffs 2 (two) times a day Rinse mouth after use       montelukast (SINGULAIR) 10 mg tablet Take by mouth daily at bedtime       pantoprazole (PROTONIX) 40 mg tablet Take 1 tablet (40 mg total) by mouth daily before breakfast Do not start before March 1, 2023  Qty: 30 tablet, Refills: 0    Associated Diagnoses: Essential thrombocythemia ([address] ); Gastroesophageal reflux disease without esophagitis      potassium chloride (MICRO-K) 10 MEQ CR capsule Take 10 mEq by mouth 2 (two) times a day      XARELTO 20 MG tablet daily   Refills: 0         STOP taking these medications       predniSONE 20 mg tablet Comments:   Reason for Stopping:               No discharge procedures on file      PDMP Review     None          ED Provider  Electronically Signed by           Fermin Winkler PA-C  03/19/23 2016

## 2023-03-19 LAB
4HR DELTA HS TROPONIN: -3 NG/L
ANION GAP SERPL CALCULATED.3IONS-SCNC: 6 MMOL/L (ref 4–13)
BACTERIA UR QL AUTO: ABNORMAL /HPF
BASOPHILS # BLD AUTO: 0.01 THOUSANDS/ÂΜL (ref 0–0.1)
BASOPHILS NFR BLD AUTO: 0 % (ref 0–1)
BILIRUB UR QL STRIP: NEGATIVE
BUN SERPL-MCNC: 25 MG/DL (ref 5–25)
CALCIUM SERPL-MCNC: 8.3 MG/DL (ref 8.4–10.2)
CARDIAC TROPONIN I PNL SERPL HS: 6 NG/L
CHLORIDE SERPL-SCNC: 106 MMOL/L (ref 96–108)
CLARITY UR: CLEAR
CO2 SERPL-SCNC: 24 MMOL/L (ref 21–32)
COLOR UR: YELLOW
CREAT SERPL-MCNC: 0.71 MG/DL (ref 0.6–1.3)
EOSINOPHIL # BLD AUTO: 0 THOUSAND/ÂΜL (ref 0–0.61)
EOSINOPHIL NFR BLD AUTO: 0 % (ref 0–6)
ERYTHROCYTE [DISTWIDTH] IN BLOOD BY AUTOMATED COUNT: 16.3 % (ref 11.6–15.1)
GFR SERPL CREATININE-BSD FRML MDRD: 72 ML/MIN/1.73SQ M
GLUCOSE SERPL-MCNC: 177 MG/DL (ref 65–140)
GLUCOSE UR STRIP-MCNC: NEGATIVE MG/DL
HCT VFR BLD AUTO: 26.1 % (ref 34.8–46.1)
HGB BLD-MCNC: 8.3 G/DL (ref 11.5–15.4)
HGB UR QL STRIP.AUTO: NEGATIVE
IMM GRANULOCYTES # BLD AUTO: 0.1 THOUSAND/UL (ref 0–0.2)
IMM GRANULOCYTES NFR BLD AUTO: 1 % (ref 0–2)
KETONES UR STRIP-MCNC: ABNORMAL MG/DL
L PNEUMO1 AG UR QL IA.RAPID: NEGATIVE
LEUKOCYTE ESTERASE UR QL STRIP: NEGATIVE
LYMPHOCYTES # BLD AUTO: 0.25 THOUSANDS/ÂΜL (ref 0.6–4.47)
LYMPHOCYTES NFR BLD AUTO: 3 % (ref 14–44)
MAGNESIUM SERPL-MCNC: 2.3 MG/DL (ref 1.9–2.7)
MAGNESIUM SERPL-MCNC: 2.4 MG/DL (ref 1.9–2.7)
MCH RBC QN AUTO: 36.1 PG (ref 26.8–34.3)
MCHC RBC AUTO-ENTMCNC: 31.8 G/DL (ref 31.4–37.4)
MCV RBC AUTO: 114 FL (ref 82–98)
MONOCYTES # BLD AUTO: 0.06 THOUSAND/ÂΜL (ref 0.17–1.22)
MONOCYTES NFR BLD AUTO: 1 % (ref 4–12)
NEUTROPHILS # BLD AUTO: 8.23 THOUSANDS/ÂΜL (ref 1.85–7.62)
NEUTS SEG NFR BLD AUTO: 95 % (ref 43–75)
NITRITE UR QL STRIP: NEGATIVE
NON-SQ EPI CELLS URNS QL MICRO: ABNORMAL /HPF
NRBC BLD AUTO-RTO: 0 /100 WBCS
PH UR STRIP.AUTO: 6 [PH]
PLATELET # BLD AUTO: 290 THOUSANDS/UL (ref 149–390)
PMV BLD AUTO: 9.3 FL (ref 8.9–12.7)
POTASSIUM SERPL-SCNC: 4.8 MMOL/L (ref 3.5–5.3)
PROCALCITONIN SERPL-MCNC: 0.07 NG/ML
PROT UR STRIP-MCNC: ABNORMAL MG/DL
RBC # BLD AUTO: 2.3 MILLION/UL (ref 3.81–5.12)
RBC #/AREA URNS AUTO: ABNORMAL /HPF
S PNEUM AG UR QL: NEGATIVE
SODIUM SERPL-SCNC: 136 MMOL/L (ref 135–147)
SP GR UR STRIP.AUTO: 1.02 (ref 1–1.03)
UROBILINOGEN UR STRIP-ACNC: <2 MG/DL
WBC # BLD AUTO: 8.65 THOUSAND/UL (ref 4.31–10.16)
WBC #/AREA URNS AUTO: ABNORMAL /HPF

## 2023-03-19 RX ORDER — GUAIFENESIN 600 MG/1
600 TABLET, EXTENDED RELEASE ORAL EVERY 12 HOURS SCHEDULED
Status: DISCONTINUED | OUTPATIENT
Start: 2023-03-19 | End: 2023-03-19

## 2023-03-19 RX ORDER — POLYETHYLENE GLYCOL 3350 17 G/17G
17 POWDER, FOR SOLUTION ORAL 2 TIMES DAILY
Status: DISCONTINUED | OUTPATIENT
Start: 2023-03-19 | End: 2023-03-21 | Stop reason: HOSPADM

## 2023-03-19 RX ORDER — LEVALBUTEROL 1.25 MG/.5ML
1.25 SOLUTION, CONCENTRATE RESPIRATORY (INHALATION) EVERY 8 HOURS PRN
Status: DISCONTINUED | OUTPATIENT
Start: 2023-03-19 | End: 2023-03-21 | Stop reason: HOSPADM

## 2023-03-19 RX ORDER — LEVALBUTEROL 1.25 MG/.5ML
1.25 SOLUTION, CONCENTRATE RESPIRATORY (INHALATION)
Status: DISCONTINUED | OUTPATIENT
Start: 2023-03-19 | End: 2023-03-21 | Stop reason: HOSPADM

## 2023-03-19 RX ADMIN — IPRATROPIUM BROMIDE 0.5 MG: 0.5 SOLUTION RESPIRATORY (INHALATION) at 13:02

## 2023-03-19 RX ADMIN — IPRATROPIUM BROMIDE 0.5 MG: 0.5 SOLUTION RESPIRATORY (INHALATION) at 19:29

## 2023-03-19 RX ADMIN — GUAIFENESIN 600 MG: 600 TABLET ORAL at 22:23

## 2023-03-19 RX ADMIN — LEVALBUTEROL 1.25 MG: 1.25 SOLUTION, CONCENTRATE RESPIRATORY (INHALATION) at 08:03

## 2023-03-19 RX ADMIN — BUDESONIDE 0.5 MG: 0.5 INHALANT ORAL at 08:03

## 2023-03-19 RX ADMIN — METHYLPREDNISOLONE SODIUM SUCCINATE 40 MG: 40 INJECTION, POWDER, FOR SOLUTION INTRAMUSCULAR; INTRAVENOUS at 05:16

## 2023-03-19 RX ADMIN — MONTELUKAST 10 MG: 10 TABLET, FILM COATED ORAL at 22:23

## 2023-03-19 RX ADMIN — METHYLPREDNISOLONE SODIUM SUCCINATE 40 MG: 40 INJECTION, POWDER, FOR SOLUTION INTRAMUSCULAR; INTRAVENOUS at 14:31

## 2023-03-19 RX ADMIN — LEVALBUTEROL 1.25 MG: 1.25 SOLUTION, CONCENTRATE RESPIRATORY (INHALATION) at 19:29

## 2023-03-19 RX ADMIN — AMLODIPINE BESYLATE 10 MG: 5 TABLET ORAL at 10:07

## 2023-03-19 RX ADMIN — FLUTICASONE FUROATE AND VILANTEROL TRIFENATATE 1 PUFF: 200; 25 POWDER RESPIRATORY (INHALATION) at 12:37

## 2023-03-19 RX ADMIN — METHYLPREDNISOLONE SODIUM SUCCINATE 40 MG: 40 INJECTION, POWDER, FOR SOLUTION INTRAMUSCULAR; INTRAVENOUS at 22:23

## 2023-03-19 RX ADMIN — RIVAROXABAN 20 MG: 20 TABLET, FILM COATED ORAL at 16:38

## 2023-03-19 RX ADMIN — BUDESONIDE 0.5 MG: 0.5 INHALANT ORAL at 19:30

## 2023-03-19 RX ADMIN — PANTOPRAZOLE SODIUM 40 MG: 40 TABLET, DELAYED RELEASE ORAL at 10:07

## 2023-03-19 RX ADMIN — LEVALBUTEROL 1.25 MG: 1.25 SOLUTION, CONCENTRATE RESPIRATORY (INHALATION) at 13:02

## 2023-03-19 RX ADMIN — HYDROXYUREA 500 MG: 500 CAPSULE ORAL at 10:07

## 2023-03-19 RX ADMIN — IPRATROPIUM BROMIDE 0.5 MG: 0.5 SOLUTION RESPIRATORY (INHALATION) at 08:03

## 2023-03-19 RX ADMIN — GUAIFENESIN 600 MG: 600 TABLET ORAL at 10:07

## 2023-03-19 RX ADMIN — POLYETHYLENE GLYCOL 3350 17 G: 17 POWDER, FOR SOLUTION ORAL at 17:40

## 2023-03-19 NOTE — ASSESSMENT & PLAN NOTE
· Patient with known history of large hiatal hernia  · GI and surgery has been consulted-ensure she is a candidate for any kind of surgical repair

## 2023-03-19 NOTE — PLAN OF CARE
Problem: MOBILITY - ADULT  Goal: Maintain or return to baseline ADL function  Description: INTERVENTIONS:  -  Assess patient's ability to carry out ADLs; assess patient's baseline for ADL function and identify physical deficits which impact ability to perform ADLs (bathing, care of mouth/teeth, toileting, grooming, dressing, etc )  - Assess/evaluate cause of self-care deficits   - Assess range of motion  - Assess patient's mobility; develop plan if impaired  - Assess patient's need for assistive devices and provide as appropriate  - Encourage maximum independence but intervene and supervise when necessary  - Involve family in performance of ADLs  - Assess for home care needs following discharge   - Consider OT consult to assist with ADL evaluation and planning for discharge  - Provide patient education as appropriate  Outcome: Progressing  Goal: Maintains/Returns to pre admission functional level  Description: INTERVENTIONS:  - Perform BMAT or MOVE assessment daily    - Set and communicate daily mobility goal to care team and patient/family/caregiver     - Collaborate with rehabilitation services on mobility goals if consulted    - Record patient progress and toleration of activity level   Outcome: Progressing     Problem: INFECTION - ADULT  Goal: Absence or prevention of progression during hospitalization  Description: INTERVENTIONS:  - Assess and monitor for signs and symptoms of infection  - Monitor lab/diagnostic results  - Monitor all insertion sites, i e  indwelling lines, tubes, and drains  - Monitor endotracheal if appropriate and nasal secretions for changes in amount and color  - [place] appropriate cooling/warming therapies per order  - Administer medications as ordered  - Instruct and encourage patient and family to use good hand hygiene technique  - Identify and instruct in appropriate isolation precautions for identified infection/condition  Outcome: Progressing     Problem: SAFETY ADULT  Goal: Maintain or return to baseline ADL function  Description: INTERVENTIONS:  -  Assess patient's ability to carry out ADLs; assess patient's baseline for ADL function and identify physical deficits which impact ability to perform ADLs (bathing, care of mouth/teeth, toileting, grooming, dressing, etc )  - Assess/evaluate cause of self-care deficits   - Assess range of motion  - Assess patient's mobility; develop plan if impaired  - Assess patient's need for assistive devices and provide as appropriate  - Encourage maximum independence but intervene and supervise when necessary  - Involve family in performance of ADLs  - Assess for home care needs following discharge   - Consider OT consult to assist with ADL evaluation and planning for discharge  - Provide patient education as appropriate  Outcome: Progressing  Goal: Maintains/Returns to pre admission functional level  Description: INTERVENTIONS:  - Perform BMAT or MOVE assessment daily    - Set and communicate daily mobility goal to care team and patient/family/caregiver     - Collaborate with rehabilitation services on mobility goals if consulted    - Out of bed for toileting  - Record patient progress and toleration of activity level   Outcome: Progressing  Goal: Patient will remain free of falls  Description: INTERVENTIONS:  - Educate patient/family on patient safety including physical limitations  - Instruct patient to call for assistance with activity   - Consult OT/PT to assist with strengthening/mobility   - Keep Call bell within reach  - Keep bed low and locked with side rails adjusted as appropriate  - Keep care items and personal belongings within reach  - Initiate and maintain comfort rounds  - Make Fall Risk Sign visible to staff    - Apply yellow socks and bracelet for high fall risk patients  - Consider moving patient to room near nurses station  Outcome: Progressing     Problem: DISCHARGE PLANNING  Goal: Discharge to home or other facility with appropriate resources  Description: INTERVENTIONS:  - Identify barriers to discharge w/patient and caregiver  - Arrange for needed discharge resources and transportation as appropriate  - Identify discharge learning needs (meds, wound care, etc )  - Arrange for interpretive services to assist at discharge as needed  - Refer to Case Management Department for coordinating discharge planning if the patient needs post-hospital services based on physician/advanced practitioner order or complex needs related to functional status, cognitive ability, or social support system  Outcome: Progressing     Problem: Knowledge Deficit  Goal: Patient/family/caregiver demonstrates understanding of disease process, treatment plan, medications, and discharge instructions  Description: Complete learning assessment and assess knowledge base    Interventions:  - Provide teaching at level of understanding  - Provide teaching via preferred learning methods  Outcome: Progressing

## 2023-03-19 NOTE — ASSESSMENT & PLAN NOTE
· Patient with COPD exacerbation, patient required BiPAP initially-only on nasal cannula  · Patient was given dexamethasone x1 in the emergency room    Currently getting Solu-Medrol 40 mg 3 times daily-start tapering from tomorrow  · Respiratory protocol  · Airway clearance protocol  · Continue with Mucinex  · Pulmonology evaluation  · Continue with supplemental oxygen

## 2023-03-19 NOTE — ASSESSMENT & PLAN NOTE
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

## 2023-03-19 NOTE — PLAN OF CARE
Problem: MOBILITY - ADULT  Goal: Maintain or return to baseline ADL function  Description: INTERVENTIONS:  -  Assess patient's ability to carry out ADLs; assess patient's baseline for ADL function and identify physical deficits which impact ability to perform ADLs (bathing, care of mouth/teeth, toileting, grooming, dressing, etc )  - Assess/evaluate cause of self-care deficits   - Assess range of motion  - Assess patient's mobility; develop plan if impaired  - Assess patient's need for assistive devices and provide as appropriate  - Encourage maximum independence but intervene and supervise when necessary  - Involve family in performance of ADLs  - Assess for home care needs following discharge   - Consider OT consult to assist with ADL evaluation and planning for discharge  - Provide patient education as appropriate  Outcome: Progressing  Goal: Maintains/Returns to pre admission functional level  Description: INTERVENTIONS:  - Perform BMAT or MOVE assessment daily    - Set and communicate daily mobility goal to care team and patient/family/caregiver  - Collaborate with rehabilitation services on mobility goals if consulted  - Perform Range of Motion 2 times a day  - Reposition patient every 2 hours    - Dangle patient 1 times a day  - Stand patient 2 times a day  - Ambulate patient 3 times a day  - Out of bed to chair 2 times a day   - Out of bed for meals 2 times a day  - Out of bed for toileting  - Record patient progress and toleration of activity level   Outcome: Progressing     Problem: INFECTION - ADULT  Goal: Absence or prevention of progression during hospitalization  Description: INTERVENTIONS:  - Assess and monitor for signs and symptoms of infection  - Monitor lab/diagnostic results  - Monitor all insertion sites, i e  indwelling lines, tubes, and drains  - Monitor endotracheal if appropriate and nasal secretions for changes in amount and color  - [place] appropriate cooling/warming therapies per order  - Administer medications as ordered  - Instruct and encourage patient and family to use good hand hygiene technique  - Identify and instruct in appropriate isolation precautions for identified infection/condition  Outcome: Progressing     Problem: SAFETY ADULT  Goal: Maintain or return to baseline ADL function  Description: INTERVENTIONS:  -  Assess patient's ability to carry out ADLs; assess patient's baseline for ADL function and identify physical deficits which impact ability to perform ADLs (bathing, care of mouth/teeth, toileting, grooming, dressing, etc )  - Assess/evaluate cause of self-care deficits   - Assess range of motion  - Assess patient's mobility; develop plan if impaired  - Assess patient's need for assistive devices and provide as appropriate  - Encourage maximum independence but intervene and supervise when necessary  - Involve family in performance of ADLs  - Assess for home care needs following discharge   - Consider OT consult to assist with ADL evaluation and planning for discharge  - Provide patient education as appropriate  Outcome: Progressing  Goal: Maintains/Returns to pre admission functional level  Description: INTERVENTIONS:  - Perform BMAT or MOVE assessment daily    - Set and communicate daily mobility goal to care team and patient/family/caregiver     - Collaborate with rehabilitation services on mobility goals if consulted  - Out of bed for toileting  - Record patient progress and toleration of activity level   Outcome: Progressing  Goal: Patient will remain free of falls  Description: INTERVENTIONS:  - Educate patient/family on patient safety including physical limitations  - Instruct patient to call for assistance with activity   - Consult OT/PT to assist with strengthening/mobility   - Keep Call bell within reach  - Keep bed low and locked with side rails adjusted as appropriate  - Keep care items and personal belongings within reach  - Initiate and maintain comfort rounds  - Make Fall Risk Sign visible to staff  - Offer Toileting every 2 Hours, in advance of need  - Apply yellow socks and bracelet for high fall risk patients  - Consider moving patient to room near nurses station  Outcome: Progressing     Problem: DISCHARGE PLANNING  Goal: Discharge to home or other facility with appropriate resources  Description: INTERVENTIONS:  - Identify barriers to discharge w/patient and caregiver  - Arrange for needed discharge resources and transportation as appropriate  - Identify discharge learning needs (meds, wound care, etc )  - Arrange for interpretive services to assist at discharge as needed  - Refer to Case Management Department for coordinating discharge planning if the patient needs post-hospital services based on physician/advanced practitioner order or complex needs related to functional status, cognitive ability, or social support system  Outcome: Progressing     Problem: Knowledge Deficit  Goal: Patient/family/caregiver demonstrates understanding of disease process, treatment plan, medications, and discharge instructions  Description: Complete learning assessment and assess knowledge base    Interventions:  - Provide teaching at level of understanding  - Provide teaching via preferred learning methods  Outcome: Progressing

## 2023-03-19 NOTE — ASSESSMENT & PLAN NOTE
· 1/21/23 -- CT chest w contrast at [facility] -- Large hiatal hernia consisting of a intrathoracic stomach, large segment of   transverse colon, and contiguous mesentery and omentum  · Large hiatal hernia noted again on CXR 3/18 in ER   · Concern there is some degree of SOB, tachypnea from large hernia   · Would benefit from GenSx consult to determine if she is a surgical candidate   · Serial abd assessments

## 2023-03-19 NOTE — SPEECH THERAPY NOTE
Speech Language/Pathology    Speech-Language Pathology Bedside Swallow Evaluation      Patient Name: Monserrat Woodard    [id number] Date: 3/19/2023     Problem List  Active Problems:    Gastroesophageal reflux disease    Large hiatal hernia    Anemia    COPD (chronic obstructive pulmonary disease) (HCC)    Hypertension    Pulmonary emboli (HCC)    Essential thrombocythemia (HCC)    CML (chronic myelocytic leukemia) (HCC)    SIRS (systemic inflammatory response syndrome) (HCC)    Constipation      Past Medical History  Past Medical History:   Diagnosis Date   • COPD (chronic obstructive pulmonary disease) ([facility] [address] [facility] )    • Hiatal hernia    • Hypertension    • [facility] )     Multiple sites removed   • Pneumonia    • Pulmonary embolism (HCC)     on Xarelto   • Skin cancer        Past Surgical History  Past Surgical History:   Procedure Laterality Date   • APPENDECTOMY     • HYSTERECTOMY     • GA ESOPHAGOGASTRODUODENOSCOPY TRANSORAL DIAGNOSTIC N/A 5/1/2019    Procedure: ESOPHAGOGASTRODUODENOSCOPY (EGD); Surgeon: Mykel Cueto MD;  Location: [facility] OR;  Service: Gastroenterology   • SKIN CANCER EXCISION     • SKIN CANCER EXCISION     • UPPER GASTROINTESTINAL ENDOSCOPY         Summary   Pt presented with s/s suggestive of mild  oral and suspected minimal pharyngeal dysphagia  Pt w/ limited dentition w/ subsequent reduced bite strength and prolonged mastication/bolus formation  Piecemeal transfers w/ min-mlid oral residue, residue eventually cleared  Swallows suspected fairly prompt  No overt s/s aspiration across trials  S/w pt regarding potential dysphagia diet modifications to ease oral difficulties (resuming diet from prior admission), pt agreeable/preference for modifications at this time  Education initiated on strategies to optimize swallow safety and s/s aspiration to notify medical team of should they arise  Pt demonstrated understanding, denied questions/concerns at this time       Risk/s for Aspiration: Mild     Recommended Diet: soft/level 3 diet and thin liquids   Recommended Form of Meds: whole with liquid   Aspiration precautions and swallowing strategies: upright posture, only feed when fully alert, slow rate of feeding and small bites/sips  Other Recommendations: Continue frequent oral care        Current Medical Status  Pt is a 80 y o  female who presented to [address] [place] [address] with  a past medical history of GERD, anemia, COPD, hypertension, PE on Xarelto, CML who presents to the ER from home with her son with a chief complaint of shortness of breath  Notably the patient was recently discharged from this facility on 3/9 for pneumonia  The patient and her son state that she has had shortness of breath since her discharge, as well as significant constipation which has been ongoing for several weeks  She also states that since she got her glasses several weeks ago, her vision has not been the same  At home, she had worsening shortness of breath this morning, which was unrelieved despite nebulizers and rescue inhaler at home per son  In the ER, she was tachypneic and had an increased work of breathing, and was placed on BiPAP  Chest x-ray shows large hiatal hernia, no large infiltrates or effusions  SIRS positive with leukocytosis, tachypnea  After receiving hour-long nebulizer and 10 mg IV Decadron, she was able to be weaned from BiPAP to 3 L nasal cannula with SPO2 99%  VBG 7 39/42/34/26  On exam, she is on nasal cannula, she states that her breathing is significantly improved and almost at her baseline, she endorses constipation for several weeks  Additionally, she states that since she got her glasses several months ago, her vision has not been the same, however head has not changed in the recent days to weeks  She is admitted to stepdown level to for close monitoring of respiratory status, scheduled nebulizers, scheduled steroids        Current Precautions:       Allergies:  No known food allergies    Past medical history:  Please see H&P for details    Special Studies:  CXR and XR abdomen pending read    Social/Education/Vocational Hx:  Pt lives with family    Swallow Information   Current Risks for Dysphagia & Aspiration: known history of dysphagia and respiratory status  Current Symptoms/Concerns: change in respiratory status  Current Diet: NPO   Baseline Diet: regular diet and thin liquids      Baseline Assessment   Behavior/Cognition: alert  Speech/Language Status: able to participate in conversation and able to follow commands  Patient Positioning: upright in bed  Pain Status/Interventions/Response to Interventions:   No report of or nonverbal indications of pain  Swallow Mechanism Exam  Facial: symmetrical  Labial: WFL  Lingual: WFL  Velum: symmetrical  Mandible: adequate ROM  Dentition: upper dentures and edentulous lower  Vocal quality:clear/adequate   Volitional Cough: strong/productive   Respiratory Status: on 3L O2       Consistencies Assessed and Performance   Consistencies Administered: thin liquids, puree, soft solids and hard solids    Oral Stage: mild  Mastication was mild prolonged/reduced adequate with the materials administered today  Bolus formation also prolonged, piecemeal transfers w/ mild oral residue  Oral residue eventually cleared  No overt s/s reduced oral control  Pharyngeal Stage: minimal   Swallow Mechanics:  Swallowing initiation appeared fairly prompt  Laryngeal rise was palpated and judged to be within fair  No coughing, throat clearing, change in vocal quality or respiratory status noted today       Esophageal Concerns: none reported    Strategies and Efficacy: -     Summary and Recommendations (see above)    Results Reviewed with: patient, RN and MD     Treatment Recommended: Yes     Frequency of treatment: As able/approrpriate     Patient Stated Goal: "I think it's too much medicine"     Dysphagia LTG  -Patient will demonstrate safe and effective oral intake (without overt s/s significant oral/pharyngeal dysphagia including s/s penetration or aspiration) for the highest appropriate diet level       Short Term Goals:  -Pt will tolerate Dysphagia 3/advanced (dental soft) diet and honey thick nectar thick thin liquid with no significant s/s oral or pharyngeal dysphagia across 1-3 diagnostic session/s     -Patient will tolerate trials of upgraded food and/or liquid texture with no significant s/s of oral or pharyngeal dysphagia including aspiration across 1-3 diagnostic sessions     -Patient will comply with a Video/Modified Barium Swallow study for more complete assessment of swallowing anatomy/physiology/aspiration risk and to assess efficacy of treatment techniques so as to best guide treatment plan          Speech Therapy Prognosis   Prognosis: good    Prognosis Considerations: age, medical status, prior medical history and respiratory status

## 2023-03-19 NOTE — ASSESSMENT & PLAN NOTE
· Complains of constipation for several weeks   · Son at bedside reports decreased PO intake at baseline   · Obtain KUB for r/o obstruction   · Serial abd assessments   · Consult General Sx for input

## 2023-03-19 NOTE — ASSESSMENT & PLAN NOTE
· Noted in 2018   · Cx on xarelto -- continue IP   · Endorses SOB, however no CP, no tachycardia and reportedly compliant with xarelto at home, would doubt PE however may consider if does not improve

## 2023-03-19 NOTE — CONSULTS
Consultation - [address] Gastroenterology     Lyudmila Thibodeaux 80 y o  female MRN: [id number]  Unit/Bed#: -01 SDU Encounter: [id number]    Consults    ASSESSMENT and PLAN    1  Constipation  Longstanding bowel movement pattern has been on the constipated side  Seems of gotten worse since recent discharge after being admitted for probable diverticular bleed   -Start MiraLAX twice a day  -Follow exam and bowel movements    2  Large hiatal hernia  Chronic issue  Seen by surgery  Not a surgical candidate    3  Recent hospitalization with diverticular bleed  No further bleeding  Hemoglobin 8 3  Iron panel more consistent with anemia of chronic disease  -Follow for now    4  COPD exacerbation  Per pulmonary    Chief Complaint   Patient presents with   • Shortness of Breath     This afternoon started with SOB  Hx COPD  Pt states she has not had a BM in over a week, and she feels like if she had a BM she could breathe better       Physician Requesting Consult: Mellody Nageotte, MD    HPI  Lyudmila Thibodeaux is a 80y o  year old female with a history of COPD and pulmonary embolism on anticoagulation who was last seen by us several weeks ago when she was hospitalized with lower GI bleed  She has a known history of a large hiatal hernia which she has been following for a while  It is not symptomatic and she is not a surgical candidate because of her age and concomitant medical problems  She was seen in the hospital 2 weeks ago when she presented with lower GI bleeding  Presumably this was related to diverticulosis  It stopped  Discussions were held with the patient and family about colonoscopy but it was decided because of her age and her concomitant medical conditions we would not put her through  Unfortunately since discharge she has not moved her bowels  She usually runs on the constipated side with having about 2 bowel movements a week    She reports that because of the bleeding she has been apprehensive about straining or taking anything to help her move her bowels  She feels abdominal bloated  She denies any upper GI symptoms  Historical Information   Past Medical History:   Diagnosis Date   • COPD (chronic obstructive pulmonary disease) ([facility] )    • Hiatal hernia    • Hypertension    • Melanoma in situ of the skin [facility])     Multiple sites removed   • Pneumonia    • Pulmonary embolism ([facility] )     on Xarelto   • Skin cancer      Past Surgical History:   Procedure Laterality Date   • APPENDECTOMY     • HYSTERECTOMY     • NM ESOPHAGOGASTRODUODENOSCOPY TRANSORAL DIAGNOSTIC N/A 2019    Procedure: ESOPHAGOGASTRODUODENOSCOPY (EGD);   Surgeon: Paula Hernandes MD;  Location: [facility] OR;  Service: Gastroenterology   • SKIN CANCER EXCISION     • SKIN CANCER EXCISION     • UPPER GASTROINTESTINAL ENDOSCOPY       Social History   Social History     Substance and Sexual Activity   Alcohol Use Never     Social History     Substance and Sexual Activity   Drug Use Never     Social History     Tobacco Use   Smoking Status Former   • Types: Cigarettes   • Quit date:    • Years since quittin 2   Smokeless Tobacco Never     Family History   Problem Relation Age of Onset   • Heart disease Mother    • Diabetes Father    • Diabetes Sister    • Cancer Brother    • Colon polyps Brother    • Diabetes Brother        Meds/Allergies     Current Facility-Administered Medications   Medication Dose Route Frequency   • amLODIPine (NORVASC) tablet 10 mg  10 mg Oral Daily   • budesonide (PULMICORT) inhalation solution 0 5 mg  0 5 mg Nebulization BID   • fluticasone-vilanterol 200-25 mcg/actuation 1 puff  1 puff Inhalation Daily   • guaiFENesin (MUCINEX) 12 hr tablet 600 mg  600 mg Oral Q12H [address]   • hydroxyurea (HYDREA) capsule 500 mg  500 mg Oral Daily   • ipratropium (ATROVENT) 0 02 % inhalation solution 0 5 mg  0 5 mg Nebulization TID   • levalbuterol (XOPENEX) inhalation solution 1 25 mg  1 25 mg Nebulization TID   • methylPREDNISolone sodium succinate (Solu-MEDROL) injection 40 mg  40 mg Intravenous Q8H [facility]   • montelukast (SINGULAIR) tablet 10 mg  10 mg Oral HS   • pantoprazole (PROTONIX) EC tablet 40 mg  40 mg Oral Daily Before Breakfast   • polyethylene glycol (MIRALAX) packet 17 g  17 g Oral BID   • rivaroxaban (XARELTO) tablet 20 mg  20 mg Oral Daily With Dinner     Medications Prior to Admission   Medication   • albuterol (2 5 mg/3 mL) 0 083 % nebulizer solution   • albuterol (PROVENTIL HFA,VENTOLIN HFA) 90 mcg/act inhaler   • amLODIPine (NORVASC) 10 mg tablet   • benzonatate (TESSALON PERLES) 100 mg capsule   • budesonide (PULMICORT) 0 5 mg/2 mL nebulizer solution   • ferrous sulfate 325 (65 Fe) mg tablet   • guaiFENesin (MUCINEX) 600 mg 12 hr tablet   • hydroxyurea (HYDREA) 500 mg capsule   • ipratropium-albuterol (DUO-NEB) 0 5-2 5 mg/3 mL nebulizer solution   • mometasone-formoterol (DULERA) 200-5 MCG/ACT inhaler   • montelukast (SINGULAIR) 10 mg tablet   • pantoprazole (PROTONIX) 40 mg tablet   • potassium chloride (MICRO-K) 10 MEQ CR capsule   • [] predniSONE 20 mg tablet   • XARELTO 20 MG tablet       No Known Allergies    PHYSICAL EXAM    Blood pressure 129/63, pulse 82, temperature 97 8 °F (36 6 °C), temperature source Oral, resp  rate (!) 42, height 4' 10" (1 473 m), weight 48 7 kg (107 lb 5 8 oz), SpO2 100 %  Body mass index is 22 44 kg/m²  General Appearance: NAD, cooperative, alert  Eyes: Anicteric, PERRLA, EOMI  ENT:  Normocephalic, atraumatic, normal mucosa  Neck:  Supple, symmetrical, trachea midline  Resp:   Mild bilateral wheezing  CV:  S1 S2, Regular rate and rhythm; no murmur, rub, or gallop  GI:  Soft, non-tender, non-distended; normal bowel sounds; no masses, no organomegaly   Rectal: Deferred  Musculoskeletal: No cyanosis, clubbing or edema  Normal ROM    Skin:  No jaundice, rashes, or lesions   Heme/Lymph: No palpable cervical lymphadenopathy  Psych: Normal affect, good eye contact  Neuro: No gross deficits, AAOx3    Lab Results   Component Value Date    GLUCOSE 99 03/18/2023    CALCIUM 8 3 (L) 03/19/2023     03/16/2015    K 4 8 03/19/2023    CO2 24 03/19/2023     03/19/2023    BUN 25 03/19/2023    CREATININE 0 71 03/19/2023     Lab Results   Component Value Date    WBC 8 65 03/19/2023    HGB 8 3 (L) 03/19/2023    HCT 26 1 (L) 03/19/2023     (H) 03/19/2023     03/19/2023     Lab Results   Component Value Date    ALT 13 03/18/2023    AST 13 03/18/2023    ALKPHOS 84 03/18/2023    BILITOT 0 3 03/16/2015       Lab Results   Component Value Date    LIPASE 11 03/06/2023     Lab Results   Component Value Date    IRON 12 (L) 03/08/2023    TIBC 211 (L) 03/08/2023    FERRITIN 93 03/08/2023     Lab Results   Component Value Date    INR 1 65 (H) 03/18/2023       Imaging Studies: I have personally reviewed pertinent reports  EKG, Pathology, and Other Studies: I have personally reviewed pertinent reports  REVIEW OF SYSTEMS:    CONSTITUTIONAL: Denies any fever, chills, rigors, and weight loss  HEENT: No earache or tinnitus  Denies hearing loss or visual disturbances  CARDIOVASCULAR: No chest pain or palpitations  RESPIRATORY: Denies any cough, hemoptysis, shortness of breath or dyspnea on exertion  GASTROINTESTINAL: As noted in the History of Present Illness  GENITOURINARY: No problems with urination  Denies any hematuria or dysuria  NEUROLOGIC: No dizziness or vertigo, denies headaches  MUSCULOSKELETAL: Denies any muscle or joint pain  SKIN: Denies skin rashes or itching  ENDOCRINE: Denies excessive thirst  Denies intolerance to heat or cold  PSYCHOSOCIAL: Denies depression or anxiety  Denies any recent memory loss

## 2023-03-19 NOTE — ASSESSMENT & PLAN NOTE
· Patient with chronic anemia    Currently hemoglobin at baseline  · Yesterday hemoglobin was  9 7 which is an outlier but in the records  · Monitor  · No signs of active bleed

## 2023-03-19 NOTE — H&P
Abhay Saniya  H&P- Monserrat Woodard 3/22/1926, 80 y o  female MRN: [id number]  Unit/Bed#: -01 SDU Encounter: [id number]  Primary Care Provider: Dimitris Gaitan   Date and time admitted to hospital: 3/18/2023  7:36 PM    Constipation  Assessment & Plan  · Complains of constipation for several weeks   · Son at bedside reports decreased PO intake at baseline   · Obtain KUB for r/o obstruction   · Serial abd assessments   · Consult General Sx for input     SIRS (systemic inflammatory response syndrome) (HCC)  Assessment & Plan  · POA AEB tachypnea, leukocytosis   · Unclear etiology, COPD AE, infectious process, or other   · Procal neg -- recheck in AM   · Check COVID, flu swab   · Send BC, strep, legionella, sputum cx   · Hold on abx for now as no clear infiltrate or effusion on CXR and recently completed abx therapies   · Afebrile, HD stable   · Trend endpoints     CML (chronic myelocytic leukemia) ([facility] )  Assessment & Plan  · Follows with [facility]   · Continue hydroxyurea     Pulmonary emboli ([facility] )  Assessment & Plan  · Noted in 2018   · Cx on xarelto -- continue IP   · Endorses SOB, however no CP, no tachycardia and reportedly compliant with xarelto at home, would doubt PE however may consider if does not improve     Hypertension  Assessment & Plan  · Cont home norvasc for goal SBP <160    COPD (chronic obstructive pulmonary disease) ([facility] )  Assessment & Plan  · Follows with Dr Bennie Lopez at [facility]   · No PFTs on file   · Home regimen: pulmicort bid, mucinex bid, dulera bid, singular qd, PRN albuterol   · SOB since her discharge from [address] [place] [address] 3/9 for PNA with reports of constipation for several weeks   · VBG on admission -- 7 39/42 7/34/26  · Unclear if this is COPD AE   · Brief bipap in the ER for tachypnea, WOB, though was able to be weaned to 3L NC with spo2 high 90s  · bipap PRN for increased WOB   · Start solumedrol 40mg q8h   · Schedule xopenex, atrovent   · Cont home nebs   · Wean fio2 for goal spo2 > 88%    Anemia  Assessment & Plan  · Chronic in nature, baseline around 8's   · Admission hgb 9 7    Large hiatal hernia  Assessment & Plan  · 1/21/23 -- CT chest w contrast at [facility] -- Large hiatal hernia consisting of a intrathoracic stomach, large segment of   transverse colon, and contiguous mesentery and omentum  · Large hiatal hernia noted again on CXR 3/18 in ER   · Concern there is some degree of SOB, tachypnea from large hernia   · Would benefit from GenSx consult to determine if she is a surgical candidate   · Serial abd assessments     Gastroesophageal reflux disease  Assessment & Plan  · Cont home PPI      -------------------------------------------------------------------------------------------------------------  Chief Complaint: SOB for several days, constipation for several weeks     History of Present Illness   HX and PE limited by: NA  Sánchez Petersen is a 80 y o  female with a past medical history of GERD, anemia, COPD, hypertension, PE on Xarelto, CML who presents to the ER from home with her son with a chief complaint of shortness of breath  Notably the patient was recently discharged from this facility on 3/9 for pneumonia  The patient and her son state that she has had shortness of breath since her discharge, as well as significant constipation which has been ongoing for several weeks  She also states that since she got her glasses several weeks ago, her vision has not been the same  At home, she had worsening shortness of breath this morning, which was unrelieved despite nebulizers and rescue inhaler at home per son  In the ER, she was tachypneic and had an increased work of breathing, and was placed on BiPAP  Chest x-ray shows large hiatal hernia, no large infiltrates or effusions  SIRS positive with leukocytosis, tachypnea    After receiving hour-long nebulizer and 10 mg IV Decadron, she was able to be weaned from BiPAP to 3 L nasal cannula with SPO2 99%  VBG 7 39/42/34/26  On exam, she is on nasal cannula, she states that her breathing is significantly improved and almost at her baseline, she endorses constipation for several weeks  Additionally, she states that since she got her glasses several months ago, her vision has not been the same, however head has not changed in the recent days to weeks  She is admitted to stepdown level to for close monitoring of respiratory status, scheduled nebulizers, scheduled steroids  History obtained from chart review and the patient   -------------------------------------------------------------------------------------------------------------  Dispo: Admit to Stepdown Level 2    Code Status: Level 3 - DNAR and DNI  --------------------------------------------------------------------------------------------------------------  Review of Systems   Constitutional: Negative for fatigue and fever  HENT: Negative  Respiratory: Positive for shortness of breath  Negative for cough, choking and chest tightness  Cardiovascular: Negative for chest pain, palpitations and leg swelling  Gastrointestinal: Positive for constipation  Negative for abdominal distention, abdominal pain, blood in stool, diarrhea, nausea and vomiting  Genitourinary: Negative  Musculoskeletal: Negative  Skin: Negative  Neurological: Positive for dizziness (with SOB episodes per son)  Psychiatric/Behavioral: Negative  All other systems reviewed and are negative  A 12-point, complete review of systems was reviewed and negative except as stated above     Physical Exam  Vitals and nursing note reviewed  Constitutional:       General: She is awake  She is not in acute distress  Appearance: She is well-developed  She is not ill-appearing, toxic-appearing or diaphoretic  Interventions: Nasal cannula in place  HENT:      Head: Normocephalic and atraumatic     Cardiovascular:      Rate and Rhythm: Normal rate and regular rhythm  Pulmonary:      Effort: Pulmonary effort is normal  No accessory muscle usage, prolonged expiration or respiratory distress  Breath sounds: Wheezing present  Abdominal:      General: Abdomen is flat  Bowel sounds are decreased  Palpations: Abdomen is soft  Tenderness: There is no abdominal tenderness  Genitourinary:     Comments: Voiding independently   Musculoskeletal:      Right lower leg: No edema  Left lower leg: No edema  Skin:     General: Skin is warm  Capillary Refill: Capillary refill takes less than 2 seconds  Coloration: Skin is pale  Neurological:      General: No focal deficit present  Mental Status: She is alert and oriented to person, place, and time  GCS: GCS eye subscore is 4  GCS verbal subscore is 5  GCS motor subscore is 6  Cranial Nerves: Cranial nerves 2-12 are intact  Psychiatric:         Mood and Affect: Mood and affect normal          Behavior: Behavior is cooperative          --------------------------------------------------------------------------------------------------------------  Vitals:   Vitals:    03/18/23 2053 03/18/23 2100 03/18/23 2200 03/18/23 2253   BP:  128/96  137/65   BP Location:    Right arm   Pulse:  85  82   Resp:  (!) 28  (!) 25   Temp:    98 5 °F (36 9 °C)   TempSrc:    Oral   SpO2: 96% 100%  98%   Weight:   48 7 kg (107 lb 5 8 oz)    Height:   4' 10" (1 473 m)      Temp  Min: 98 2 °F (36 8 °C)  Max: 98 5 °F (36 9 °C)  IBW (Ideal Body Weight): 40 9 kg  Height: 4' 10" (147 3 cm)  Body mass index is 22 44 kg/m²      Laboratory and Diagnostics:  Results from last 7 days   Lab Units 03/18/23 1955 03/18/23 1946   WBC Thousand/uL  --  13 21*   HEMOGLOBIN g/dL  --  9 7*   I STAT HEMOGLOBIN g/dl 10 5*  --    HEMATOCRIT %  --  30 7*   HEMATOCRIT, ISTAT % 31*  --    PLATELETS Thousands/uL  --  390   NEUTROS PCT %  --  85*   MONOS PCT %  --  6     Results from last 7 days   Lab Units 03/18/23 1955 23   SODIUM mmol/L  --  137   POTASSIUM mmol/L  --  4 9   CHLORIDE mmol/L  --  103   CO2 mmol/L  --  26   CO2, I-STAT mmol/L 27  --    ANION GAP mmol/L  --  8   BUN mg/dL  --  25   CREATININE mg/dL  --  0 91   CALCIUM mg/dL  --  8 9   GLUCOSE RANDOM mg/dL  --  91   ALT U/L  --  13   AST U/L  --  13   ALK PHOS U/L  --  84   ALBUMIN g/dL  --  3 6   TOTAL BILIRUBIN mg/dL  --  0 45          Results from last 7 days   Lab Units 23   INR  1 65*          Results from last 7 days   Lab Units 23   LACTIC ACID mmol/L 1 0     ABG:    VBG:    Results from last 7 days   Lab Units 23   PROCALCITONIN ng/ml 0 08       Micro:        EKG: NSR on tele   Imaging: I have personally reviewed pertinent reports  XR chest 1 view portable    (Results Pending)   XR abdomen 1 view kub    (Results Pending)         Historical Information   Past Medical History:   Diagnosis Date   • COPD (chronic obstructive pulmonary disease) ([facility] )    • Hiatal hernia    • Hypertension    • Melanoma in situ of the skin [facility])     Multiple sites removed   • Pneumonia    • Pulmonary embolism (HCC)     on Xarelto   • Skin cancer      Past Surgical History:   Procedure Laterality Date   • APPENDECTOMY     • HYSTERECTOMY     • PA ESOPHAGOGASTRODUODENOSCOPY TRANSORAL DIAGNOSTIC N/A 2019    Procedure: ESOPHAGOGASTRODUODENOSCOPY (EGD);   Surgeon: Rhonda Sands MD;  Location: [facility];  Service: Gastroenterology   • SKIN CANCER EXCISION     • SKIN CANCER EXCISION     • UPPER GASTROINTESTINAL ENDOSCOPY       Social History   Social History     Substance and Sexual Activity   Alcohol Use Never     Social History     Substance and Sexual Activity   Drug Use Never     Social History     Tobacco Use   Smoking Status Former   • Types: Cigarettes   • Quit date:    • Years since quittin 4   Smokeless Tobacco Never       Family History:   Family History   Problem Relation Age of Onset   • Heart disease Mother • Diabetes Father    • Diabetes Sister    • Cancer Brother    • Colon polyps Brother    • Diabetes Brother      I have reviewed this patient's family history and commented on sigificant items within the HPI      Medications:  Current Facility-Administered Medications   Medication Dose Route Frequency   • [START ON 3/19/2023] amLODIPine (NORVASC) tablet 10 mg  10 mg Oral Daily   • [START ON 3/19/2023] budesonide (PULMICORT) inhalation solution 0 5 mg  0 5 mg Nebulization BID   • [START ON 3/19/2023] fluticasone-vilanterol 200-25 mcg/actuation 1 puff  1 puff Inhalation Daily   • guaiFENesin (MUCINEX) 12 hr tablet 600 mg  600 mg Oral Q12H [address]   • [START ON 3/19/2023] hydroxyurea (HYDREA) capsule 500 mg  500 mg Oral Daily   • [START ON 3/19/2023] ipratropium (ATROVENT) 0 02 % inhalation solution 0 5 mg  0 5 mg Nebulization Q6H   • [START ON 3/19/2023] levalbuterol (XOPENEX) inhalation solution 1 25 mg  1 25 mg Nebulization Q6H   • methylPREDNISolone sodium succinate (Solu-MEDROL) injection 40 mg  40 mg Intravenous Q8H [address]   • montelukast (SINGULAIR) tablet 10 mg  10 mg Oral HS   • [START ON 3/19/2023] pantoprazole (PROTONIX) EC tablet 40 mg  40 mg Oral Daily Before Breakfast   • [START ON 3/19/2023] rivaroxaban (XARELTO) tablet 20 mg  20 mg Oral Daily With Dinner     Home medications:  Prior to Admission Medications   Prescriptions Last Dose Informant Patient Reported? Taking?    XARELTO 20 MG tablet   Yes No   Sig: daily    albuterol (2 5 mg/3 mL) 0 083 % nebulizer solution   Yes No   Sig: Take 2 5 mg by nebulization 4 (four) times a day   albuterol (PROVENTIL HFA,VENTOLIN HFA) 90 mcg/act inhaler   Yes No   Sig: Inhale daily as needed    amLODIPine (NORVASC) 10 mg tablet   Yes No   Sig: daily   benzonatate (TESSALON PERLES) 100 mg capsule   No No   Sig: Take 1 capsule (100 mg total) by mouth 3 (three) times a day as needed for cough   budesonide (PULMICORT) 0 5 mg/2 mL nebulizer solution   Yes No   Sig: Take 0 5 mg by nebulization 2 (two) times a day Rinse mouth after use  ferrous sulfate 325 (65 Fe) mg tablet   No No   Sig: Take 1 tablet (325 mg total) by mouth daily with breakfast Do not start before March 1, 2023  guaiFENesin (MUCINEX) 600 mg 12 hr tablet   Yes No   Sig: Take 600 mg by mouth every 12 (twelve) hours   hydroxyurea (HYDREA) 500 mg capsule   Yes No   Sig: Take by mouth daily   ipratropium-albuterol (DUO-NEB) 0 5-2 5 mg/3 mL nebulizer solution   No No   Sig: Take 3 mL by nebulization every 6 (six) hours as needed for wheezing   mometasone-formoterol (DULERA) 200-5 MCG/ACT inhaler   Yes No   Sig: Inhale 2 puffs 2 (two) times a day Rinse mouth after use    montelukast (SINGULAIR) 10 mg tablet   Yes No   Sig: Take by mouth daily at bedtime    pantoprazole (PROTONIX) 40 mg tablet   No No   Sig: Take 1 tablet (40 mg total) by mouth daily before breakfast Do not start before March 1, 2023  potassium chloride (MICRO-K) 10 MEQ CR capsule   Yes No   Sig: Take 10 mEq by mouth 2 (two) times a day   predniSONE 20 mg tablet   No No   Sig: Take 2 tablets (40 mg total) by mouth daily for 2 days, THEN 1 5 tablets (30 mg total) daily for 3 days, THEN 1 tablet (20 mg total) daily for 3 days  Do not start before March 10, 2023  Facility-Administered Medications: None     Allergies:  No Known Allergies    ------------------------------------------------------------------------------------------------------------  Advance Directive and Living Will:      Power of :    POLST:    ------------------------------------------------------------------------------------------------------------  Anticipated Length of Stay is > 2 midnights    Care Time Delivered:   No Critical Care time spent       SLIM Torres        Portions of the record may have been created with voice recognition software    Occasional wrong word or "sound a like" substitutions may have occurred due to the inherent limitations of voice recognition software    Read the chart carefully and recognize, using context, where substitutions have occurred

## 2023-03-19 NOTE — ASSESSMENT & PLAN NOTE
· Follows with Dr Lizzy Zepeda at [facility]   · No PFTs on file   · Home regimen: pulmicort bid, mucinex bid, dulera bid, singular qd, PRN albuterol   · SOB since her discharge from [address] [place] [address] 3/9 for PNA with reports of constipation for several weeks   · VBG on admission -- 7 39/42 7/34/26  · Unclear if this is COPD AE   · Brief bipap in the ER for tachypnea, WOB, though was able to be weaned to 3L NC with spo2 high 90s  · bipap PRN for increased WOB   · Start solumedrol 40mg q8h   · Schedule xopenex, atrovent   · Cont home nebs   · Wean fio2 for goal spo2 > 88%

## 2023-03-19 NOTE — PLAN OF CARE
Problem: MOBILITY - ADULT  Goal: Maintain or return to baseline ADL function  Description: INTERVENTIONS:  -  Assess patient's ability to carry out ADLs; assess patient's baseline for ADL function and identify physical deficits which impact ability to perform ADLs (bathing, care of mouth/teeth, toileting, grooming, dressing, etc )  - Assess/evaluate cause of self-care deficits   - Assess range of motion  - Assess patient's mobility; develop plan if impaired  - Assess patient's need for assistive devices and provide as appropriate  - Encourage maximum independence but intervene and supervise when necessary  - Involve family in performance of ADLs  - Assess for home care needs following discharge   - Consider OT consult to assist with ADL evaluation and planning for discharge  - Provide patient education as appropriate  Outcome: Progressing  Goal: Maintains/Returns to pre admission functional level  Description: INTERVENTIONS:  - Perform BMAT or MOVE assessment daily    - Set and communicate daily mobility goal to care team and patient/family/caregiver     - Collaborate with rehabilitation services on mobility goals if consulted  - Out of bed for toileting  - Record patient progress and toleration of activity level   Outcome: Progressing     Problem: INFECTION - ADULT  Goal: Absence or prevention of progression during hospitalization  Description: INTERVENTIONS:  - Assess and monitor for signs and symptoms of infection  - Monitor lab/diagnostic results  - Monitor all insertion sites, i e  indwelling lines, tubes, and drains  - Monitor endotracheal if appropriate and nasal secretions for changes in amount and color  - [place] appropriate cooling/warming therapies per order  - Administer medications as ordered  - Instruct and encourage patient and family to use good hand hygiene technique  - Identify and instruct in appropriate isolation precautions for identified infection/condition  Outcome: Progressing Problem: SAFETY ADULT  Goal: Maintain or return to baseline ADL function  Description: INTERVENTIONS:  -  Assess patient's ability to carry out ADLs; assess patient's baseline for ADL function and identify physical deficits which impact ability to perform ADLs (bathing, care of mouth/teeth, toileting, grooming, dressing, etc )  - Assess/evaluate cause of self-care deficits   - Assess range of motion  - Assess patient's mobility; develop plan if impaired  - Assess patient's need for assistive devices and provide as appropriate  - Encourage maximum independence but intervene and supervise when necessary  - Involve family in performance of ADLs  - Assess for home care needs following discharge   - Consider OT consult to assist with ADL evaluation and planning for discharge  - Provide patient education as appropriate  Outcome: Progressing  Goal: Maintains/Returns to pre admission functional level  Description: INTERVENTIONS:  - Perform BMAT or MOVE assessment daily    - Set and communicate daily mobility goal to care team and patient/family/caregiver     - Collaborate with rehabilitation services on mobility goals if consulted  - Out of bed for toileting  - Record patient progress and toleration of activity level   Outcome: Progressing  Goal: Patient will remain free of falls  Description: INTERVENTIONS:  - Educate patient/family on patient safety including physical limitations  - Instruct patient to call for assistance with activity   - Consult OT/PT to assist with strengthening/mobility   - Keep Call bell within reach  - Keep bed low and locked with side rails adjusted as appropriate  - Keep care items and personal belongings within reach  - Initiate and maintain comfort rounds  - Make Fall Risk Sign visible to staff  - Apply yellow socks and bracelet for high fall risk patients  - Consider moving patient to room near nurses station  Outcome: Progressing     Problem: DISCHARGE PLANNING  Goal: Discharge to home or other facility with appropriate resources  Description: INTERVENTIONS:  - Identify barriers to discharge w/patient and caregiver  - Arrange for needed discharge resources and transportation as appropriate  - Identify discharge learning needs (meds, wound care, etc )  - Arrange for interpretive services to assist at discharge as needed  - Refer to Case Management Department for coordinating discharge planning if the patient needs post-hospital services based on physician/advanced practitioner order or complex needs related to functional status, cognitive ability, or social support system  Outcome: Progressing     Problem: Knowledge Deficit  Goal: Patient/family/caregiver demonstrates understanding of disease process, treatment plan, medications, and discharge instructions  Description: Complete learning assessment and assess knowledge base  Interventions:  - Provide teaching at level of understanding  - Provide teaching via preferred learning methods  Outcome: Progressing     Problem: Nutrition/Hydration-ADULT  Goal: Nutrient/Hydration intake appropriate for improving, restoring or maintaining nutritional needs  Description: Monitor and assess patient's nutrition/hydration status for malnutrition  Collaborate with interdisciplinary team and initiate plan and interventions as ordered  Monitor patient's weight and dietary intake as ordered or per policy  Utilize nutrition screening tool and intervene as necessary  Determine patient's food preferences and provide high-protein, high-caloric foods as appropriate       INTERVENTIONS:  - Monitor oral intake, urinary output, labs, and treatment plans  - Assess nutrition and hydration status and recommend course of action  - Evaluate amount of meals eaten  - Assist patient with eating if necessary   - Allow adequate time for meals  - Recommend/ encourage appropriate diets, oral nutritional supplements, and vitamin/mineral supplements  - Order, calculate, and assess calorie counts as needed  - Recommend, monitor, and adjust tube feedings and TPN/PPN based on assessed needs  - Assess need for intravenous fluids  - Provide specific nutrition/hydration education as appropriate  - Include patient/family/caregiver in decisions related to nutrition  Outcome: Progressing

## 2023-03-19 NOTE — ASSESSMENT & PLAN NOTE
· POA AEB tachypnea, leukocytosis   · Unclear etiology, COPD AE, infectious process, or other   · Procal neg -- recheck in AM   · Check COVID, flu swab   · Send BC, strep, legionella, sputum cx   · Hold on abx for now as no clear infiltrate or effusion on CXR and recently completed abx therapies   · Afebrile, HD stable   · Trend endpoints

## 2023-03-19 NOTE — CONSULTS
Consultation - General Surgery   Eusebio Gordon 80 y o  female MRN: [id number]  Unit/Bed#: -01 SDU Encounter: [id number]               Assessment/Plan     Large Hiatal Hernia  -Pt with hx of GERD  States it is well controlled on meds  -She reports she has seen GI in the past as well  In the past the plan was more conservative mngt   -Last UGI was in 2019  -She denies any coughing up food, or worsening gerd symptoms  -GI is consulted  If patient is with worsening symptoms, she can get another UGI  -Pt herself declines any surgical intervention at this time  She can continue with conservative mngt, cont with small meals and gerd treatment  She is also 89yo with multiple medical co-morbidities so she remains a high risk surgical candidate  We will sign off  Please call as needed    Constipation, SIRS, CML, PE, HTN, COPD  -mngt per primary        History of Present Illness    Admit Date:  3/18/2023  Hospital Day:  1 day  Primary Service:  Hospitalist  Attending Provider:  Kin Miller MD  Physician Requesting Consult: Kin Miller MD    HPI: Eusebio Gordon is a 80 y o  female with a past medical history of GERD, anemia, COPD, hypertension, PE on Xarelto, CML who presented to the ER for SOB  Patient was recently discharged from this facility after being treated for pneumonia  Patient was noted to be tachypneic and had increased work of breathing and was admitted  She is on BiPAP  CXR showed large hiatal hernia and surgery was consulted  Pt states she has GERD symptoms but reports it is well controlled on medications  She denies coughing up food  She reports she tends to eat small meals  Pt reports she was seen GI in the past  She was told she is not a surgical candidate for hiatal hernia  Upon asking her, pt reports she does not want any surgical intervention  Consults    Review of Systems     CONSTITUTIONAL: Denies any fever, chills, rigors, and weight loss    HEENT: No facial trauma, earache or tinnitus  Denies hearing loss or visual disturbances  CARDIOVASCULAR: No chest pain or palpitations  RESPIRATORY: sob, see hpi  GASTROINTESTINAL: + constipation   No diarrhea, no abdominal hernias, no nausea or vomiting  GENITOURINARY: No problems with urination  Denies any hematuria or dysuria  NEUROLOGIC:  negative for dizziness, weakness, vertigo, denies headaches  MUSCULOSKELETAL: Denies any muscle or joint pain  SKIN: Denies skin rashes or itching  ENDOCRINE: Denies excessive thirst  Denies intolerance to heat or cold  PSYCHOSOCIAL:  Negative for depression and anxiety Denies any recent memory loss  Historical Information   Past Medical History:   Diagnosis Date   • COPD (chronic obstructive pulmonary disease) ([facility] )    • Hiatal hernia    • Hypertension    • Melanoma in situ of the skin [facility])     Multiple sites removed   • Pneumonia    • Pulmonary embolism ([facility] )     on Xarelto   • Skin cancer      Past Surgical History:   Procedure Laterality Date   • APPENDECTOMY     • HYSTERECTOMY     • AR ESOPHAGOGASTRODUODENOSCOPY TRANSORAL DIAGNOSTIC N/A 2019    Procedure: ESOPHAGOGASTRODUODENOSCOPY (EGD);   Surgeon: Zafar Flores MD;  Location: [facility];  Service: Gastroenterology   • SKIN CANCER EXCISION     • SKIN CANCER EXCISION     • UPPER GASTROINTESTINAL ENDOSCOPY       Social History   Social History     Substance and Sexual Activity   Alcohol Use Never     Social History     Substance and Sexual Activity   Drug Use Never     E-Cigarette/Vaping   • E-Cigarette Use Never User      E-Cigarette/Vaping Substances   • Nicotine No    • THC No    • CBD No    • Flavoring No    • Other No    • Unknown No      Social History     Tobacco Use   Smoking Status Former   • Types: Cigarettes   • Quit date:    • Years since quittin 2   Smokeless Tobacco Never     Family History:   Family History   Problem Relation Age of Onset   • Heart disease Mother    • Diabetes Father    • Diabetes Sister • Cancer Brother    • Colon polyps Brother    • Diabetes Brother        Meds/Allergies   current meds:   Current Facility-Administered Medications   Medication Dose Route Frequency   • amLODIPine (NORVASC) tablet 10 mg  10 mg Oral Daily   • budesonide (PULMICORT) inhalation solution 0 5 mg  0 5 mg Nebulization BID   • fluticasone-vilanterol 200-25 mcg/actuation 1 puff  1 puff Inhalation Daily   • guaiFENesin (MUCINEX) 12 hr tablet 600 mg  600 mg Oral Q12H CONNIE   • hydroxyurea (HYDREA) capsule 500 mg  500 mg Oral Daily   • ipratropium (ATROVENT) 0 02 % inhalation solution 0 5 mg  0 5 mg Nebulization TID   • levalbuterol (XOPENEX) inhalation solution 1 25 mg  1 25 mg Nebulization TID   • methylPREDNISolone sodium succinate (Solu-MEDROL) injection 40 mg  40 mg Intravenous Q8H [address]   • montelukast (SINGULAIR) tablet 10 mg  10 mg Oral HS   • pantoprazole (PROTONIX) EC tablet 40 mg  40 mg Oral Daily Before Breakfast   • rivaroxaban (XARELTO) tablet 20 mg  20 mg Oral Daily With Dinner         No Known Allergies    Objective   Temp:  [98 2 °F (36 8 °C)-98 5 °F (36 9 °C)] 98 2 °F (36 8 °C)  HR:  [71-88] 71  Resp:  [18-28] 18  BP: (128-165)/(64-96) 139/64    Intake/Output Summary (Last 24 hours) at 3/19/2023 1004  Last data filed at 3/19/2023 0015  Gross per 24 hour   Intake --   Output 100 ml   Net -100 ml       Physical Exam   General Appearance: NAD, cooperative, alert  Eyes: Anicteric, conjunctiva clear  HENT:  Normocephalic, atraumatic, normal mucosa  external ears normal, external nose normal, no drainage  Neck:    Supple, symmetrical, trachea midline  Resp:  + wheezing; respirations unlabored   barrel chested   CV:  S1 S2, Regular rate and rhythm; no murmur, rub, or gallop  GI:  Soft, non-tender, non-distended; normal bowel sounds; no masses, no organomegaly   Musculoskeletal: No cyanosis, clubbing or edema  Normal ROM    Skin:   No jaundice, rashes, or lesions   Psych: Normal affect, good eye contact  Neuro: No gross deficits, AAOx3, speech nl, adams        Lab Results:   CBC:   Lab Results   Component Value Date    WBC 8 65 03/19/2023    HGB 8 3 (L) 03/19/2023    HCT 26 1 (L) 03/19/2023     (H) 03/19/2023     03/19/2023    MCH 36 1 (H) 03/19/2023    MCHC 31 8 03/19/2023    RDW 16 3 (H) 03/19/2023    MPV 9 3 03/19/2023    NRBC 0 03/19/2023   , CMP:   Lab Results   Component Value Date    SODIUM 136 03/19/2023    K 4 8 03/19/2023     03/19/2023    CO2 24 03/19/2023    CO2 27 03/18/2023    BUN 25 03/19/2023    CREATININE 0 71 03/19/2023    GLUCOSE 99 03/18/2023    CALCIUM 8 3 (L) 03/19/2023    AST 13 03/18/2023    ALT 13 03/18/2023    ALKPHOS 84 03/18/2023    EGFR 72 03/19/2023   , BMP:  Lab Results   Component Value Date    SODIUM 136 03/19/2023    K 4 8 03/19/2023     03/19/2023    CO2 24 03/19/2023    CO2 27 03/18/2023    BUN 25 03/19/2023    CREATININE 0 71 03/19/2023    GLUC 177 (H) 03/19/2023    CALCIUM 8 3 (L) 03/19/2023    AGAP 6 03/19/2023    EGFR 72 03/19/2023     Imaging Studies: I have personally reviewed pertinent reports  EKG, Pathology, and Other Studies: I have personally reviewed pertinent reports  Counseling / Coordination of Care  Total floor / unit time spent today 15 minutes  Greater than 50% of total time was spent with the patient and / or family counseling and / or coordination of care   A description of the counseling / coordination of care: 30mins

## 2023-03-19 NOTE — ASSESSMENT & PLAN NOTE
· Present at the time of admission with leukocytosis and tachypnea  · Leukocytosis is improving  · With known history of CML on hydroxyurea  · Procal negative  · Infectious work-up has been negative  · Monitor off of antibiotics  · Remained afebrile and hemodynamically

## 2023-03-19 NOTE — PROGRESS NOTES
[place]  Progress Note - Che Pinedo 3/22/1926, 80 y o  female MRN: [id number]  Unit/Bed#: -01 SDU Encounter: [id number]  Primary Care Provider: SLIM Ortez   Date and time admitted to hospital: 3/18/2023  7:36 PM    * COPD (chronic obstructive pulmonary disease) ([facility] )  Assessment & Plan  · Patient with COPD exacerbation, patient required BiPAP initially-only on nasal cannula  · Patient was given dexamethasone x1 in the emergency room  Currently getting Solu-Medrol 40 mg 3 times daily-start tapering from tomorrow  · Respiratory protocol  · Airway clearance protocol  · Continue with Mucinex  · Pulmonology evaluation  · Continue with supplemental oxygen    Constipation  Assessment & Plan  · Patient noted to have constipation on imaging studies  Continue with the bowel regimen  SIRS (systemic inflammatory response syndrome) (HCC)  Assessment & Plan  · Present at the time of admission with leukocytosis and tachypnea  · Leukocytosis is improving  · With known history of CML on hydroxyurea  · Procal negative  · Infectious work-up has been negative  · Monitor off of antibiotics  · Remained afebrile and hemodynamically    CML (chronic myelocytic leukemia) (HCC)  Assessment & Plan  · Patient with known history of CML and maintained on hydroxyurea  · Follows up with Dr Shira Carney in [facility]  ·     Essential thrombocythemia [facility])  Assessment & Plan  · Monitor platelet    Pulmonary emboli [facility])  Assessment & Plan  · History of PE noted in 2018  Patient is on Xarelto as outpatient  · Continue with Xarelto    Hypertension  Assessment & Plan  · Continue with Norvasc    Anemia  Assessment & Plan  · Patient with chronic anemia    Currently hemoglobin at baseline  · Yesterday hemoglobin was  9 7 which is an outlier but in the records  · Monitor  · No signs of active bleed    Large hiatal hernia  Assessment & Plan  · Patient with known history of large hiatal hernia  · GI and surgery has been consulted-ensure she is a candidate for any kind of surgical repair    Gastroesophageal reflux disease  Assessment & Plan  · Continue PPI      VTE Pharmacologic Prophylaxis:   Moderate Risk (Score 3-4) - Pharmacological DVT Prophylaxis Ordered: rivaroxaban (Xarelto)  Patient Centered Rounds: I performed bedside rounds with nursing staff today  Discussions with Specialists or Other Care Team Provider:     Education and Discussions with Family / Patient: Updated  (son) at bedside  Total Time Spent on Date of Encounter in care of patient: 35 minutes This time was spent on one or more of the following: performing physical exam; counseling and coordination of care; obtaining or reviewing history; documenting in the medical record; reviewing/ordering tests, medications or procedures; communicating with other healthcare professionals and discussing with patient's family/caregivers  Current Length of Stay: 1 day(s)  Current Patient Status: Inpatient   Certification Statement: The patient will continue to require additional inpatient hospital stay due to cpod exacerbation  Discharge Plan:     Code Status: Level 3 - DNAR and DNI    Subjective:   Patient seen and examined  Sitting up in the chair  Reported that her breathing is better  GI and surgery evaluation appreciated  Plan is to continue with the supportive care  Not a surgical candidate  Son and room reported that her breathing is improved    Objective:     Vitals:   Temp (24hrs), Av 8 °F (36 6 °C), Min:97 1 °F (36 2 °C), Max:98 5 °F (36 9 °C)    Temp:  [97 1 °F (36 2 °C)-98 5 °F (36 9 °C)] 97 6 °F (36 4 °C)  HR:  [71-92] 79  Resp:  [18-42] 24  BP: (128-165)/(60-96) 140/65  SpO2:  [94 %-100 %] 97 %  Body mass index is 22 44 kg/m²  Input and Output Summary (last 24 hours):      Intake/Output Summary (Last 24 hours) at 3/19/2023 1618  Last data filed at 3/19/2023 1300  Gross per 24 hour   Intake 160 ml   Output 500 ml Net -340 ml       Physical Exam:   Physical Exam  Constitutional:       General: She is not in acute distress  Appearance: She is not ill-appearing  HENT:      Head: Normocephalic  Nose: Nose normal       Mouth/Throat:      Mouth: Mucous membranes are moist    Eyes:      General: No scleral icterus  Cardiovascular:      Rate and Rhythm: Normal rate and regular rhythm  Pulses: Normal pulses  Heart sounds: Normal heart sounds  Pulmonary:      Breath sounds: Rhonchi present  No rales  Comments:  Decreased  breath sounds bilateral  Abdominal:      General: There is no distension  Tenderness: There is no abdominal tenderness  Musculoskeletal:         General: Normal range of motion  Skin:     General: Skin is warm  Neurological:      Mental Status: She is alert  Cranial Nerves: No cranial nerve deficit  Additional Data:     Labs:  Results from last 7 days   Lab Units 03/19/23  0509   WBC Thousand/uL 8 65   HEMOGLOBIN g/dL 8 3*   HEMATOCRIT % 26 1*   PLATELETS Thousands/uL 290   NEUTROS PCT % 95*   LYMPHS PCT % 3*   MONOS PCT % 1*   EOS PCT % 0     Results from last 7 days   Lab Units 03/19/23  0509 03/18/23 1955 03/18/23 1946   SODIUM mmol/L 136  --  137   POTASSIUM mmol/L 4 8  --  4 9   CHLORIDE mmol/L 106  --  103   CO2 mmol/L 24  --  26   CO2, I-STAT   --    < >  --    BUN mg/dL 25  --  25   CREATININE mg/dL 0 71  --  0 91   ANION GAP mmol/L 6  --  8   CALCIUM mg/dL 8 3*  --  8 9   ALBUMIN g/dL  --   --  3 6   TOTAL BILIRUBIN mg/dL  --   --  0 45   ALK PHOS U/L  --   --  84   ALT U/L  --   --  13   AST U/L  --   --  13   GLUCOSE RANDOM mg/dL 177*  --  91    < > = values in this interval not displayed       Results from last 7 days   Lab Units 03/18/23  1946   INR  1 65*             Results from last 7 days   Lab Units 03/19/23  0509 03/18/23 2203 03/18/23 1946   LACTIC ACID mmol/L  --  1 0  --    PROCALCITONIN ng/ml 0 07  --  0 08       Lines/Drains:  Invasive Devices     Peripheral Intravenous Line  Duration           Peripheral IV 03/18/23 Right Antecubital <1 day                      Imaging: Reviewed radiology reports from this admission including: chest xray    Recent Cultures (last 7 days):   Results from last 7 days   Lab Units 03/18/23  2335 03/18/23  2100   BLOOD CULTURE   --  Received in Microbiology Lab  Culture in Progress  Received in Microbiology Lab  Culture in Progress  LEGIONELLA URINARY ANTIGEN  Negative  --        Last 24 Hours Medication List:   Current Facility-Administered Medications   Medication Dose Route Frequency Provider Last Rate   • amLODIPine  10 mg Oral Daily Elwyn Seneca, CRNP     • budesonide  0 5 mg Nebulization BID Elwyn Seneca, CRNP     • fluticasone-vilanterol  1 puff Inhalation Daily Elwyn Seneca, CRNP     • guaiFENesin  600 mg Oral Q12H [address] Elwyn Seneca, CRNP     • hydroxyurea  500 mg Oral Daily Elwyn Seneca, CRNP     • ipratropium  0 5 mg Nebulization TID Edwin Sheffield MD     • levalbuterol  1 25 mg Nebulization TID dEwin Sheffield MD     • levalbuterol  1 25 mg Nebulization Q8H PRN Emmy Perdue MD     • methylPREDNISolone sodium succinate  40 mg Intravenous [facility] Emmy Perdue MD     • montelukast  10 mg Oral HS Elwyn Seneca, CRNP     • pantoprazole  40 mg Oral Daily Before Breakfast Elwyn Seneca, CRNP     • polyethylene glycol  17 g Oral BID Emeterio Pelaez MD     • rivaroxaban  20 mg Oral Daily With Dinner Elwyn Seneca, CRNP          Today, Patient Was Seen By: Emmy Perdue MD    **Please Note: This note may have been constructed using a voice recognition system  **

## 2023-03-20 LAB
ANION GAP SERPL CALCULATED.3IONS-SCNC: 6 MMOL/L (ref 4–13)
ATRIAL RATE: 86 BPM
BUN SERPL-MCNC: 28 MG/DL (ref 5–25)
CALCIUM SERPL-MCNC: 8.6 MG/DL (ref 8.4–10.2)
CHLORIDE SERPL-SCNC: 105 MMOL/L (ref 96–108)
CO2 SERPL-SCNC: 27 MMOL/L (ref 21–32)
CREAT SERPL-MCNC: 0.68 MG/DL (ref 0.6–1.3)
ERYTHROCYTE [DISTWIDTH] IN BLOOD BY AUTOMATED COUNT: 16.5 % (ref 11.6–15.1)
GFR SERPL CREATININE-BSD FRML MDRD: 74 ML/MIN/1.73SQ M
GLUCOSE SERPL-MCNC: 151 MG/DL (ref 65–140)
HCT VFR BLD AUTO: 25.3 % (ref 34.8–46.1)
HGB BLD-MCNC: 8 G/DL (ref 11.5–15.4)
MCH RBC QN AUTO: 35.6 PG (ref 26.8–34.3)
MCHC RBC AUTO-ENTMCNC: 31.6 G/DL (ref 31.4–37.4)
MCV RBC AUTO: 112 FL (ref 82–98)
P AXIS: 89 DEGREES
PLATELET # BLD AUTO: 314 THOUSANDS/UL (ref 149–390)
PMV BLD AUTO: 9.3 FL (ref 8.9–12.7)
POTASSIUM SERPL-SCNC: 4.6 MMOL/L (ref 3.5–5.3)
PR INTERVAL: 168 MS
QRS AXIS: 70 DEGREES
QRSD INTERVAL: 72 MS
QT INTERVAL: 376 MS
QTC INTERVAL: 449 MS
RBC # BLD AUTO: 2.25 MILLION/UL (ref 3.81–5.12)
SODIUM SERPL-SCNC: 138 MMOL/L (ref 135–147)
T WAVE AXIS: 69 DEGREES
VENTRICULAR RATE: 86 BPM
WBC # BLD AUTO: 12.58 THOUSAND/UL (ref 4.31–10.16)

## 2023-03-20 RX ORDER — SENNOSIDES 8.6 MG
1 TABLET ORAL
Status: DISCONTINUED | OUTPATIENT
Start: 2023-03-20 | End: 2023-03-21 | Stop reason: HOSPADM

## 2023-03-20 RX ORDER — METHYLPREDNISOLONE SODIUM SUCCINATE 40 MG/ML
40 INJECTION, POWDER, LYOPHILIZED, FOR SOLUTION INTRAMUSCULAR; INTRAVENOUS EVERY 12 HOURS SCHEDULED
Status: DISCONTINUED | OUTPATIENT
Start: 2023-03-20 | End: 2023-03-21 | Stop reason: HOSPADM

## 2023-03-20 RX ORDER — BISACODYL 10 MG
10 SUPPOSITORY, RECTAL RECTAL DAILY PRN
Status: DISCONTINUED | OUTPATIENT
Start: 2023-03-20 | End: 2023-03-21 | Stop reason: HOSPADM

## 2023-03-20 RX ADMIN — IPRATROPIUM BROMIDE 0.5 MG: 0.5 SOLUTION RESPIRATORY (INHALATION) at 14:32

## 2023-03-20 RX ADMIN — FLUTICASONE FUROATE AND VILANTEROL TRIFENATATE 1 PUFF: 200; 25 POWDER RESPIRATORY (INHALATION) at 11:45

## 2023-03-20 RX ADMIN — IPRATROPIUM BROMIDE 0.5 MG: 0.5 SOLUTION RESPIRATORY (INHALATION) at 20:06

## 2023-03-20 RX ADMIN — METHYLPREDNISOLONE SODIUM SUCCINATE 40 MG: 40 INJECTION, POWDER, FOR SOLUTION INTRAMUSCULAR; INTRAVENOUS at 21:14

## 2023-03-20 RX ADMIN — BUDESONIDE 0.5 MG: 0.5 INHALANT ORAL at 07:09

## 2023-03-20 RX ADMIN — METHYLPREDNISOLONE SODIUM SUCCINATE 40 MG: 40 INJECTION, POWDER, FOR SOLUTION INTRAMUSCULAR; INTRAVENOUS at 05:38

## 2023-03-20 RX ADMIN — IPRATROPIUM BROMIDE 0.5 MG: 0.5 SOLUTION RESPIRATORY (INHALATION) at 07:10

## 2023-03-20 RX ADMIN — MONTELUKAST 10 MG: 10 TABLET, FILM COATED ORAL at 21:14

## 2023-03-20 RX ADMIN — HYDROXYUREA 500 MG: 500 CAPSULE ORAL at 08:18

## 2023-03-20 RX ADMIN — AMLODIPINE BESYLATE 10 MG: 5 TABLET ORAL at 08:18

## 2023-03-20 RX ADMIN — PANTOPRAZOLE SODIUM 40 MG: 40 TABLET, DELAYED RELEASE ORAL at 05:38

## 2023-03-20 RX ADMIN — POLYETHYLENE GLYCOL 3350 17 G: 17 POWDER, FOR SOLUTION ORAL at 08:18

## 2023-03-20 RX ADMIN — LEVALBUTEROL 1.25 MG: 1.25 SOLUTION, CONCENTRATE RESPIRATORY (INHALATION) at 07:09

## 2023-03-20 RX ADMIN — BUDESONIDE 0.5 MG: 0.5 INHALANT ORAL at 20:06

## 2023-03-20 RX ADMIN — LEVALBUTEROL 1.25 MG: 1.25 SOLUTION, CONCENTRATE RESPIRATORY (INHALATION) at 20:06

## 2023-03-20 RX ADMIN — POLYETHYLENE GLYCOL 3350 17 G: 17 POWDER, FOR SOLUTION ORAL at 17:24

## 2023-03-20 RX ADMIN — LEVALBUTEROL 1.25 MG: 1.25 SOLUTION, CONCENTRATE RESPIRATORY (INHALATION) at 14:32

## 2023-03-20 RX ADMIN — GUAIFENESIN 600 MG: 600 TABLET ORAL at 21:14

## 2023-03-20 RX ADMIN — RIVAROXABAN 20 MG: 20 TABLET, FILM COATED ORAL at 17:24

## 2023-03-20 RX ADMIN — SENNOSIDES 8.6 MG: 8.6 TABLET, FILM COATED ORAL at 21:14

## 2023-03-20 RX ADMIN — GUAIFENESIN 600 MG: 600 TABLET ORAL at 08:18

## 2023-03-20 NOTE — ASSESSMENT & PLAN NOTE
· Patient with known history of large hiatal hernia  · GI and surgery has been consulted-ensure she is a candidate for any kind of surgical repair  · Remains nonoperative

## 2023-03-20 NOTE — DISCHARGE INSTR - DIET
Dysphagia Level 3/Soft Foods  Thin Liquids  Medications as Tolerated  Oral Care x2-3 times per day  Upright with all PO intake

## 2023-03-20 NOTE — OCCUPATIONAL THERAPY NOTE
Occupational Therapy Screen Note     Patient Name: Buster MARTINEZ Date: 3/20/2023  Problem List  Principal Problem:    COPD (chronic obstructive pulmonary disease) (HCC)  Active Problems:    Gastroesophageal reflux disease    Large hiatal hernia    Anemia    Hypertension    Pulmonary emboli (HCC)    Essential thrombocythemia (HCC)    CML (chronic myelocytic leukemia) ([facility] )    [facility]ic [facility] response syndrome[facility]    Constipation       03/20/23 1458   OT Last Visit   OT Visit Date 03/20/23   Note Type   Note type Screen   Additional Comments OT orders received  Chart reviewed  Per medical rounds, pt has no acute therapy needs  Confirmed with RN  Will DC OT orders         Nanda Guzman OTR/L

## 2023-03-20 NOTE — ASSESSMENT & PLAN NOTE
· Patient with COPD exacerbation, patient required BiPAP initially-only on nasal cannula  · Patient was given dexamethasone x1 in the emergency room      · Currently getting Solu-Medrol 40 mg BID, continue taper-hopeful transition to oral prednisone within 24 to 48 hours  · Respiratory protocol  · Airway clearance protocol  · Continue with Mucinex  · Pulmonology evaluation  · Continue with supplemental oxygen

## 2023-03-20 NOTE — UTILIZATION REVIEW
Initial Clinical Review    Admission: Date/Time/Statement:   Admission Orders (From admission, onward)     Ordered        03/18/23 2219  Inpatient Admission  Once                      Orders Placed This Encounter   Procedures   • Inpatient Admission     Standing Status:   Standing     Number of Occurrences:   1     Order Specific Question:   Level of Care     Answer:   Level 2 Stepdown / HOT [14]     Order Specific Question:   Estimated length of stay     Answer:   More than 2 Midnights     Order Specific Question:   Certification     Answer:   I certify that inpatient services are medically necessary for this patient for a duration of greater than two midnights  See H&P and MD Progress Notes for additional information about the patient's course of treatment  ED Arrival Information     Expected   -    Arrival   3/18/2023 19:12    Acuity   Urgent            Means of arrival   Wheelchair    Escorted by   Family Member    Service   Hospitalist    Admission type   Emergency            Arrival complaint   Shortness of Breath           Chief Complaint   Patient presents with   • Shortness of Breath     This afternoon started with SOB  Hx COPD  Pt states she has not had a BM in over a week, and she feels like if she had a BM she could breathe better       Initial Presentation: 80 y o  female with PMH GERD, anemia, COPD, HTN, PE on Xarelto, 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, d/c from hospital 3/9/23 with PNA,  who presents to the ER from home with SOB   Per son, pt SOB and constipated since d/c 03/09/2023  SOB unrelieved despite nebulizers and rescue inhaler at home per son  On exam, was tachypneic and had  increased work of breathing, and was placed on BiPAP in ED  CXR shows large hiatal hernia, no large infiltrates or effusions  After hour-long nebulizer and 10 mg IV Decadron,pt weaned from BiPAP to 3 L nasal cannula with SPO2 99%  Pt wheezing, decreased bowel sounds, pale skinVBG 7 39/42/34/26  WBC 13 21   Pt admitted as Inpatient to level 2 SD with COPD, large hiatal hernia, SIRS  Plan - Supplemental O2-wean as able with goal sat >/= 88 %  , PRN BiPAP for increased WOB, start Solumedrol 40 mg IV q8h, Scheduled Xopenex/Atrovent neb,continue home nebs    General sx consult, serial abdominal exams   PPI   Date: 3/19  Day 2: Downgraded to med surg   General sx consult- denies any coughing up food, or worsening GERD symptoms  Pt declines any surgical intervention at this time  Continue with conservative mngt, cont with small meals and GERD treatment  She is also 89yo with multiple medical co-morbidities so she remains a high risk surgical candidate  GI consult- Constipation worsening, normally has 2 bm/week   Pt feels abdominal bloating  Abdomen non distended on exam, soft, non tender  Plan - start Miralax BID, follow abdominal exams and bm's   Hemoglobin 8 3  Iron panel more consistent with anemia of chronic disease  ST - bedside swallow eval- s/s suggestive of mild  oral and suspected minimal pharyngeal dysphagia  Limitted dentition   Recommended Diet: soft/level 3 diet and thin liquids   Medicine- Pt feels her breathing is better  Has rhonchi, decreased breath sounds  On O2 @3 L with tachypnea  Leukocytosis improving   Infectious workup neg, monitor off abx   Start taper of IV Solumedrol tomorrow, continue supplemental O2, continue Mucinex         ED Triage Vitals [03/18/23 1925]   Temperature Pulse Respirations Blood Pressure SpO2   98 2 °F (36 8 °C) 88 (!) 24 165/80 97 %      Temp Source Heart Rate Source Patient Position - Orthostatic VS BP Location FiO2 (%)   Temporal Monitor Sitting Right arm --      Pain Score       No Pain          Wt Readings from Last 1 Encounters:   03/20/23 46 7 kg (102 lb 15 3 oz)     Additional Vital Signs:   Date/Time Temp Pulse Resp BP MAP (mmHg) SpO2 Calculated FIO2 (%) - Nasal Cannula Nasal Cannula O2 Flow Rate (L/min) O2 Device   03/19/23 1955 97 5 °F (36 4 °C) 80 28 Abnormal  133/63 90 98 % 32 3 L/min Nasal cannula   03/19/23 1930 -- -- -- -- -- -- 32 3 L/min Nasal cannula   03/19/23 1508 97 6 °F (36 4 °C) 79 24 Abnormal  140/65 94 97 % -- -- Nasal cannula   03/19/23 1300 -- 82 42 Abnormal  -- -- 100 % -- -- --   03/19/23 1232 97 8 °F (36 6 °C) 76 23 Abnormal  129/63 90 99 % 32 3 L/min Nasal cannula   03/19/23 1118 97 5 °F (36 4 °C) 74 34 Abnormal  -- -- 94 % -- -- --   03/19/23 1014 97 1 °F (36 2 °C) Abnormal  92 34 Abnormal  131/60 87 96 % 32 3 L/min Nasal cannula   03/19/23 1000 -- 77 39 Abnormal  -- -- 100 % -- -- --   03/19/23 0806 -- -- -- -- -- 100 % -- -- --   03/19/23 0515 98 2 °F (36 8 °C) 71 18 139/64 92 99 % 32 3 L/min Nasal cannula   03/19/23 0314 -- -- -- -- -- -- 32 3 L/min Nasal cannula   03/18/23 2253 98 5 °F (36 9 °C) 82 25 Abnormal  137/65 94 98 % 32 3 L/min Nasal cannula   03/18/23 2100 -- 85 28 Abnormal  128/96 107 100 % -- -- --   03/18/23 2053 -- -- -- -- -- 96 % -- -- --     Pertinent Labs/Diagnostic Test Results:    3/18 ECG- ECG rate:  86     ECG rate assessment: normal     Rhythm:     Rhythm: sinus rhythm     Ectopy:     Ectopy: none     QRS:     QRS axis:  Normal   Conduction:     Conduction: normal     ST segments:     ST segments:  Normal   T waves:     T waves: normal     Comments:      Self interpreted by me   NO ST ELEV, Depression or TWI  XR abdomen 1 view kub   Final Result by Jena Marmolejo MD (03/19 1006)      Stool in the right hemiabdomen predominant  Large hiatal hernia  Workstation performed: [id number]         XR chest 1 view portable   Final Result by Shaye Powers MD (03/19 9792)      Minimal left base atelectasis  Large hiatal hernia                 Workstation performed: [id number]           Results from last 7 days   Lab Units 03/18/23 2206   SARS-COV-2  Negative     Results from last 7 days   Lab Units 03/19/23  0509 03/18/23 1955 03/18/23  1946   WBC Thousand/uL 8 65  --  13 21*   HEMOGLOBIN g/dL 8 3*  --  9 7* I STAT HEMOGLOBIN g/dl  --  10 5*  --    HEMATOCRIT % 26 1*  --  30 7*   HEMATOCRIT, ISTAT %  --  31*  --    PLATELETS Thousands/uL 290  --  390   NEUTROS ABS Thousands/µL 8 23*  --  11 19*         Results from last 7 days   Lab Units 03/19/23  0509 03/18/23 1955 03/18/23 1946   SODIUM mmol/L 136  --  137   POTASSIUM mmol/L 4 8  --  4 9   CHLORIDE mmol/L 106  --  103   CO2 mmol/L 24  --  26   CO2, I-STAT mmol/L  --  27  --    ANION GAP mmol/L 6  --  8   BUN mg/dL 25  --  25   CREATININE mg/dL 0 71  --  0 91   EGFR ml/min/1 73sq m 72  --  53   CALCIUM mg/dL 8 3*  --  8 9   CALCIUM, IONIZED, ISTAT mmol/L  --  1 22  --    MAGNESIUM mg/dL 2 3  --  2 4     Results from last 7 days   Lab Units 03/18/23 1946   AST U/L 13   ALT U/L 13   ALK PHOS U/L 84   TOTAL PROTEIN g/dL 6 3*   ALBUMIN g/dL 3 6   TOTAL BILIRUBIN mg/dL 0 45         Results from last 7 days   Lab Units 03/19/23  0509 03/18/23 1946   GLUCOSE RANDOM mg/dL 177* 91                  Results from last 7 days   Lab Units 03/18/23 1955   PH, JERRELL I-STAT  7 394   PCO2, JERRELL ISTAT mm HG 42 7   PO2, JERRELL ISTAT mm HG 34 0*   HCO3, JERRELL ISTAT mmol/L 26 1   I STAT BASE EXC mmol/L 1   I STAT O2 SAT % 65         Results from last 7 days   Lab Units 03/19/23  0515 03/18/23 2206 03/18/23 1946   HS TNI 0HR ng/L  --   --  9   HS TNI 2HR ng/L  --  9  --    HSTNI D2 ng/L  --  0  --    HS TNI 4HR ng/L 6  --   --    HSTNI D4 ng/L -3  --   --          Results from last 7 days   Lab Units 03/18/23 1946   PROTIME seconds 20 5*   INR  1 65*         Results from last 7 days   Lab Units 03/19/23  0509 03/18/23 1946   PROCALCITONIN ng/ml 0 07 0 08     Results from last 7 days   Lab Units 03/18/23  2203   LACTIC ACID mmol/L 1 0             Results from last 7 days   Lab Units 03/18/23  1946   BNP pg/mL 126*                             Results from last 7 days   Lab Units 03/18/23  2335   CLARITY UA  Clear   COLOR UA  Yellow   SPEC GRAV UA  1 025   PH UA  6 0   GLUCOSE UA mg/dl Negative   KETONES UA mg/dl 10 (1+)*   BLOOD UA  Negative   PROTEIN UA mg/dl Trace*   NITRITE UA  Negative   BILIRUBIN UA  Negative   UROBILINOGEN UA (BE) mg/dl <2 0   LEUKOCYTES UA  Negative   WBC UA /hpf 2-4   RBC UA /hpf 0-1*   BACTERIA UA /hpf None Seen   EPITHELIAL CELLS WET PREP /hpf Occasional     Results from last 7 days   Lab Units 03/18/23  2335 03/18/23  2206   STREP PNEUMONIAE ANTIGEN, URINE  Negative  --    LEGIONELLA URINARY ANTIGEN  Negative  --    INFLUENZA A PCR   --  Negative   INFLUENZA B PCR   --  Negative   RSV PCR   --  Negative                             Results from last 7 days   Lab Units 03/18/23  2100   BLOOD CULTURE  No Growth at 24 hrs  No Growth at 24 hrs                 ED Treatment:   Medication Administration from 03/18/2023 1911 to 03/18/2023 2245       Date/Time Order Dose Route Action     03/18/2023 2002 EDT albuterol inhalation solution 10 mg 10 mg Nebulization Given     03/18/2023 2002 EDT ipratropium (ATROVENT) 0 02 % inhalation solution 1 mg 1 mg Nebulization Given     03/18/2023 2002 EDT sodium chloride 0 9 % inhalation solution 3 mL 3 mL Nebulization Given     03/18/2023 1959 EDT dexamethasone (DECADRON) injection 10 mg 10 mg Intravenous Given        Past Medical History:   Diagnosis Date   • COPD (chronic obstructive pulmonary disease) (HCC)    • Hiatal hernia    • Hypertension    • Melanoma in situ of the skin ([place] [address] )     Multiple sites removed   • Pneumonia    • Pulmonary embolism (HCC)     on Xarelto   • Skin cancer      Present on Admission:  • COPD (chronic obstructive pulmonary disease) ([place] [address] )  • Gastroesophageal reflux disease  • Hypertension  • CML (chronic myelocytic leukemia) (HCC)  • Pulmonary emboli (HCC)  • Large hiatal hernia  • Essential thrombocythemia ([place] [address] )  • Anemia      Admitting Diagnosis: Shortness of breath [R06 02]  COPD (chronic obstructive pulmonary disease) ([place] [address] ) [J44 9]  Respiratory distress [R06 03]  Age/Sex: 80 y o  female  Admission Orders:  Scheduled Medications:  amLODIPine, 10 mg, Oral, Daily  budesonide, 0 5 mg, Nebulization, BID  fluticasone-vilanterol, 1 puff, Inhalation, Daily  guaiFENesin, 600 mg, Oral, Q12H CONNIE  hydroxyurea, 500 mg, Oral, Daily  ipratropium, 0 5 mg, Nebulization, TID  levalbuterol, 1 25 mg, Nebulization, TID  methylPREDNISolone sodium succinate, 40 mg, Intravenous, Q8H CONNIE  montelukast, 10 mg, Oral, HS  pantoprazole, 40 mg, Oral, Daily Before Breakfast  polyethylene glycol, 17 g, Oral, BID  rivaroxaban, 20 mg, Oral, Daily With Dinner      Continuous IV Infusions:     PRN Meds:  glycerin-hypromellose-, 1 drop, Both Eyes, Q4H PRN  levalbuterol, 1 25 mg, Nebulization, Q8H PRN    spirometry   SCD   O2 nc to keep sat >92 %   dental soft diet w/ thin liquid      IP CONSULT TO CASE MANAGEMENT  IP CONSULT TO ACUTE CARE SURGERY  IP CONSULT TO GASTROENTEROLOGY    Network Utilization Review Department  ATTENTION: Please call with any questions or concerns to [phone] and carefully listen to the prompts so that you are directed to the right person  All voicemails are confidential   [facility] all requests for admission clinical reviews, approved or denied determinations and any other requests to dedicated fax number below belonging to the campus where the patient is receiving treatment   List of dedicated fax numbers for the Facilities:  [address] [place] [address] DENIALS (Administrative/Medical Necessity) [phone]   [address] (Maternity/NICU/Pediatrics) [phone]   [address] [phone]   [facility] [address] [phone]   [address] [place] [address] [facility] [place] [place] [address] [facility] [phone]     [address] [phone]   [address] [phone]

## 2023-03-20 NOTE — CASE MANAGEMENT
Case Management Assessment & Discharge Planning Note    Patient name Heidi Plummer  Location /-70 MRN [id number]  : 3/22/1926 Date 3/20/2023       Current Admission Date: 3/18/2023  Current Admission Diagnosis:COPD (chronic obstructive pulmonary disease) [facility])   Patient Active Problem List    Diagnosis Date Noted   • SIRS (systemic inflammatory response syndrome) ([facility] ) 2023   • Constipation 2023   • Pneumonia 2023   • Anemia 2023   • Pulmonary emboli ([address] ) 2023   • Essential thrombocythemia ([facility] ) 2023   • CML (chronic myelocytic leukemia) ([facility] ) 2023   • Syncope 2023   • BRBPR (bright red blood per rectum) 2023   • COPD (chronic obstructive pulmonary disease) ([facility] ) 2023   • Large hiatal hernia 2020   • Chronic GERD 04/15/2019   • Dysphagia 2019   • Gastroesophageal reflux disease 2019   • Family history of colonic polyps 2019   • Chronic anticoagulation 2019   • Rib fractures 2017   • Hypertension 2017      LOS (days): 2  Geometric Mean LOS (GMLOS) (days):   Days to GMLOS:     OBJECTIVE:  PATIENT READMITTED TO HOSPITAL  Risk of Unplanned Readmission Score: 26 68         Current admission status: Inpatient       Preferred Pharmacy:   [facility] [address], [address]  [address] [place] [id number]  Phone: [phone] Fax: [phone]    Primary Care Provider: SLIM Barr    Primary Insurance: [address]  Secondary Insurance:     ASSESSMENT:  [address], [address] - Son   Primary Phone: [phone] (Home)               Advance Directives  Does patient have a [address]?: Yes  Does patient have Advance Directives?: Yes  Advance Directives: Living will, Power of  for health care  Primary Contact: Koko Blount         Readmission Root Cause  30 Day Readmission: Yes  Who directed you to return to the hospital?: Family  Did you understand whom to contact if you had questions or problems?: Yes  Did you get your prescriptions before you left the hospital?: No (Delivery is not an option)  Reason[de-identified] Delivery service not offered  Were you able to get your prescriptions filled when you left the hospital?: Yes  Did you take your medications as prescribed?: Yes  Were you able to get to your follow-up appointments?: No  Reason[de-identified] Other (comment) (Appointments cancelled by provider)  During previous admission, was a post-acute recommendation made?: No  Patient was readmitted due to: COPD exacerbation    Patient Information  Admitted from[de-identified] Home  Mental Status: Alert  During Assessment patient was accompanied by: Not accompanied during assessment  Assessment information provided by[de-identified] Patient  Primary Caregiver: Self  Support Systems: Self, [place] [employer] [address] of Residence: [place]ome Best[place]  What city do you live in?: [facility] [address] entry access options   Select all that apply : Stairs  Number of steps to enter home : 3  Type of Current Residence: Ranch  In the last 12 months, was there a time when you were not able to pay the mortgage or rent on time?: No  In the last 12 months, how many places have you lived?: 1  In the last 12 months, was there a time when you did not have a steady place to sleep or slept in a shelter (including now)?: No  Homeless/housing insecurity resource given?: N/A  Living Arrangements: Lives w/ Son    Activities of Daily Living Prior to Admission  Functional Status: Independent  Completes ADLs independently?: Yes  Ambulates independently?: Yes  Does patient use assisted devices?: Yes  Assisted Devices (DME) used: Straight Corinne Cousins  Does patient currently own DME?: Yes  What DME does the patient currently own?: Straight Corinne Cousins  Does patient have a history of Outpatient Therapy (PT/OT)?: No  Does the patient have a history of Short-Term Rehab?: Yes [employer])  Does patient have a history of HHC?: Yes ([facility])  Does patient currently have [address]?: No         Patient Information Continued  Income Source: Pension/long-term  Does patient have prescription coverage?: Yes  Within the past 12 months, you worried that your food would run out before you got the money to buy more : Never true  Within the past 12 months, the food you bought just didn't last and you didn't have money to get more : Never true  Food insecurity resource given?: N/A  Does patient receive dialysis treatments?: No  Does patient have a history of substance abuse?: No  Does patient have a history of Mental Health Diagnosis?: No         Means of Transportation  Means of Transport to Appts[de-identified] Family transport  In the past 12 months, has lack of transportation kept you from medical appointments or from getting medications?: No  In the past 12 months, has lack of transportation kept you from meetings, work, or from getting things needed for daily living?: No  Was application for public transport provided?: N/A        DISCHARGE DETAILS:    Discharge planning discussed with[de-identified] Patient  Freedom of Choice: Yes  Comments - Freedom of Choice: Patient prefers [facility]  CM contacted family/caregiver?: No- see comments (Pt reports being in contact with son)  Were Treatment Team discharge recommendations reviewed with patient/caregiver?: Yes  Did patient/caregiver verbalize understanding of patient care needs?: Yes  Were patient/caregiver advised of the risks associated with not following Treatment Team discharge recommendations?: Yes         [address]         Is the patient interested in [address] at discharge?: No    DME Referral Provided  Referral made for DME?: No              Treatment Team Recommendation: Home  Discharge Destination Plan[de-identified] Home                                         Additional Comments: Met with patient to discuss role of CM and any needs prior to discharge  Indp PTA   Pt resides w/ son in a ranch style home w/ 3 [address]  Pt owns cane and walker  No hx OP PT  Hx str at [employer] and [facility] with [facility]  Patient's son will transport at discharge

## 2023-03-20 NOTE — PROGRESS NOTES
Progress note - [address] Gastroenterology   Lyudmila Thibodeaux 80 y o  female MRN: [id number]  Unit/Bed#: -01 Encounter: [id number]    ASSESSMENT and PLAN    1  Constipation  Longstanding bowel movement pattern has been on the constipated side  Seems of gotten worse since recent discharge after being admitted for probable diverticular bleed  No bowel movement yet  -Continue MiraLAX twice a day  -Follow exam and bowel movements     2   Large hiatal hernia  Chronic issue  Seen by surgery  Not a surgical candidate     3  Recent hospitalization with diverticular bleed  No further bleeding  Hemoglobin stable around 8  Iron panel more consistent with anemia of chronic disease  -Follow for now     4  COPD exacerbation  Per pulmonary    Stable from GI standpoint  We will sign off and see again as needed    Chief Complaint   Patient presents with   • Shortness of Breath     This afternoon started with SOB  Hx COPD  Pt states she has not had a BM in over a week, and she feels like if she had a BM she could breathe better       SUBJECTIVE/HPI   No bowel movement  No GI bleeding    No GI complaints    /59   Pulse 74   Temp 97 5 °F (36 4 °C)   Resp 19   Ht 4' 10" (1 473 m)   Wt 46 7 kg (102 lb 15 3 oz)   SpO2 98%   BMI 21 52 kg/m²     PHYSICALEXAM  General appearance: alert, appears stated age and cooperative  Eyes: PERLLA, EOMI, no icterus   Head: Normocephalic, without obvious abnormality, atraumatic  Lungs: Bilateral wheezing  Heart: regular rate and rhythm, S1, S2 normal, no murmur, click, rub or gallop  Abdomen: soft, non-tender; bowel sounds normal; no masses,  no organomegaly  Extremities: extremities normal, atraumatic, no cyanosis or edema  Neurologic: Grossly normal    Lab Results   Component Value Date    GLUCOSE 99 03/18/2023    CALCIUM 8 6 03/20/2023     03/16/2015    K 4 6 03/20/2023    CO2 27 03/20/2023     03/20/2023    BUN 28 (H) 03/20/2023    CREATININE 0 68 03/20/2023     Lab Results   Component Value Date    WBC 12 58 (H) 03/20/2023    HGB 8 0 (L) 03/20/2023    HCT 25 3 (L) 03/20/2023     (H) 03/20/2023     03/20/2023     Lab Results   Component Value Date    ALT 13 03/18/2023    AST 13 03/18/2023    ALKPHOS 84 03/18/2023    BILITOT 0 3 03/16/2015     Lab Results   Component Value Date    LIPASE 11 03/06/2023     Lab Results   Component Value Date    IRON 12 (L) 03/08/2023    TIBC 211 (L) 03/08/2023    FERRITIN 93 03/08/2023     Lab Results   Component Value Date    INR 1 65 (H) 03/18/2023

## 2023-03-20 NOTE — PROGRESS NOTES
[place]  Progress Note - Heidi Plummer 3/22/1926, 80 y o  female MRN: [id number]  Unit/Bed#: -01 Encounter: [id number]  Primary Care Provider: SLIM Barr   Date and time admitted to hospital: 3/18/2023  7:36 PM    CML (chronic myelocytic leukemia) ([facility] )  Assessment & Plan  · Patient with known history of CML and maintained on hydroxyurea  · Follows up with Dr Hugo Bridges in [facility]    Constipation  Assessment & Plan  · Patient noted to have constipation on imaging studies  Continue with the bowel regimen  SIRS (systemic inflammatory response syndrome) (HCC)  Assessment & Plan  · Present at the time of admission with leukocytosis and tachypnea  · Leukocytosis is improving  · With known history of CML on hydroxyurea  · Procal negative  · Infectious work-up has been negative  · Monitor off of antibiotics  · Remained afebrile and hemodynamically    Essential thrombocythemia (HCC)  Assessment & Plan  · Monitor platelet    Pulmonary emboli [facility])  Assessment & Plan  · History of PE noted in 2018  Patient is on Xarelto as outpatient  · Continue with Xarelto    Hypertension  Assessment & Plan  · Continue with Norvasc    Anemia  Assessment & Plan  · Patient with chronic anemia  Currently hemoglobin at baseline  · Monitor  · No signs of active bleed  · Continue iron supplement    Large hiatal hernia  Assessment & Plan  · Patient with known history of large hiatal hernia  · GI and surgery has been consulted-ensure she is a candidate for any kind of surgical repair  · Remains nonoperative    Gastroesophageal reflux disease  Assessment & Plan  · Continue PPI    * COPD (chronic obstructive pulmonary disease) ([facility] )  Assessment & Plan  · Patient with COPD exacerbation, patient required BiPAP initially-only on nasal cannula  · Patient was given dexamethasone x1 in the emergency room      · Currently getting Solu-Medrol 40 mg BID, continue taper-hopeful transition to oral prednisone within 24 to 48 hours  · Respiratory protocol  · Airway clearance protocol  · Continue with Mucinex  · Pulmonology evaluation  · Continue with supplemental oxygen        VTE Pharmacologic Prophylaxis: VTE Score: 6 High Risk (Score >/= 5) - Pharmacological DVT Prophylaxis Ordered: rivaroxaban (Xarelto)  Sequential Compression Devices Ordered  Patient Centered Rounds: I performed bedside rounds with nursing staff today  Discussions with Specialists or Other Care Team Provider: Discussed with GI, no surgical intervention planned  Education and Discussions with Family / Patient: Patient declined call to   Total Time Spent on Date of Encounter in care of patient: 25 minutes This time was spent on one or more of the following: performing physical exam; counseling and coordination of care; obtaining or reviewing history; documenting in the medical record; reviewing/ordering tests, medications or procedures; communicating with other healthcare professionals and discussing with patient's family/caregivers  Current Length of Stay: 2 day(s)  Current Patient Status: Inpatient   Certification Statement: The patient will continue to require additional inpatient hospital stay due to IV steroids  Discharge Plan: Anticipate discharge in 24-48 hrs to discharge location to be determined pending rehab evaluations  Code Status: Level 3 - DNAR and DNI    Subjective:   Patient feels her breathing has improved, offers no complaints  She is hopeful for discharge soon as she is feeling much better  Objective:     Vitals:   Temp (24hrs), Av 5 °F (36 4 °C), Min:97 3 °F (36 3 °C), Max:97 8 °F (36 6 °C)    Temp:  [97 3 °F (36 3 °C)-97 8 °F (36 6 °C)] 97 5 °F (36 4 °C)  HR:  [68-82] 74  Resp:  [16-42] 19  BP: (129-140)/(59-65) 135/59  SpO2:  [94 %-100 %] 98 %  Body mass index is 21 52 kg/m²  Input and Output Summary (last 24 hours):      Intake/Output Summary (Last 24 hours) at 3/20/2023 1043  Last data filed at 3/20/2023 0045  Gross per 24 hour   Intake 160 ml   Output 1100 ml   Net -940 ml       Physical Exam:   Physical Exam  Vitals and nursing note reviewed  Constitutional:       General: She is not in acute distress  Appearance: Normal appearance  She is well-developed  HENT:      Head: Normocephalic and atraumatic  Eyes:      General: No scleral icterus  Conjunctiva/sclera: Conjunctivae normal    Cardiovascular:      Rate and Rhythm: Normal rate and regular rhythm  Heart sounds: No murmur heard  Pulmonary:      Effort: Pulmonary effort is normal       Breath sounds: Decreased air movement present  Decreased breath sounds present  No wheezing, rhonchi or rales  Abdominal:      General: There is no distension  Palpations: Abdomen is soft  Skin:     General: Skin is warm and dry  Neurological:      General: No focal deficit present  Mental Status: She is alert  Psychiatric:         Mood and Affect: Mood normal         Additional Data:     Labs:  Results from last 7 days   Lab Units 03/20/23  0420 03/19/23  0509   WBC Thousand/uL 12 58* 8 65   HEMOGLOBIN g/dL 8 0* 8 3*   HEMATOCRIT % 25 3* 26 1*   PLATELETS Thousands/uL 314 290   NEUTROS PCT %  --  95*   LYMPHS PCT %  --  3*   MONOS PCT %  --  1*   EOS PCT %  --  0     Results from last 7 days   Lab Units 03/20/23 0420 03/18/23 1955 03/18/23 1946   SODIUM mmol/L 138   < > 137   POTASSIUM mmol/L 4 6   < > 4 9   CHLORIDE mmol/L 105   < > 103   CO2 mmol/L 27   < > 26   CO2, I-STAT   --    < >  --    BUN mg/dL 28*   < > 25   CREATININE mg/dL 0 68   < > 0 91   ANION GAP mmol/L 6   < > 8   CALCIUM mg/dL 8 6   < > 8 9   ALBUMIN g/dL  --   --  3 6   TOTAL BILIRUBIN mg/dL  --   --  0 45   ALK PHOS U/L  --   --  84   ALT U/L  --   --  13   AST U/L  --   --  13   GLUCOSE RANDOM mg/dL 151*   < > 91    < > = values in this interval not displayed       Results from last 7 days   Lab Units 03/18/23 1946   INR  1 65* Results from last 7 days   Lab Units 03/19/23  0509 03/18/23  2203 03/18/23  1946   LACTIC ACID mmol/L  --  1 0  --    PROCALCITONIN ng/ml 0 07  --  0 08       Lines/Drains:  Invasive Devices     Peripheral Intravenous Line  Duration           Peripheral IV 03/18/23 Right Antecubital 1 day                      Imaging: Reviewed radiology reports from this admission including: chest xray and xray(s)    Recent Cultures (last 7 days):   Results from last 7 days   Lab Units 03/18/23  2335 03/18/23  2100   BLOOD CULTURE   --  No Growth at 24 hrs  No Growth at 24 hrs  LEGIONELLA URINARY ANTIGEN  Negative  --        Last 24 Hours Medication List:   Current Facility-Administered Medications   Medication Dose Route Frequency Provider Last Rate   • amLODIPine  10 mg Oral Daily Magy Granado PA-C     • budesonide  0 5 mg Nebulization BID Deric Kearney PA-C     • fluticasone-vilanterol  1 puff Inhalation Daily Deric Kearney PA-C     • glycerin-hypromellose-  1 drop Both Eyes Q4H PRN Deric Kearney PA-C     • guaiFENesin  600 mg Oral Q12H [facility] Magy Granado PA-C     • hydroxyurea  500 mg Oral Daily Magy Granado PA-C     • ipratropium  0 5 mg Nebulization TID Deric Kearney PA-C     • levalbuterol  1 25 mg Nebulization TID Deric Kearney PA-C     • levalbuterol  1 25 mg Nebulization Q8H PRN Deric Kearney PA-C     • methylPREDNISolone sodium succinate  40 mg Intravenous Q12H [facility] Bev JUNG PA-C     • montelukast  10 mg Oral HS Mayg Granado PA-C     • pantoprazole  40 mg Oral Daily Before Breakfast Magy Granado PA-C     • polyethylene glycol  17 g Oral BID Deric Kearney PA-C     • rivaroxaban  20 mg Oral Daily With Dinner Deric Kearney PA-C          Today, Patient Was Seen By: Denver Aldrich, PA-C    **Please Note: This note may have been constructed using a voice recognition system  **

## 2023-03-20 NOTE — ASSESSMENT & PLAN NOTE
· Patient with known history of CML and maintained on hydroxyurea  · Follows up with Dr Barbie Vu in [facility]

## 2023-03-20 NOTE — PLAN OF CARE
Problem: MOBILITY - ADULT  Goal: Maintain or return to baseline ADL function  Description: INTERVENTIONS:  -  Assess patient's ability to carry out ADLs; assess patient's baseline for ADL function and identify physical deficits which impact ability to perform ADLs (bathing, care of mouth/teeth, toileting, grooming, dressing, etc )  - Assess/evaluate cause of self-care deficits   - Assess range of motion  - Assess patient's mobility; develop plan if impaired  - Assess patient's need for assistive devices and provide as appropriate  - Encourage maximum independence but intervene and supervise when necessary  - Involve family in performance of ADLs  - Assess for home care needs following discharge   - Consider OT consult to assist with ADL evaluation and planning for discharge  - Provide patient education as appropriate  Outcome: Progressing  Goal: Maintains/Returns to pre admission functional level  Description: INTERVENTIONS:  - Perform BMAT or MOVE assessment daily    - Set and communicate daily mobility goal to care team and patient/family/caregiver  - Collaborate with rehabilitation services on mobility goals if consulted  - Perform Range of Motion 3 times a day  - Reposition patient every 3 hours    - Dangle patient 3 times a day  - Stand patient 3 times a day  - Ambulate patient 3 times a day  - Out of bed to chair 3 times a day   - Out of bed for meals 3 times a day  - Out of bed for toileting  - Record patient progress and toleration of activity level   Outcome: Progressing     Problem: INFECTION - ADULT  Goal: Absence or prevention of progression during hospitalization  Description: INTERVENTIONS:  - Assess and monitor for signs and symptoms of infection  - Monitor lab/diagnostic results  - Monitor all insertion sites, i e  indwelling lines, tubes, and drains  - Monitor endotracheal if appropriate and nasal secretions for changes in amount and color  - [place] appropriate cooling/warming therapies per order  - Administer medications as ordered  - Instruct and encourage patient and family to use good hand hygiene technique  - Identify and instruct in appropriate isolation precautions for identified infection/condition  Outcome: Progressing     Problem: SAFETY ADULT  Goal: Maintain or return to baseline ADL function  Description: INTERVENTIONS:  -  Assess patient's ability to carry out ADLs; assess patient's baseline for ADL function and identify physical deficits which impact ability to perform ADLs (bathing, care of mouth/teeth, toileting, grooming, dressing, etc )  - Assess/evaluate cause of self-care deficits   - Assess range of motion  - Assess patient's mobility; develop plan if impaired  - Assess patient's need for assistive devices and provide as appropriate  - Encourage maximum independence but intervene and supervise when necessary  - Involve family in performance of ADLs  - Assess for home care needs following discharge   - Consider OT consult to assist with ADL evaluation and planning for discharge  - Provide patient education as appropriate  Outcome: Progressing  Goal: Maintains/Returns to pre admission functional level  Description: INTERVENTIONS:  - Perform BMAT or MOVE assessment daily    - Set and communicate daily mobility goal to care team and patient/family/caregiver  - Collaborate with rehabilitation services on mobility goals if consulted  - Perform Range of Motion 3 times a day  - Reposition patient every 3 hours    - Dangle patient 3 times a day  - Stand patient 3 times a day  - Ambulate patient 3 times a day  - Out of bed to chair 3 times a day   - Out of bed for meals 3 times a day  - Out of bed for toileting  - Record patient progress and toleration of activity level   Outcome: Progressing  Goal: Patient will remain free of falls  Description: INTERVENTIONS:  - Educate patient/family on patient safety including physical limitations  - Instruct patient to call for assistance with activity   - Consult OT/PT to assist with strengthening/mobility   - Keep Call bell within reach  - Keep bed low and locked with side rails adjusted as appropriate  - Keep care items and personal belongings within reach  - Initiate and maintain comfort rounds  - Make Fall Risk Sign visible to staff  - Offer Toileting every 2 Hours, in advance of need  - Initiate/Maintain alarm  - Obtain necessary fall risk management equipment  - Apply yellow socks and bracelet for high fall risk patients  - Consider moving patient to room near nurses station  Outcome: Progressing     Problem: DISCHARGE PLANNING  Goal: Discharge to home or other facility with appropriate resources  Description: INTERVENTIONS:  - Identify barriers to discharge w/patient and caregiver  - Arrange for needed discharge resources and transportation as appropriate  - Identify discharge learning needs (meds, wound care, etc )  - Arrange for interpretive services to assist at discharge as needed  - Refer to Case Management Department for coordinating discharge planning if the patient needs post-hospital services based on physician/advanced practitioner order or complex needs related to functional status, cognitive ability, or social support system  Outcome: Progressing     Problem: Knowledge Deficit  Goal: Patient/family/caregiver demonstrates understanding of disease process, treatment plan, medications, and discharge instructions  Description: Complete learning assessment and assess knowledge base  Interventions:  - Provide teaching at level of understanding  - Provide teaching via preferred learning methods  Outcome: Progressing     Problem: Nutrition/Hydration-ADULT  Goal: Nutrient/Hydration intake appropriate for improving, restoring or maintaining nutritional needs  Description: Monitor and assess patient's nutrition/hydration status for malnutrition  Collaborate with interdisciplinary team and initiate plan and interventions as ordered    Monitor patient's weight and dietary intake as ordered or per policy  Utilize nutrition screening tool and intervene as necessary  Determine patient's food preferences and provide high-protein, high-caloric foods as appropriate       INTERVENTIONS:  - Monitor oral intake, urinary output, labs, and treatment plans  - Assess nutrition and hydration status and recommend course of action  - Evaluate amount of meals eaten  - Assist patient with eating if necessary   - Allow adequate time for meals  - Recommend/ encourage appropriate diets, oral nutritional supplements, and vitamin/mineral supplements  - Order, calculate, and assess calorie counts as needed  - Recommend, monitor, and adjust tube feedings and TPN/PPN based on assessed needs  - Assess need for intravenous fluids  - Provide specific nutrition/hydration education as appropriate  - Include patient/family/caregiver in decisions related to nutrition  Outcome: Progressing

## 2023-03-20 NOTE — ASSESSMENT & PLAN NOTE
· Patient with chronic anemia    Currently hemoglobin at baseline  · Monitor  · No signs of active bleed  · Continue iron supplement

## 2023-03-20 NOTE — NURSING NOTE
Pt transferred from ICU to room 220; awake until approx 0330, then sleeping lightly during hrly rounds  Oxygen at 2 liters with sats at rest 99/100%  RT made aware

## 2023-03-20 NOTE — SPEECH THERAPY NOTE
Speech Language/Pathology  Speech/Language Pathology Progress Note    Patient Name: Alford Kocher KEJOJMarileeV Date: 3/20/2023     Problem List  Principal Problem:    COPD (chronic obstructive pulmonary disease) (HCC)  Active Problems:    Gastroesophageal reflux disease    Large hiatal hernia    Anemia    Hypertension    Pulmonary emboli (HCC)    Essential thrombocythemia (HCC)    CML (chronic myelocytic leukemia) (HCC)    SIRS (systemic inflammatory response syndrome) (HCC)    Constipation       Past Medical History  Past Medical History:   Diagnosis Date   • COPD (chronic obstructive pulmonary disease) ([facility] )    • Hiatal hernia    • Hypertension    • Melanoma in situ of the skin ([facility] )     Multiple sites removed   • Pneumonia    • Pulmonary embolism (HCC)     on Xarelto   • Skin cancer         Past Surgical History  Past Surgical History:   Procedure Laterality Date   • APPENDECTOMY     • HYSTERECTOMY     • FL ESOPHAGOGASTRODUODENOSCOPY TRANSORAL DIAGNOSTIC N/A 5/1/2019    Procedure: ESOPHAGOGASTRODUODENOSCOPY (EGD); Surgeon: Anita Anderson MD;  Location: [facility] OR;  Service: Gastroenterology   • SKIN CANCER EXCISION     • SKIN CANCER EXCISION     • UPPER GASTROINTESTINAL ENDOSCOPY           Subjective:  Pt in bed with eyes closed  Pt easily alerted with verbal greeting from SLP  Pt responded with "hello"   No complaints given  Objective:  Pt and RN report no difficulties with chewing/swallowing dysphagia level 3 solids and thin liquids  Pt reports that, "I'm eating good" and that she prefers the softer foods  Pt politely declined solid trials at this time stating that she was "still full" from breakfast  Pt consumed single and consecutive sips of thin liquids without overt s/s of aspiration/dysphagia  Assessment:  Pt consumed single and consecutive sips of thin liquids without overt s/s of aspiration/dysphagia  No further ST services warranted at this time       Plan/Recommendations:  DC ST  Dysphagia Level 3/Soft Foods  Thin Liquids  Medications as Tolerated  Oral Care x2-3 times per day  Upright with all PO intake

## 2023-03-21 VITALS
SYSTOLIC BLOOD PRESSURE: 127 MMHG | WEIGHT: 102.95 LBS | BODY MASS INDEX: 21.61 KG/M2 | RESPIRATION RATE: 20 BRPM | HEART RATE: 72 BPM | HEIGHT: 58 IN | DIASTOLIC BLOOD PRESSURE: 54 MMHG | OXYGEN SATURATION: 96 % | TEMPERATURE: 97.3 F

## 2023-03-21 LAB
ANION GAP SERPL CALCULATED.3IONS-SCNC: 6 MMOL/L (ref 4–13)
BASOPHILS # BLD AUTO: 0.01 THOUSANDS/ÂΜL (ref 0–0.1)
BASOPHILS NFR BLD AUTO: 0 % (ref 0–1)
BUN SERPL-MCNC: 29 MG/DL (ref 5–25)
CALCIUM SERPL-MCNC: 8.5 MG/DL (ref 8.4–10.2)
CHLORIDE SERPL-SCNC: 105 MMOL/L (ref 96–108)
CO2 SERPL-SCNC: 27 MMOL/L (ref 21–32)
CREAT SERPL-MCNC: 0.69 MG/DL (ref 0.6–1.3)
EOSINOPHIL # BLD AUTO: 0 THOUSAND/ÂΜL (ref 0–0.61)
EOSINOPHIL NFR BLD AUTO: 0 % (ref 0–6)
ERYTHROCYTE [DISTWIDTH] IN BLOOD BY AUTOMATED COUNT: 16.7 % (ref 11.6–15.1)
GFR SERPL CREATININE-BSD FRML MDRD: 73 ML/MIN/1.73SQ M
GLUCOSE SERPL-MCNC: 144 MG/DL (ref 65–140)
HCT VFR BLD AUTO: 27.8 % (ref 34.8–46.1)
HGB BLD-MCNC: 8.8 G/DL (ref 11.5–15.4)
IMM GRANULOCYTES # BLD AUTO: 0.24 THOUSAND/UL (ref 0–0.2)
IMM GRANULOCYTES NFR BLD AUTO: 1 % (ref 0–2)
LYMPHOCYTES # BLD AUTO: 0.32 THOUSANDS/ÂΜL (ref 0.6–4.47)
LYMPHOCYTES NFR BLD AUTO: 2 % (ref 14–44)
MCH RBC QN AUTO: 36.4 PG (ref 26.8–34.3)
MCHC RBC AUTO-ENTMCNC: 31.7 G/DL (ref 31.4–37.4)
MCV RBC AUTO: 115 FL (ref 82–98)
MONOCYTES # BLD AUTO: 0.31 THOUSAND/ÂΜL (ref 0.17–1.22)
MONOCYTES NFR BLD AUTO: 2 % (ref 4–12)
NEUTROPHILS # BLD AUTO: 16.35 THOUSANDS/ÂΜL (ref 1.85–7.62)
NEUTS SEG NFR BLD AUTO: 95 % (ref 43–75)
NRBC BLD AUTO-RTO: 0 /100 WBCS
PLATELET # BLD AUTO: 349 THOUSANDS/UL (ref 149–390)
PMV BLD AUTO: 9 FL (ref 8.9–12.7)
POTASSIUM SERPL-SCNC: 4.9 MMOL/L (ref 3.5–5.3)
RBC # BLD AUTO: 2.42 MILLION/UL (ref 3.81–5.12)
SODIUM SERPL-SCNC: 138 MMOL/L (ref 135–147)
WBC # BLD AUTO: 17.23 THOUSAND/UL (ref 4.31–10.16)

## 2023-03-21 RX ORDER — POLYETHYLENE GLYCOL 3350 17 G/17G
17 POWDER, FOR SOLUTION ORAL DAILY PRN
Qty: 30 EACH | Refills: 0 | Status: SHIPPED | OUTPATIENT
Start: 2023-03-21

## 2023-03-21 RX ORDER — PREDNISONE 20 MG/1
TABLET ORAL
Qty: 12 TABLET | Refills: 0 | Status: SHIPPED | OUTPATIENT
Start: 2023-03-21 | End: 2023-03-30

## 2023-03-21 RX ADMIN — LEVALBUTEROL 1.25 MG: 1.25 SOLUTION, CONCENTRATE RESPIRATORY (INHALATION) at 08:26

## 2023-03-21 RX ADMIN — IPRATROPIUM BROMIDE 0.5 MG: 0.5 SOLUTION RESPIRATORY (INHALATION) at 12:51

## 2023-03-21 RX ADMIN — AMLODIPINE BESYLATE 10 MG: 5 TABLET ORAL at 09:01

## 2023-03-21 RX ADMIN — GUAIFENESIN 600 MG: 600 TABLET ORAL at 09:01

## 2023-03-21 RX ADMIN — POLYETHYLENE GLYCOL 3350 17 G: 17 POWDER, FOR SOLUTION ORAL at 09:01

## 2023-03-21 RX ADMIN — RIVAROXABAN 20 MG: 20 TABLET, FILM COATED ORAL at 15:55

## 2023-03-21 RX ADMIN — BISACODYL 10 MG: 10 SUPPOSITORY RECTAL at 14:01

## 2023-03-21 RX ADMIN — PANTOPRAZOLE SODIUM 40 MG: 40 TABLET, DELAYED RELEASE ORAL at 05:16

## 2023-03-21 RX ADMIN — IPRATROPIUM BROMIDE 0.5 MG: 0.5 SOLUTION RESPIRATORY (INHALATION) at 08:26

## 2023-03-21 RX ADMIN — BUDESONIDE 0.5 MG: 0.5 INHALANT ORAL at 08:26

## 2023-03-21 RX ADMIN — LEVALBUTEROL 1.25 MG: 1.25 SOLUTION, CONCENTRATE RESPIRATORY (INHALATION) at 12:51

## 2023-03-21 RX ADMIN — HYDROXYUREA 500 MG: 500 CAPSULE ORAL at 09:01

## 2023-03-21 RX ADMIN — METHYLPREDNISOLONE SODIUM SUCCINATE 40 MG: 40 INJECTION, POWDER, FOR SOLUTION INTRAMUSCULAR; INTRAVENOUS at 09:02

## 2023-03-21 RX ADMIN — FLUTICASONE FUROATE AND VILANTEROL TRIFENATATE 1 PUFF: 200; 25 POWDER RESPIRATORY (INHALATION) at 09:00

## 2023-03-21 NOTE — ASSESSMENT & PLAN NOTE
· Patient with known history of CML and maintained on hydroxyurea  · Follows up with Dr Clare Ramos in [facility]  · Patient has outpatient appointment scheduled for 3/22

## 2023-03-21 NOTE — DISCHARGE SUMMARY
[facility]  Discharge- Fany Borrego 3/22/1926, 80 y o  female MRN: [id number]  Unit/Bed#: -01 Encounter: [id number]  Primary Care Provider: SLIM Augustin   Date and time admitted to hospital: 3/18/2023  7:36 PM    * COPD (chronic obstructive pulmonary disease) ([facility] )  Assessment & Plan  · Patient with COPD exacerbation, patient required BiPAP initially-only on nasal cannula  · Patient was given dexamethasone x1 in the emergency room  · Transitioned to Solumedrol daily today, can be discharged and begin steroid taper tomorrow outpatient  · Respiratory protocol  · Airway clearance protocol  · Continue with Mucinex  · Continue with supplemental oxygen    CML (chronic myelocytic leukemia) (HCC)  Assessment & Plan  · Patient with known history of CML and maintained on hydroxyurea  · Follows up with Dr Amarjit Tsang in [place]  · Patient has outpatient appointment scheduled for 3/22    Constipation  Assessment & Plan  · Patient noted to have constipation on imaging studies  Continue with the bowel regimen  SIRS (systemic inflammatory response syndrome) ([facility])  Assessment & Plan  · Present at the time of admission with leukocytosis and tachypnea  · Leukocytosis is improving  · With known history of CML on hydroxyurea  · Procal negative  · Infectious work-up has been negative  · Monitor off of antibiotics  · Remained afebrile and hemodynamically    Essential thrombocythemia (HCC)  Assessment & Plan  · Monitor platelet    Pulmonary emboli [facility])  Assessment & Plan  · History of PE noted in 2018  Patient is on Xarelto as outpatient  · Continue with Xarelto    Hypertension  Assessment & Plan  · Continue with Norvasc    Anemia  Assessment & Plan  · Patient with chronic anemia    Currently hemoglobin at baseline  · Monitor  · No signs of active bleed  · Continue iron supplement    Large hiatal hernia  Assessment & Plan  · Patient with known history of large hiatal hernia  · GI and surgery has been consulted-ensure she is a candidate for any kind of surgical repair  · Remains nonoperative    Gastroesophageal reflux disease  Assessment & Plan  · Continue PPI    Medical Problems     Resolved Problems  Date Reviewed: 3/21/2023   None       Discharging Physician / Practitioner: King Montero PA-C  PCP: Dimitris Rivero  Admission Date:   Admission Orders (From admission, onward)     Ordered        23 2219  Inpatient Admission  Once                      Discharge Date: 23    Consultations During Hospital Stay:  · Gastroenterology  · General Surgery    Procedures Performed:   · none    Significant Findings / Test Results:   · CXR 3/18: Minimal left base atelectasis  Large hiatal hernia  · Right abdomen 3/18: Stool in the right hemiabdomen predominant  Large hiatal hernia  Incidental Findings:   · None    Test Results Pending at Discharge (will require follow up): · None     Outpatient Tests Requested:  · None    Complications: None    Reason for Admission: COPD exacerbation    Hospital Course:   Randi Smith is a 80 y o  female patient past medical history of CML, COPD, hypertension, prior PE, on Xarelto, joao hernia, GERD who originally presented to the hospital on 3/18/2023 due to shortness of breath  Had been recently discharged 3/9 after being treated for pneumonia  She did initially require BiPAP and admission under critical care service on stepdown unit  Responded well to IV steroids and nebulizer treatments  Ultimately was weaned to room air by 3/20, IV steroids weaned  Patient is able to continue nebulizer treatments at home and transition to oral prednisone  She has outpatient follow-up with her hematologist tomorrow and plans to follow-up with her pulmonologist and PCP  Hemodynamically stable at time of discharge and appropriate for outpatient follow-up  Please see above list of diagnoses and related plan for additional information       Condition at Discharge: stable    Discharge Day Visit / Exam:   Subjective: Reports she is a bit crabby today, cannot specify as to why, currently receiving nebulizer treatment and feels her breathing is much improved, wants to go home  Vitals: Blood Pressure: 138/61 (03/21/23 0727)  Pulse: 70 (03/21/23 0727)  Temperature: (!) 97 2 °F (36 2 °C) (03/21/23 0727)  Temp Source: Tympanic (03/20/23 2119)  Respirations: 20 (03/20/23 2119)  Height: 4' 10" (147 3 cm) (03/18/23 2200)  Weight - Scale: 46 7 kg (102 lb 15 3 oz) (03/20/23 0215)  SpO2: 99 % (03/21/23 5724)  Exam:   Physical Exam  Vitals and nursing note reviewed  Constitutional:       General: She is not in acute distress  Appearance: Normal appearance  She is well-developed  HENT:      Head: Normocephalic and atraumatic  Eyes:      General: No scleral icterus  Conjunctiva/sclera: Conjunctivae normal    Cardiovascular:      Rate and Rhythm: Normal rate and regular rhythm  Heart sounds: No murmur heard  Pulmonary:      Effort: Pulmonary effort is normal       Breath sounds: Wheezing (trace, currently receiving neb) present  No rhonchi or rales  Abdominal:      General: There is no distension  Palpations: Abdomen is soft  Skin:     General: Skin is warm and dry  Neurological:      General: No focal deficit present  Mental Status: She is alert  Psychiatric:         Mood and Affect: Mood normal        Discussion with Family: Patient declined call to   Discharge instructions/Information to patient and family:   See after visit summary for information provided to patient and family  Provisions for Follow-Up Care:  See after visit summary for information related to follow-up care and any pertinent home health orders  Disposition:   Home    Planned Readmission: None     Discharge Statement:  I spent 65 minutes discharging the patient  This time was spent on the day of discharge   I had direct contact with the patient on the day of discharge  Greater than 50% of the total time was spent examining patient, answering all patient questions, arranging and discussing plan of care with patient as well as directly providing post-discharge instructions  Additional time then spent on discharge activities  Discharge Medications:  See after visit summary for reconciled discharge medications provided to patient and/or family        **Please Note: This note may have been constructed using a voice recognition system**

## 2023-03-21 NOTE — RESPIRATORY THERAPY NOTE
RT Protocol Note  Lyudmila Thibodeaux 80 y o  female MRN: [id number]  Unit/Bed#: -01 Encounter: [id number]    Assessment    Principal Problem:    COPD (chronic obstructive pulmonary disease) ([facility] )  Active Problems:    Gastroesophageal reflux disease    Large hiatal hernia    Anemia    Hypertension    Pulmonary emboli (HCC)    Essential thrombocythemia (HCC)    CML (chronic myelocytic leukemia) (HCC)    SIRS (systemic inflammatory response syndrome) (HCC)    Constipation      Home Pulmonary Medications:         Past Medical History:   Diagnosis Date    COPD (chronic obstructive pulmonary disease) ([facility] )     Hiatal hernia     Hypertension     Melanoma in situ of the skin ([place] [address] )     Multiple sites removed    Pneumonia     Pulmonary embolism ([place] [address] )     on Xarelto    Skin cancer      Social History     Socioeconomic History    Marital status:      Spouse name: None    Number of children: None    Years of education: None    Highest education level: None   Occupational History    None   Tobacco Use    Smoking status: Former     Types: Cigarettes     Quit date:      Years since quittin 2    Smokeless tobacco: Never   Vaping Use    Vaping Use: Never used   Substance and Sexual Activity    Alcohol use: Never    Drug use: Never    Sexual activity: None   Other Topics Concern    None   Social History Narrative    None     Social Determinants of Health     Financial Resource Strain: Not on file   Food Insecurity: No Food Insecurity    Worried About Running Out of Food in the Last Year: Never true    Ran Out of Food in the Last Year: Never true   Transportation Needs: No Transportation Needs    Lack of Transportation (Medical): No    Lack of Transportation (Non-Medical):  No   Physical Activity: Not on file   Stress: Not on file   Social Connections: Not on file   Intimate Partner Violence: Not on file   Housing Stability: Low Risk     Unable to Pay for Housing in the Last Year: No    Number of Places Lived in the Last Year: 1    Unstable Housing in the Last Year: No       Subjective         Objective    Physical Exam:        Vitals:  Blood pressure 127/54, pulse 72, temperature (!) 97 3 °F (36 3 °C), resp  rate 20, height 4' 10" (1 473 m), weight 46 7 kg (102 lb 15 3 oz), SpO2 96 %  Imaging and other studies: I have personally reviewed pertinent reports        O2 Device: 3L NC     Plan    Respiratory Plan: (P) Mild Distress pathway  Airway Clearance Plan: Incentive Spirometer     Resp Comments: Pt placed on room air

## 2023-03-21 NOTE — ASSESSMENT & PLAN NOTE
· Patient with COPD exacerbation, patient required BiPAP initially-only on nasal cannula  · Patient was given dexamethasone x1 in the emergency room      · Transitioned to Solumedrol daily today, can be discharged and begin steroid taper tomorrow outpatient  · Respiratory protocol  · Airway clearance protocol  · Continue with Mucinex  · Continue with supplemental oxygen

## 2023-03-21 NOTE — NURSING NOTE
Patient d/c to home with referral for pulmonary rehab  Discharge instructions reviewed with patient and son - all questions/concerns addressed prior to d/c

## 2023-03-21 NOTE — PLAN OF CARE
Problem: MOBILITY - ADULT  Goal: Maintain or return to baseline ADL function  Description: INTERVENTIONS:  -  Assess patient's ability to carry out ADLs; assess patient's baseline for ADL function and identify physical deficits which impact ability to perform ADLs (bathing, care of mouth/teeth, toileting, grooming, dressing, etc )  - Assess/evaluate cause of self-care deficits   - Assess range of motion  - Assess patient's mobility; develop plan if impaired  - Assess patient's need for assistive devices and provide as appropriate  - Encourage maximum independence but intervene and supervise when necessary  - Involve family in performance of ADLs  - Assess for home care needs following discharge   - Consider OT consult to assist with ADL evaluation and planning for discharge  - Provide patient education as appropriate  Outcome: Progressing     Problem: INFECTION - ADULT  Goal: Absence or prevention of progression during hospitalization  Description: INTERVENTIONS:  - Assess and monitor for signs and symptoms of infection  - Monitor lab/diagnostic results  - Monitor all insertion sites, i e  indwelling lines, tubes, and drains  - Monitor endotracheal if appropriate and nasal secretions for changes in amount and color  - [place] appropriate cooling/warming therapies per order  - Administer medications as ordered  - Instruct and encourage patient and family to use good hand hygiene technique  - Identify and instruct in appropriate isolation precautions for identified infection/condition  Outcome: Progressing     Problem: SAFETY ADULT  Goal: Maintain or return to baseline ADL function  Description: INTERVENTIONS:  -  Assess patient's ability to carry out ADLs; assess patient's baseline for ADL function and identify physical deficits which impact ability to perform ADLs (bathing, care of mouth/teeth, toileting, grooming, dressing, etc )  - Assess/evaluate cause of self-care deficits   - Assess range of motion  - Assess patient's mobility; develop plan if impaired  - Assess patient's need for assistive devices and provide as appropriate  - Encourage maximum independence but intervene and supervise when necessary  - Involve family in performance of ADLs  - Assess for home care needs following discharge   - Consider OT consult to assist with ADL evaluation and planning for discharge  - Provide patient education as appropriate  Outcome: Progressing

## 2023-03-21 NOTE — PLAN OF CARE
Problem: INFECTION - ADULT  Goal: Absence or prevention of progression during hospitalization  Description: INTERVENTIONS:  - Assess and monitor for signs and symptoms of infection  - Monitor lab/diagnostic results  - Monitor all insertion sites, i e  indwelling lines, tubes, and drains  - Monitor endotracheal if appropriate and nasal secretions for changes in amount and color  - [place] appropriate cooling/warming therapies per order  - Administer medications as ordered  - Instruct and encourage patient and family to use good hand hygiene technique  - Identify and instruct in appropriate isolation precautions for identified infection/condition  Outcome: Progressing     Problem: SAFETY ADULT  Goal: Maintain or return to baseline ADL function  Description: INTERVENTIONS:  -  Assess patient's ability to carry out ADLs; assess patient's baseline for ADL function and identify physical deficits which impact ability to perform ADLs (bathing, care of mouth/teeth, toileting, grooming, dressing, etc )  - Assess/evaluate cause of self-care deficits   - Assess range of motion  - Assess patient's mobility; develop plan if impaired  - Assess patient's need for assistive devices and provide as appropriate  - Encourage maximum independence but intervene and supervise when necessary  - Involve family in performance of ADLs  - Assess for home care needs following discharge   - Consider OT consult to assist with ADL evaluation and planning for discharge  - Provide patient education as appropriate  Outcome: Progressing  Goal: Maintains/Returns to pre admission functional level  Description: INTERVENTIONS:  - Perform BMAT or MOVE assessment daily    - Set and communicate daily mobility goal to care team and patient/family/caregiver  - Collaborate with rehabilitation services on mobility goals if consulted  - Perform Range of Motion 3 times a day  - Reposition patient every 2 hours    - Dangle patient 3 times a day  - Stand patient 3 times a day  - Ambulate patient 3 times a day  - Out of bed to chair 3 times a day for meals  - Out of bed for toileting  - Record patient progress and toleration of activity level   Outcome: Progressing  Goal: Patient will remain free of falls  Description: INTERVENTIONS:  - Educate patient/family on patient safety including physical limitations  - Instruct patient to call for assistance with activity   - Consult OT/PT to assist with strengthening/mobility   - Keep Call bell within reach  - Keep bed low and locked with side rails adjusted as appropriate  - Keep care items and personal belongings within reach  - Initiate and maintain comfort rounds  - Make Fall Risk Sign visible to staff  - Offer Toileting every 2 Hours, in advance of need  - Initiate/Maintain bed/chair alarm  - Obtain necessary fall risk management equipment: alarms, walker  - Apply yellow socks and bracelet for high fall risk patients  - Consider moving patient to room near nurses station  Outcome: Progressing     Problem: DISCHARGE PLANNING  Goal: Discharge to home or other facility with appropriate resources  Description: INTERVENTIONS:  - Identify barriers to discharge w/patient and caregiver  - Arrange for needed discharge resources and transportation as appropriate  - Identify discharge learning needs (meds, wound care, etc )  - Arrange for interpretive services to assist at discharge as needed  - Refer to Case Management Department for coordinating discharge planning if the patient needs post-hospital services based on physician/advanced practitioner order or complex needs related to functional status, cognitive ability, or social support system  Outcome: Progressing     Problem: Knowledge Deficit  Goal: Patient/family/caregiver demonstrates understanding of disease process, treatment plan, medications, and discharge instructions  Description: Complete learning assessment and assess knowledge base    Interventions:  - Provide teaching at level of understanding  - Provide teaching via preferred learning methods  Outcome: Progressing     Problem: Nutrition/Hydration-ADULT  Goal: Nutrient/Hydration intake appropriate for improving, restoring or maintaining nutritional needs  Description: Monitor and assess patient's nutrition/hydration status for malnutrition  Collaborate with interdisciplinary team and initiate plan and interventions as ordered  Monitor patient's weight and dietary intake as ordered or per policy  Utilize nutrition screening tool and intervene as necessary  Determine patient's food preferences and provide high-protein, high-caloric foods as appropriate       INTERVENTIONS:  - Monitor oral intake, urinary output, labs, and treatment plans  - Assess nutrition and hydration status and recommend course of action  - Evaluate amount of meals eaten  - Assist patient with eating if necessary   - Allow adequate time for meals  - Recommend/ encourage appropriate diets, oral nutritional supplements, and vitamin/mineral supplements  - Order, calculate, and assess calorie counts as needed  - Assess need for intravenous fluids  - Provide specific nutrition/hydration education as appropriate  - Include patient/family/caregiver in decisions related to nutrition  Outcome: Progressing     Problem: RESPIRATORY - ADULT  Goal: Achieves optimal ventilation and oxygenation  Description: INTERVENTIONS:  - Assess for changes in respiratory status  - Assess for changes in mentation and behavior  - Position to facilitate oxygenation and minimize respiratory effort  - Oxygen administered by appropriate delivery if ordered  - Initiate smoking cessation education as indicated  - Encourage broncho-pulmonary hygiene including cough, deep breathe, Incentive Spirometry  - Assess the need for suctioning and aspirate as needed  - Assess and instruct to report SOB or any respiratory difficulty  - Respiratory Therapy support as indicated  Outcome: Progressing     Problem: GASTROINTESTINAL - ADULT  Goal: Maintains or returns to baseline bowel function  Description: INTERVENTIONS:  - Assess bowel function  - Encourage oral fluids to ensure adequate hydration  - Administer IV fluids if ordered to ensure adequate hydration  - Administer ordered medications as needed  - Encourage mobilization and activity  - Consider nutritional services referral to assist patient with adequate nutrition and appropriate food choices  Outcome: Progressing

## 2023-03-22 NOTE — UTILIZATION REVIEW
NOTIFICATION OF ADMISSION DISCHARGE   This is a Notification of Discharge from [address]  Please be advised that this patient has been discharge from our facility  Below you will find the admission and discharge date and time including the patient’s disposition  UTILIZATION REVIEW CONTACT:  Felicity Shannon  Utilization   Network Utilization Review Department  Phone: [phone] carefully listen to the prompts  All voicemails are confidential   Email: [email or web address]     ADMISSION INFORMATION  PRESENTATION DATE: 3/18/2023  7:36 PM  OBERVATION ADMISSION DATE:   INPATIENT ADMISSION DATE: 3/18/23 10:19 PM   DISCHARGE DATE: 3/21/2023  6:32 PM   DISPOSITION:Home/Self Care    IMPORTANT INFORMATION:  Send all requests for admission clinical reviews, approved or denied determinations and any other requests to dedicated fax number below belonging to the campus where the patient is receiving treatment   List of dedicated fax numbers:  [address] DENIALS (Administrative/Medical Necessity) [phone]   [address] (Maternity/NICU/Pediatrics) [phone]   [facility] [phone]   [place]  [phone]   [address] [place]  [phone]   [address] [facility] [phone]   [address] [place] [address] [phone]   [address] [place] [address] [phone]   [facility]  [phone]   [facility] [phone]   [address] [facility] [address] [facility] [phone]

## 2023-03-24 LAB
BACTERIA BLD CULT: NORMAL
BACTERIA BLD CULT: NORMAL

## 2024-05-06 NOTE — ASSESSMENT & PLAN NOTE
Wondering is antiviral is indicated at this time if EBV is still positive?     I'm not sure if you received the other message regarding omega fish oil, Vitamin B12 or any other otc supplement to help with brain fog symptoms at the moment? B12 level was 328 on 3/4/24. Please advise, thank you!   · Monitor platelet

## 2024-07-26 NOTE — DISCHARGE SUMMARY
[place]  Discharge- Dauna Eye 3/22/1926, 80 y o  female MRN: [id number]  Unit/Bed#: -01 Encounter: [id number]  Primary Care Provider: SLIM Zacarias   Date and time admitted to hospital: 3/6/2023 10:21 AM    * Pneumonia  Assessment & Plan  · Presented for SOB + cough x several days, no relief with home nebs  Meeting sepsis  Placed on 2L on admission  · CXR: Left lingula and lateral left perihilar small patchy densities  · Urinary antigens negative    · Sputum GS/C not obtained   · Received 3 days of IV Ceftriaxone and completed 3 days of IV Azithromycin   · Will discharge patient with PO Vantin BID to complete 7 day course  · Stable on room air   · Speech: dysphagia 3 + thins  · PT/OT: [facility]  · Outpatient PCP follow up and repeat CXR in 4-6 weeks to assess for resolution of PNA     Sepsis (HCC)-resolved as of 3/9/2023  Assessment & Plan  · SIRS: tachypnea + tachycardia  Source is PNA  · Lactic WNL  · Procal 0 69 --> 13 --> 6  · CXR: Left lingula and lateral left perihilar small patchy densities     · UA without infection   · Initial BC 1/2 growing gram + cocci (staph epidermidis)  · Repeat BC with no growth at 24h  · Suspect contaminant  · Resolved     Anemia  Assessment & Plan  Recent GIB/BRBPR during prior admission, colonoscopy had been deferred 2/2 age  · Baseline Hgb is 7 8-8 6  · Hgb on admission 8 7  · No evidence of active bleeding   · Iron panel reflect deficiency, continue PO supplement (venofer contraindicated)  · Monitor CBC, Hgb stable at 8     Hypoxia-resolved as of 3/9/2023  Assessment & Plan  · No O2 at baseline   · Requiring 2L on admission  · CXR with L PNA    · D dimer 0 9 (less than cut off for age adjustment + patient compliant with xarelto, do not suspect PE)  · Treat with IV abx & IV steroids as above - will discharge with oral abx and steroids  · Suspect large hiatal hernia also contributing   · Ordered home O2 evaluation however patient declined stating that she does not want to go home on oxygen  RN ambulated patient and O2 saturation remained at 91% on RA at rest and with ambulation  CML (chronic myelocytic leukemia) ([facility] )  Assessment & Plan  · Follows outpatient at [address] [place] [address] with Dr Carson Poag   · Declined BM bx previously due to age   · Continue hydroxyurea      Hypertension  Assessment & Plan  · Continue amlodipine     COPD (chronic obstructive pulmonary disease) ([facility] )  Assessment & Plan  · No wheezing however IV solumedrol added 2/2 tachypnea, suspect mild exacerbation   · Continue home nebs   · Resp protocol   · IV solumedrol BID --> weaned to oral prednisone taper today   · Improved     Large hiatal hernia  Assessment & Plan  · Previously noted large hiatal hernia per CXR, echo noted mild compression of LA   · Cards previously consulted during last admission --> no interventions recommended as patient is poor surgical candidate   · Continue PPI    Chronic anticoagulation  Assessment & Plan  · Hx of PE   · Continue xarelto 20 mg daily         Medical Problems     Resolved Problems  Date Reviewed: 3/9/2023          Resolved    Sepsis ([facility] ) 3/9/2023     Resolved by  Jenna Tello PA-C    Hypoxia 3/9/2023     Resolved by  Jenna Tello PA-C        Discharging Physician / Practitioner: Jenna Tello PA-C  [facility]  Admission Date:   Admission Orders (From admission, onward)     Ordered        03/06/23 1157  INPATIENT ADMISSION  Once                      Discharge Date: 03/09/23    Consultations During Hospital Stay:  · PT/OT     Procedures Performed:   · None     Significant Findings / Test Results:   XR chest portable   Final Result by Brandt Hampton MD (03/06 1120)      Left infiltrates  The study was marked in [facility] for immediate notification                    Workstation performed: [id number]             Incidental Findings:   · None      Test Results Pending at Discharge (will require follow up): · Finalized blood cultures      Outpatient Tests Requested:  · Follow up with PCP in 1-2 weeks     Complications:  None     Reason for Admission: PNA, COPD exacerbation     Hospital Course:   Callie Castro is a 80 y o  female patient who originally presented to the hospital on 3/6/2023 due to SOB and cough  CXR revealing left sided pneumonia for which she completed 3 days of IV Ceftriaxone and Azithromycin  She will be discharged with oral Vantin BID to complete 7 day course  She was requiring 2L supplemental oxygen on admission and is now stable on room air at rest and with ambulation  Suspected concomitant mild COPD exacerbation for which she received IV solumedrol and is now transitioned to oral prednisone taper  She is to follow up with her PCP in 1-2 weeks  Additionally, patient with 1/2 blood cultures growing staph epidermidis, suspected contaminant  Repeat blood cultures with no growth at 24h  Will need to follow up on finalized blood culture results with PCP and return to the ER if patient experiences fevers and worsening of symptoms  Please see above list of diagnoses and related plan for additional information  Condition at Discharge: good    Discharge Day Visit / Exam:   Subjective:  Patient resting in bed comfortably with no acute complaints  Reports feeling much better in regards to her breathing  Denies any SOB at rest or with ambulation  States that she would like to go home today  Vitals: Blood Pressure: 135/67 (03/09/23 0719)  Pulse: 82 (03/09/23 0719)  Temperature: (!) 97 2 °F (36 2 °C) (03/09/23 0719)  Temp Source: Oral (03/07/23 1700)  Respirations: 21 (03/09/23 0719)  Height: 4' 10" (147 3 cm) (03/06/23 1016)  Weight - Scale: 49 8 kg (109 lb 11 2 oz) (03/09/23 0452)  SpO2: 92 % (03/09/23 0719)  Exam:   Physical Exam  Constitutional:       General: She is not in acute distress    HENT:      Mouth/Throat:      Mouth: Mucous membranes are moist    Eyes:      General: No scleral icterus  Cardiovascular:      Rate and Rhythm: Normal rate and regular rhythm  Heart sounds: Normal heart sounds  Pulmonary:      Breath sounds: Normal breath sounds  Abdominal:      General: Abdomen is flat  Bowel sounds are normal       Palpations: Abdomen is soft  Tenderness: There is no abdominal tenderness  Musculoskeletal:      Right lower leg: No edema  Left lower leg: No edema  Skin:     General: Skin is warm  Neurological:      Mental Status: She is alert and oriented to person, place, and time  Psychiatric:         Mood and Affect: Mood normal           Discussion with Family: Updated  (niece) via phone  Discharge instructions/Information to patient and family:   See after visit summary for information provided to patient and family  Provisions for Follow-Up Care:  See after visit summary for information related to follow-up care and any pertinent home health orders  Disposition:   Home with [facility]NA Services (Reminder: Complete face to face encounter)    Planned Readmission: none      Discharge Statement:  I spent 45 minutes discharging the patient  This time was spent on the day of discharge  I had direct contact with the patient on the day of discharge  Greater than 50% of the total time was spent examining patient, answering all patient questions, arranging and discussing plan of care with patient as well as directly providing post-discharge instructions  Additional time then spent on discharge activities  Discharge Medications:  See after visit summary for reconciled discharge medications provided to patient and/or family        **Please Note: This note may have been constructed using a voice recognition system** Yes - the patient is able to be screened

## (undated) DEVICE — SYRINGE 50ML LL

## (undated) DEVICE — MEDI-VAC YANKAUER SUCTION HANDLE W/STRAIGHT TIP & CONTROL VENT: Brand: CARDINAL HEALTH

## (undated) DEVICE — 1200CC GUARDIAN II: Brand: GUARDIAN

## (undated) DEVICE — TUBING SUCTION 5MM X 12 FT

## (undated) DEVICE — SYRINGE 30ML LL